# Patient Record
Sex: MALE | Race: WHITE | Employment: OTHER | URBAN - METROPOLITAN AREA
[De-identification: names, ages, dates, MRNs, and addresses within clinical notes are randomized per-mention and may not be internally consistent; named-entity substitution may affect disease eponyms.]

---

## 2017-01-23 DIAGNOSIS — E78.5 HYPERLIPIDEMIA LDL GOAL <100: Primary | ICD-10-CM

## 2017-01-23 DIAGNOSIS — E11.9 TYPE 2 DIABETES MELLITUS TREATED WITHOUT INSULIN (H): ICD-10-CM

## 2017-01-23 NOTE — TELEPHONE ENCOUNTER
Metformin         Last Written Prescription Date: 10/31/16  Last Fill Quantity: 180, # refills: 0  Last Office Visit with [facility], [facility] or [facility] prescribing provider:  08/10/16        BP Readings from Last 3 Encounters:   08/10/16 120/70   05/24/16 105/64   03/03/16 112/56     MICROL       28   8/10/2016  No results found for this basename: microalbumin  CREATININE   Date Value Ref Range Status   08/10/2016 1.40* 0.66 - 1.25 mg/dL Final   ]  GFR ESTIMATE   Date Value Ref Range Status   08/10/2016 52* >60 mL/min/1.7m2 Final     Comment:     Non  GFR Calc   03/03/2016 69 >60 mL/min/1.7m2 Final     Comment:     Non  GFR Calc   03/02/2016 61 >60 mL/min/1.7m2 Final     Comment:     Non  GFR Calc     GFR ESTIMATE IF BLACK   Date Value Ref Range Status   08/10/2016 63 >60 mL/min/1.7m2 Final     Comment:      GFR Calc   03/03/2016 84 >60 mL/min/1.7m2 Final     Comment:      GFR Calc   03/02/2016 74 >60 mL/min/1.7m2 Final     Comment:      GFR Calc     CHOL      128   8/10/2016  HDL       22   8/10/2016  LDL       66   8/10/2016  TRIG      198   8/10/2016  CHOLHDLRATIO      5.5   10/8/2015  AST       23   8/10/2016  ALT       39   8/10/2016  A1C      6.1   8/10/2016  A1C      5.9   10/8/2015  A1C      6.0   3/24/2015  A1C      5.7   9/19/2014  A1C      7.0   3/4/2014  POTASSIUM   Date Value Ref Range Status   08/10/2016 4.4 3.4 - 5.3 mmol/L Final       Labs showing if normal/abnormal  Lab Results   Component Value Date    UCRR 212 08/10/2016    MICROL 28 08/10/2016     Lab Results   Component Value Date    CHOL 128 08/10/2016    TRIG 198* 08/10/2016    HDL 22* 08/10/2016    LDL 66 08/10/2016    VLDL 30 10/08/2015    CHOLHDLRATIO 5.5* 10/08/2015     Lab Results   Component Value Date    A1C 6.1* 08/10/2016    A1C 5.9 10/08/2015    A1C 6.0 03/24/2015     Atorvastatin     Last Written Prescription Date: 10/31/16  Last Fill Quantity:  90, # refills: 0  Last Office Visit with [facility], [facility] or [facility] prescribing provider: 08/10/16       CHOL      128   8/10/2016  HDL       22   8/10/2016  LDL       66   8/10/2016  TRIG      198   8/10/2016  CHOLHDLRATIO      5.5   10/8/2015    Labs showing if normal/abnormal  Lab Results   Component Value Date    CHOL 128 08/10/2016    TRIG 198* 08/10/2016    HDL 22* 08/10/2016    LDL 66 08/10/2016    VLDL 30 10/08/2015    CHOLHDLRATIO 5.5* 10/08/2015

## 2017-01-25 DIAGNOSIS — D73.5 SPLENIC INFARCTION: Primary | ICD-10-CM

## 2017-01-25 RX ORDER — ATORVASTATIN CALCIUM 20 MG/1
20 TABLET, FILM COATED ORAL DAILY
Qty: 90 TABLET | Refills: 0 | Status: SHIPPED | OUTPATIENT
Start: 2017-01-25 | End: 2017-04-22

## 2017-01-25 NOTE — TELEPHONE ENCOUNTER
Atorvastatin prescription approved per [facility] Refill Protocol--due for OV in Feb 2017.      Routing Metformin refill request to provider for review/approval because:  Labs out of range:  Creat    Please advise, thanks.

## 2017-01-25 NOTE — TELEPHONE ENCOUNTER
rebecca         Last Written Prescription Date: 12/21/16  Last Fill Quantity: 20, # refills: 0  Last Office Visit with [facility], [facility] or [facility] prescribing provider:  8/10/16        BP Readings from Last 3 Encounters:   08/10/16 120/70   05/24/16 105/64   03/03/16 112/56     MICROL       28   8/10/2016  No results found for this basename: microalbumin  CREATININE   Date Value Ref Range Status   08/10/2016 1.40* 0.66 - 1.25 mg/dL Final   ]  GFR ESTIMATE   Date Value Ref Range Status   08/10/2016 52* >60 mL/min/1.7m2 Final     Comment:     Non  GFR Calc   03/03/2016 69 >60 mL/min/1.7m2 Final     Comment:     Non  GFR Calc   03/02/2016 61 >60 mL/min/1.7m2 Final     Comment:     Non  GFR Calc     GFR ESTIMATE IF BLACK   Date Value Ref Range Status   08/10/2016 63 >60 mL/min/1.7m2 Final     Comment:      GFR Calc   03/03/2016 84 >60 mL/min/1.7m2 Final     Comment:      GFR Calc   03/02/2016 74 >60 mL/min/1.7m2 Final     Comment:      GFR Calc     CHOL      128   8/10/2016  HDL       22   8/10/2016  LDL       66   8/10/2016  TRIG      198   8/10/2016  CHOLHDLRATIO      5.5   10/8/2015  AST       23   8/10/2016  ALT       39   8/10/2016  A1C      6.1   8/10/2016  A1C      5.9   10/8/2015  A1C      6.0   3/24/2015  A1C      5.7   9/19/2014  A1C      7.0   3/4/2014  POTASSIUM   Date Value Ref Range Status   08/10/2016 4.4 3.4 - 5.3 mmol/L Final     Karin Ness - Pharmacy [facility]  [phone]

## 2017-01-27 RX ORDER — WARFARIN SODIUM 7.5 MG/1
TABLET ORAL
Qty: 20 TABLET | Refills: 0 | Status: SHIPPED | OUTPATIENT
Start: 2017-01-27 | End: 2017-03-01

## 2017-01-27 NOTE — TELEPHONE ENCOUNTER
Medication is being filled for 1 time refill only due to:  Patient needs to be seen because INR due.

## 2017-01-31 DIAGNOSIS — E11.22 TYPE 2 DIABETES MELLITUS WITH STAGE 3 CHRONIC KIDNEY DISEASE, WITHOUT LONG-TERM CURRENT USE OF INSULIN (H): ICD-10-CM

## 2017-01-31 DIAGNOSIS — N52.8 OTHER MALE ERECTILE DYSFUNCTION: Primary | ICD-10-CM

## 2017-01-31 DIAGNOSIS — N18.30 TYPE 2 DIABETES MELLITUS WITH STAGE 3 CHRONIC KIDNEY DISEASE, WITHOUT LONG-TERM CURRENT USE OF INSULIN (H): ICD-10-CM

## 2017-01-31 NOTE — TELEPHONE ENCOUNTER
viagra      Last Written Prescription Date: 7/21/2015  Last Fill Quantity: 6,  # refills: 11   Last Office Visit with [facility], [facility] or [facility] prescribing provider: 8/10/16                                             Pen needles         Last Written Prescription Date: 7/13/16  Last Fill Quantity: 400, # refills: 0  Last Office Visit with [facility], [facility] or [facility] prescribing provider:  8/10/2016        BP Readings from Last 3 Encounters:   08/10/16 120/70   05/24/16 105/64   03/03/16 112/56     MICROL       28   8/10/2016  No results found for this basename: microalbumin  CREATININE   Date Value Ref Range Status   08/10/2016 1.40* 0.66 - 1.25 mg/dL Final   ]  GFR ESTIMATE   Date Value Ref Range Status   08/10/2016 52* >60 mL/min/1.7m2 Final     Comment:     Non  GFR Calc   03/03/2016 69 >60 mL/min/1.7m2 Final     Comment:     Non  GFR Calc   03/02/2016 61 >60 mL/min/1.7m2 Final     Comment:     Non  GFR Calc     GFR ESTIMATE IF BLACK   Date Value Ref Range Status   08/10/2016 63 >60 mL/min/1.7m2 Final     Comment:      GFR Calc   03/03/2016 84 >60 mL/min/1.7m2 Final     Comment:      GFR Calc   03/02/2016 74 >60 mL/min/1.7m2 Final     Comment:      GFR Calc     CHOL      128   8/10/2016  HDL       22   8/10/2016  LDL       66   8/10/2016  TRIG      198   8/10/2016  CHOLHDLRATIO      5.5   10/8/2015  AST       23   8/10/2016  ALT       39   8/10/2016  A1C      6.1   8/10/2016  A1C      5.9   10/8/2015  A1C      6.0   3/24/2015  A1C      5.7   9/19/2014  A1C      7.0   3/4/2014  POTASSIUM   Date Value Ref Range Status   08/10/2016 4.4 3.4 - 5.3 mmol/L Final     Karin Ness - Pharmacy [facility]  [phone]

## 2017-02-01 RX ORDER — SILDENAFIL 100 MG/1
50-100 TABLET, FILM COATED ORAL DAILY PRN
Qty: 6 TABLET | Refills: 2 | Status: SHIPPED | OUTPATIENT
Start: 2017-02-01

## 2017-02-24 ENCOUNTER — TELEPHONE (OUTPATIENT)
Dept: INTERNAL MEDICINE | Facility: CLINIC | Age: 62
End: 2017-02-24

## 2017-02-24 DIAGNOSIS — N18.30 TYPE 2 DIABETES MELLITUS WITH STAGE 3 CHRONIC KIDNEY DISEASE, WITHOUT LONG-TERM CURRENT USE OF INSULIN (H): Primary | ICD-10-CM

## 2017-02-24 DIAGNOSIS — E11.22 TYPE 2 DIABETES MELLITUS WITH STAGE 3 CHRONIC KIDNEY DISEASE, WITHOUT LONG-TERM CURRENT USE OF INSULIN (H): Primary | ICD-10-CM

## 2017-02-24 DIAGNOSIS — E78.5 HYPERLIPIDEMIA WITH TARGET LDL LESS THAN 100: ICD-10-CM

## 2017-02-24 NOTE — TELEPHONE ENCOUNTER
Left message for patient advising him to schedule a visit with the INR Clinic since he is overdue for a visit.  Kaitlin Lassiter RN

## 2017-02-25 NOTE — TELEPHONE ENCOUNTER
Patient needs to be seen for a medication recheck before the end of March. He is hoping Dr. Delgado can work him in because  Is booked until April 19th as of today.    Alka SANDOVAL  Central Scheduler

## 2017-02-27 NOTE — TELEPHONE ENCOUNTER
Last appt was 8/10/16.   Pt due for diabetes follow up, next available appt is 4/19. Pt requesting sooner appt, please advise if pt can be seen on a hold appt time.

## 2017-02-28 ENCOUNTER — TELEPHONE (OUTPATIENT)
Dept: INTERNAL MEDICINE | Facility: CLINIC | Age: 62
End: 2017-02-28

## 2017-02-28 NOTE — TELEPHONE ENCOUNTER
Panel Management Review      Patient has the following on his problem list:     Diabetes    ASA: Failed    Last A1C  Lab Results   Component Value Date    A1C 6.1 08/10/2016    A1C 5.9 10/08/2015    A1C 6.0 03/24/2015    A1C 5.7 09/19/2014    A1C 7.0 03/04/2014     A1C tested: Passed    Last LDL:    Lab Results   Component Value Date    CHOL 128 08/10/2016     Lab Results   Component Value Date    HDL 22 08/10/2016     Lab Results   Component Value Date    LDL 66 08/10/2016     Lab Results   Component Value Date    TRIG 198 08/10/2016     Lab Results   Component Value Date    CHOLHDLRATIO 5.5 10/08/2015     Lab Results   Component Value Date    NHDL 106 08/10/2016       Is the patient on a Statin? YES             Is the patient on Aspirin? NO    Medications     HMG CoA Reductase Inhibitors    atorvastatin (LIPITOR) 20 MG tablet          Last three blood pressure readings:  BP Readings from Last 3 Encounters:   08/10/16 120/70   05/24/16 105/64   03/03/16 112/56       Date of last diabetes office visit: 08/10/2016     Tobacco History:     History   Smoking Status     Current Every Day Smoker     Packs/day: 0.50   Smokeless Tobacco     Never Used           IVD   ASA: FAILED    Last LDL:    Lab Results   Component Value Date    CHOL 128 08/10/2016     Lab Results   Component Value Date    HDL 22 08/10/2016     Lab Results   Component Value Date    LDL 66 08/10/2016     Lab Results   Component Value Date    TRIG 198 08/10/2016        Lab Results   Component Value Date    CHOLHDLRATIO 5.5 10/08/2015        Is the patient on a Statin? YES   Is the patient on Aspirin? NO                  Medications     HMG CoA Reductase Inhibitors    atorvastatin (LIPITOR) 20 MG tablet          Last three blood pressure readings:  BP Readings from Last 3 Encounters:   08/10/16 120/70   05/24/16 105/64   03/03/16 112/56        Tobacco History:     History   Smoking Status     Current Every Day Smoker     Packs/day: 0.50   Smokeless  Tobacco     Never Used       Hypertension   Last three blood pressure readings:  BP Readings from Last 3 Encounters:   08/10/16 120/70   05/24/16 105/64   03/03/16 112/56     Blood pressure: Passed    HTN Guidelines:  Age 18-59 BP range:  Less than 140/90  Age 60-85 with Diabetes:  Less than 140/90  Age 60-85 without Diabetes:  less than 150/90      Composite cancer screening  Chart review shows that this patient is due/due soon for the following Colonoscopy  Summary:    Patient is due/failing the following:   COLONOSCOPY    Action needed:   Patient needs referral/order: colonoscopy    Type of outreach:    None, routed to provider for review.    Questions for provider review:    Please place order for colonoscopy. Thank you.                                                                                                                                    Cristal TEAGUE       Chart routed to Provider .

## 2017-03-01 ENCOUNTER — TELEPHONE (OUTPATIENT)
Dept: INTERNAL MEDICINE | Facility: CLINIC | Age: 62
End: 2017-03-01

## 2017-03-01 ENCOUNTER — ANTICOAGULATION THERAPY VISIT (OUTPATIENT)
Dept: ANTICOAGULATION | Facility: CLINIC | Age: 62
End: 2017-03-01
Payer: COMMERCIAL

## 2017-03-01 DIAGNOSIS — D68.51 FACTOR 5 LEIDEN MUTATION, HETEROZYGOUS (H): Primary | ICD-10-CM

## 2017-03-01 DIAGNOSIS — D73.5 SPLENIC INFARCTION: ICD-10-CM

## 2017-03-01 DIAGNOSIS — Z79.01 LONG-TERM (CURRENT) USE OF ANTICOAGULANTS: ICD-10-CM

## 2017-03-01 DIAGNOSIS — D68.51 FACTOR 5 LEIDEN MUTATION, HETEROZYGOUS (H): ICD-10-CM

## 2017-03-01 LAB — INR POINT OF CARE: 2.7 (ref 0.86–1.14)

## 2017-03-01 PROCEDURE — 36416 COLLJ CAPILLARY BLOOD SPEC: CPT

## 2017-03-01 PROCEDURE — 99207 ZZC NO CHARGE NURSE ONLY: CPT

## 2017-03-01 PROCEDURE — 85610 PROTHROMBIN TIME: CPT | Mod: QW

## 2017-03-01 RX ORDER — WARFARIN SODIUM 7.5 MG/1
TABLET ORAL
Qty: 20 TABLET | Refills: 0 | Status: SHIPPED | OUTPATIENT
Start: 2017-03-01 | End: 2017-03-27

## 2017-03-01 NOTE — MR AVS SNAPSHOT
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]  Factor 5 Leiden mutation  heterozygous (H) [D68.51]         Contact Numbers     [facility] Phone Numbers:  Anticoagulation Clinic Appointments : [phone]  Anticoagulation Nurse: [phone]         March 2017 Details    Sun Mon Tue Wed Thu Fri Sat        1      7.5 mg   See details      2      3.75 mg         3      7.5 mg         4      3.75 mg           5      3.75 mg         6      7.5 mg         7      3.75 mg         8      7.5 mg         9      3.75 mg         10      7.5 mg         11      3.75 mg           12      3.75 mg         13      7.5 mg         14      3.75 mg         15      7.5 mg         16      3.75 mg         17      7.5 mg         18      3.75 mg           19      3.75 mg         20      7.5 mg         21      3.75 mg         22      7.5 mg         23      3.75 mg         24      7.5 mg         25      3.75 mg           26      3.75 mg         27      7.5 mg         28      3.75 mg         29      7.5 mg         30      3.75 mg         31      7.5 mg           Date Details   03/01 This INR check               How to take your warfarin dose     To take:  3.75 mg Take 0.5 of a 7.5 mg tablet.    To take:  7.5 mg Take 1 of the 7.5 mg tablets.           April 2017 Details    Sun Mon Tue Wed Thu Fri Sat           1      3.75 mg           2      3.75 mg         3      7.5 mg         4      3.75 mg         5      7.5 mg         6      3.75 mg         7      7.5 mg         8       3.75 mg           9      3.75 mg         10      7.5 mg         11      3.75 mg         12            13               14               15                 16               17               18               19               20               21               22                 23               24               25               26               27               28               29                 30                      Date Details   No additional details    Date of next INR:  4/12/2017         How to take your warfarin dose     To take:  3.75 mg Take 0.5 of a 7.5 mg tablet.    To take:  7.5 mg Take 1 of the 7.5 mg tablets.

## 2017-03-01 NOTE — TELEPHONE ENCOUNTER
For insurance purposes, an INR referral is needed.  Please sign order and route message back to [facility].    Reminded pt that he is due for his annual visit today.  Kaitlin Lassiter RN

## 2017-03-01 NOTE — PROGRESS NOTES
ANTICOAGULATION FOLLOW-UP CLINIC VISIT    Patient Name:  Osvaldo Parry  Date:  3/1/2017  Contact Type:  Face to Face    SUBJECTIVE:     Patient Findings     Positives No Problem Findings           OBJECTIVE    INR Protime   Date Value Ref Range Status   03/01/2017 2.7 (A) 0.86 - 1.14 Final       ASSESSMENT / PLAN  INR assessment THER    Recheck INR In: 6 WEEKS    INR Location Clinic      Anticoagulation Summary as of 3/1/2017     INR goal 2.0-3.0   Today's INR 2.7   Maintenance plan 7.5 mg (7.5 mg x 1) on Mon, Wed, Fri; 3.75 mg (7.5 mg x 0.5) all other days   Full instructions 7.5 mg on Mon, Wed, Fri; 3.75 mg all other days   Weekly total 37.5 mg   No change documented Kaitlin Lassiter RN   Plan last modified Kaitlin Lassiter RN (8/10/2016)   Next INR check 4/12/2017   Target end date     Indications   Long-term (current) use of anticoagulants [Z79.01] [Z79.01]  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]  Factor 5 Leiden mutation  heterozygous (H) [D68.51]         Anticoagulation Episode Summary     INR check location     Preferred lab     Send INR reminders to [facility]    Comments       Anticoagulation Care Providers     Provider Role Specialty Phone number    Ugo Delgado MD [facility] Internal Medicine [phone]            See the Encounter Report to view Anticoagulation Flowsheet and Dosing Calendar (Go to Encounters tab in chart review, and find the Anticoagulation Therapy Visit)    Dosage adjustment made based on physician directed care plan.    Kaitlin Lassiter RN

## 2017-03-03 DIAGNOSIS — E11.22 TYPE 2 DIABETES MELLITUS WITH STAGE 3 CHRONIC KIDNEY DISEASE, WITHOUT LONG-TERM CURRENT USE OF INSULIN (H): ICD-10-CM

## 2017-03-03 DIAGNOSIS — N18.30 TYPE 2 DIABETES MELLITUS WITH STAGE 3 CHRONIC KIDNEY DISEASE, WITHOUT LONG-TERM CURRENT USE OF INSULIN (H): ICD-10-CM

## 2017-03-03 NOTE — TELEPHONE ENCOUNTER
Monica         Last Written Prescription Date: 12/5/16  Last Fill Quantity: 9 mL, # refills: 2  Last Office Visit with [facility], [facility] or  [facility] prescribing provider:  8/10/16   Next 5 appointments (look out 90 days)     Apr 21, 2017  4:20 PM CDT   [facility]gado [facility]    [address]  [facility] [id number] [phone]                   BP Readings from Last 3 Encounters:   08/10/16 120/70   05/24/16 105/64   03/03/16 112/56     Lab Results   Component Value Date    MICROL 28 08/10/2016     No results found for: MICROALBUMIN  Creatinine   Date Value Ref Range Status   08/10/2016 1.40 (H) 0.66 - 1.25 mg/dL Final   ]  GFR Estimate   Date Value Ref Range Status   08/10/2016 52 (L) >60 mL/min/1.7m2 Final     Comment:     Non  GFR Calc   03/03/2016 69 >60 mL/min/1.7m2 Final     Comment:     Non  GFR Calc   03/02/2016 61 >60 mL/min/1.7m2 Final     Comment:     Non  GFR Calc     GFR Estimate If Black   Date Value Ref Range Status   08/10/2016 63 >60 mL/min/1.7m2 Final     Comment:      GFR Calc   03/03/2016 84 >60 mL/min/1.7m2 Final     Comment:      GFR Calc   03/02/2016 74 >60 mL/min/1.7m2 Final     Comment:      GFR Calc     Lab Results   Component Value Date    CHOL 128 08/10/2016     Lab Results   Component Value Date    HDL 22 08/10/2016     Lab Results   Component Value Date    LDL 66 08/10/2016     Lab Results   Component Value Date    TRIG 198 08/10/2016     Lab Results   Component Value Date    CHOLHDLRATIO 5.5 10/08/2015     Lab Results   Component Value Date    AST 23 08/10/2016     Lab Results   Component Value Date    ALT 39 08/10/2016     Lab Results   Component Value Date    A1C 6.1 08/10/2016    A1C 5.9 10/08/2015    A1C 6.0 03/24/2015    A1C 5.7 09/19/2014    A1C 7.0 03/04/2014     Potassium   Date Value Ref Range Status    08/10/2016 4.4 3.4 - 5.3 mmol/L Final       Labs showing if normal/abnormal  Lab Results   Component Value Date    UCRR 212 08/10/2016    MICROL 28 08/10/2016     Lab Results   Component Value Date    CHOL 128 08/10/2016    TRIG 198 (H) 08/10/2016    HDL 22 (L) 08/10/2016    LDL 66 08/10/2016    VLDL 30 10/08/2015    CHOLHDLRATIO 5.5 (H) 10/08/2015     Lab Results   Component Value Date    A1C 6.1 (H) 08/10/2016    A1C 5.9 10/08/2015    A1C 6.0 03/24/2015

## 2017-03-06 RX ORDER — LIRAGLUTIDE 6 MG/ML
1.2 INJECTION SUBCUTANEOUS DAILY
Qty: 18 ML | Refills: 0 | Status: SHIPPED | OUTPATIENT
Start: 2017-03-06 | End: 2017-05-22

## 2017-03-06 NOTE — TELEPHONE ENCOUNTER
Medication is being filled for 1 time refill only due to:  refilled through upcoming office visit.

## 2017-03-07 NOTE — TELEPHONE ENCOUNTER
"Please call Mr Parry and work him in for an appointment sometime in the next 1-2 weeks.   Please have him schedule a future fasting \"lab only\" appointment to be done prior to the office appt.   "

## 2017-03-07 NOTE — TELEPHONE ENCOUNTER
Appt scheduled for labs on 3/14 and office visit 3/17.   Copy of appts mailed to pt per his request.

## 2017-03-27 DIAGNOSIS — D73.5 SPLENIC INFARCTION: ICD-10-CM

## 2017-03-27 RX ORDER — WARFARIN SODIUM 7.5 MG/1
TABLET ORAL
Qty: 100 TABLET | Refills: 1 | Status: SHIPPED | OUTPATIENT
Start: 2017-03-27 | End: 2018-01-15

## 2017-04-22 DIAGNOSIS — E78.5 HYPERLIPIDEMIA LDL GOAL <100: ICD-10-CM

## 2017-04-22 DIAGNOSIS — E11.9 TYPE 2 DIABETES MELLITUS TREATED WITHOUT INSULIN (H): ICD-10-CM

## 2017-04-22 DIAGNOSIS — N18.30 TYPE 2 DIABETES MELLITUS WITH STAGE 3 CHRONIC KIDNEY DISEASE (H): ICD-10-CM

## 2017-04-22 DIAGNOSIS — E11.22 TYPE 2 DIABETES MELLITUS WITH STAGE 3 CHRONIC KIDNEY DISEASE (H): ICD-10-CM

## 2017-04-24 RX ORDER — BLOOD SUGAR DIAGNOSTIC
STRIP MISCELLANEOUS
Qty: 100 EACH | Refills: 0 | Status: SHIPPED | OUTPATIENT
Start: 2017-04-24 | End: 2017-08-13

## 2017-04-24 RX ORDER — ATORVASTATIN CALCIUM 20 MG/1
20 TABLET, FILM COATED ORAL DAILY
Qty: 30 TABLET | Refills: 0 | Status: SHIPPED | OUTPATIENT
Start: 2017-04-24 | End: 2017-05-22

## 2017-05-17 ENCOUNTER — ANTICOAGULATION THERAPY VISIT (OUTPATIENT)
Dept: ANTICOAGULATION | Facility: CLINIC | Age: 62
End: 2017-05-17
Payer: COMMERCIAL

## 2017-05-17 DIAGNOSIS — D68.51 FACTOR 5 LEIDEN MUTATION, HETEROZYGOUS (H): ICD-10-CM

## 2017-05-17 DIAGNOSIS — Z79.01 LONG-TERM (CURRENT) USE OF ANTICOAGULANTS: ICD-10-CM

## 2017-05-17 LAB — INR POINT OF CARE: 2.7 (ref 0.86–1.14)

## 2017-05-17 PROCEDURE — 99207 ZZC NO CHARGE NURSE ONLY: CPT

## 2017-05-17 PROCEDURE — 36416 COLLJ CAPILLARY BLOOD SPEC: CPT

## 2017-05-17 PROCEDURE — 85610 PROTHROMBIN TIME: CPT | Mod: QW

## 2017-05-17 NOTE — PROGRESS NOTES
ANTICOAGULATION FOLLOW-UP CLINIC VISIT    Patient Name:  Osvaldo Parry  Date:  5/17/2017  Contact Type:  Face to Face    SUBJECTIVE:     Patient Findings     Positives No Problem Findings           OBJECTIVE    INR Protime   Date Value Ref Range Status   05/17/2017 2.7 (A) 0.86 - 1.14 Final       ASSESSMENT / PLAN  INR assessment THER    Recheck INR In: 6 WEEKS    INR Location Clinic      Anticoagulation Summary as of 5/17/2017     INR goal 2.0-3.0   Today's INR 2.7   Maintenance plan 7.5 mg (7.5 mg x 1) on Mon, Wed, Fri; 3.75 mg (7.5 mg x 0.5) all other days   Full instructions 7.5 mg on Mon, Wed, Fri; 3.75 mg all other days   Weekly total 37.5 mg   No change documented Kaitlin Lassiter RN   Plan last modified Kaitlin Lassiter RN (8/10/2016)   Next INR check    Target end date     Indications   Long-term (current) use of anticoagulants [Z79.01] [Z79.01]  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    Kaitlin Lassiter RN

## 2017-05-17 NOTE — MR AVS SNAPSHOT
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]  Factor 5 Leiden mutation  heterozygous (H) [D68.51]         Your next Anticoagulation Clinic appointment(s)     May 17, 2017  1:15 PM CDT   [facility] with [facility]   [facility]    [address] [facility] [address]  [facility] [id number] [phone]            Jun 28, 2017  1:15 PM CDT   [facility]ticoagulation Visit with [facility]   [facility]    [address] [facility] [address]  [facility] [address] [phone]              Contact Numbers     [facility] Phone Numbers:  Anticoagulation Clinic Appointments : [phone]  Anticoagulation Nurse: [phone]         May 2017 Details    Sun Mon Tue Wed Thu Fri Sat      1               2               3               4               5               6                 7               8               9               10               11               12               13                 14               15               16               17      7.5 mg   See details      18      3.75 mg         19      7.5 mg         20      3.75 mg           21      3.75 mg         22      7.5 mg         23      3.75 mg         24      7.5 mg         25      3.75 mg         26      7.5 mg         27      3.75 mg           28      3.75 mg         29      7.5 mg         30      3.75 mg         31      7.5 mg             Date Details   05/17  This INR check               How to take your warfarin dose     To take:  3.75 mg Take 0.5 of a 7.5 mg tablet.    To take:  7.5 mg Take 1 of the 7.5 mg tablets.           June 2017 Details    Sun Mon Tue Wed Thu Fri Sat         1      3.75 mg         2      7.5 mg         3      3.75 mg           4      3.75 mg         5      7.5 mg         6      3.75 mg         7      7.5 mg         8      3.75 mg         9      7.5 mg         10      3.75 mg           11      3.75 mg         12      7.5 mg         13      3.75 mg         14      7.5 mg         15      3.75 mg         16      7.5 mg         17      3.75 mg           18      3.75 mg         19      7.5 mg         20      3.75 mg         21      7.5 mg         22      3.75 mg         23      7.5 mg         24      3.75 mg           25      3.75 mg         26      7.5 mg         27      3.75 mg         28            29               30                 Date Details   No additional details    Date of next INR:  6/28/2017         How to take your warfarin dose     To take:  3.75 mg Take 0.5 of a 7.5 mg tablet.    To take:  7.5 mg Take 1 of the 7.5 mg tablets.

## 2017-05-22 DIAGNOSIS — E78.5 HYPERLIPIDEMIA LDL GOAL <100: ICD-10-CM

## 2017-05-22 DIAGNOSIS — E11.22 TYPE 2 DIABETES MELLITUS WITH STAGE 3 CHRONIC KIDNEY DISEASE, WITHOUT LONG-TERM CURRENT USE OF INSULIN (H): ICD-10-CM

## 2017-05-22 DIAGNOSIS — N18.30 TYPE 2 DIABETES MELLITUS WITH STAGE 3 CHRONIC KIDNEY DISEASE, WITHOUT LONG-TERM CURRENT USE OF INSULIN (H): ICD-10-CM

## 2017-05-23 RX ORDER — LIRAGLUTIDE 6 MG/ML
INJECTION SUBCUTANEOUS
Qty: 18 ML | Refills: 0 | Status: SHIPPED | OUTPATIENT
Start: 2017-05-23 | End: 2017-05-24

## 2017-05-23 RX ORDER — PEN NEEDLE, DIABETIC 31 GX5/16"
NEEDLE, DISPOSABLE MISCELLANEOUS
Qty: 100 EACH | Refills: 0 | Status: SHIPPED | OUTPATIENT
Start: 2017-05-23 | End: 2017-05-24

## 2017-05-23 RX ORDER — ATORVASTATIN CALCIUM 20 MG/1
TABLET, FILM COATED ORAL
Qty: 30 TABLET | Refills: 0 | Status: SHIPPED | OUTPATIENT
Start: 2017-05-23 | End: 2017-05-24

## 2017-05-23 NOTE — TELEPHONE ENCOUNTER
Atorvastatin     Last Written Prescription Date: 04/24/17  Last Fill Quantity: 30, # refills: 0  Last Office Visit with [facility] or [facility] provider: 08/10/16  Next 5 appointments (look out 90 days)     Mat 15, 2017 12:15 PM CDT   Return Visit with Joseph Nicolas MD   [facility] [place] ([facility])    [address]  [place] [address]   [phone]            Jul 21, 2017 10:20 AM CDT   SHORT with Ugo Delgado MD   [facility] ([facility])    [address]  [facility] [address] [phone]                   Lab Results   Component Value Date    CHOL 128 08/10/2016     Lab Results   Component Value Date    HDL 22 08/10/2016     Lab Results   Component Value Date    LDL 66 08/10/2016     Lab Results   Component Value Date    TRIG 198 08/10/2016     Lab Results   Component Value Date    CHOLHDLRATIO 5.5 10/08/2015       Labs showing if normal/abnormal  Lab Results   Component Value Date    CHOL 128 08/10/2016    TRIG 198 (H) 08/10/2016    HDL 22 (L) 08/10/2016    LDL 66 08/10/2016    VLDL 30 10/08/2015    CHOLHDLRATIO 5.5 (H) 10/08/2015     Victoza pens    AND Pen needles  31x8   Last Written Prescription Date: 03/06/17  AND 2/1/17  Last Fill Quantity: 18mL  , # refills: 0  AND 2  Last Office Visit with [facility] or [facility] provider:  08/10/16   Next 5 appointments (look out 90 days)     Mat 15, 2017 12:15 PM CDT   Return Visit with Joseph Nicolas MD   [facility] [place] ([facility])    [address]  [place] [address] [phone]            Jul 21, 2017 10:20 AM CDT   SHORT with Ugo Delgado MD   [facility] ([facility])    [address]  [facility] [address] [phone]                   BP Readings from Last 3 Encounters:   08/10/16 120/70   05/24/16  105/64   03/03/16 112/56     Lab Results   Component Value Date    MICROL 28 08/10/2016     Lab Results   Component Value Date    UMALCR 12.97 08/10/2016     Creatinine   Date Value Ref Range Status   08/10/2016 1.40 (H) 0.66 - 1.25 mg/dL Final   ]  GFR Estimate   Date Value Ref Range Status   08/10/2016 52 (L) >60 mL/min/1.7m2 Final     Comment:     Non  GFR Calc   03/03/2016 69 >60 mL/min/1.7m2 Final     Comment:     Non  GFR Calc   03/02/2016 61 >60 mL/min/1.7m2 Final     Comment:     Non  GFR Calc     GFR Estimate If Black   Date Value Ref Range Status   08/10/2016 63 >60 mL/min/1.7m2 Final     Comment:      GFR Calc   03/03/2016 84 >60 mL/min/1.7m2 Final     Comment:      GFR Calc   03/02/2016 74 >60 mL/min/1.7m2 Final     Comment:      GFR Calc     Lab Results   Component Value Date    CHOL 128 08/10/2016     Lab Results   Component Value Date    HDL 22 08/10/2016     Lab Results   Component Value Date    LDL 66 08/10/2016     Lab Results   Component Value Date    TRIG 198 08/10/2016     Lab Results   Component Value Date    CHOLHDLRATIO 5.5 10/08/2015     Lab Results   Component Value Date    AST 23 08/10/2016     Lab Results   Component Value Date    ALT 39 08/10/2016     Lab Results   Component Value Date    A1C 6.1 08/10/2016    A1C 5.9 10/08/2015    A1C 6.0 03/24/2015    A1C 5.7 09/19/2014    A1C 7.0 03/04/2014     Potassium   Date Value Ref Range Status   08/10/2016 4.4 3.4 - 5.3 mmol/L Final       Labs showing if normal/abnormal  Lab Results   Component Value Date    UCRR 212 08/10/2016    MICROL 28 08/10/2016     Lab Results   Component Value Date    CHOL 128 08/10/2016    TRIG 198 (H) 08/10/2016    HDL 22 (L) 08/10/2016    LDL 66 08/10/2016    VLDL 30 10/08/2015    CHOLHDLRATIO 5.5 (H) 10/08/2015     Lab Results   Component Value Date    A1C 6.1 (H) 08/10/2016    A1C 5.9 10/08/2015    A1C 6.0 03/24/2015

## 2017-05-24 RX ORDER — LIRAGLUTIDE 6 MG/ML
INJECTION SUBCUTANEOUS
Qty: 18 ML | Refills: 0 | Status: SHIPPED | OUTPATIENT
Start: 2017-05-24 | End: 2017-08-13

## 2017-05-24 RX ORDER — ATORVASTATIN CALCIUM 20 MG/1
20 TABLET, FILM COATED ORAL DAILY
Qty: 30 TABLET | Refills: 0 | Status: SHIPPED | OUTPATIENT
Start: 2017-05-24 | End: 2017-06-19

## 2017-06-14 ENCOUNTER — TELEPHONE (OUTPATIENT)
Dept: INTERNAL MEDICINE | Facility: CLINIC | Age: 62
End: 2017-06-14

## 2017-06-14 NOTE — TELEPHONE ENCOUNTER
"Patient stating he is \"running into some problems\", yesterday he vomited all day. Patient states today he is feeling fine and hasn't vomited all day. Patient states he thawed out some chicken starting at 1700 and left it in water in the sink and patient states he woke up at 0200 and then grilled and ate the chicken. Patient then started vomiting all day starting in the morning. Informed patient that he may have had food poisoning. Patient asking to see provider, offered appointment with some providers and patient declines at this time. Patient will continue to monitor and if he vomits again like yesterday he will call back and see another provider.  "

## 2017-06-19 DIAGNOSIS — E11.22 TYPE 2 DIABETES MELLITUS WITH STAGE 3 CHRONIC KIDNEY DISEASE, WITHOUT LONG-TERM CURRENT USE OF INSULIN (H): ICD-10-CM

## 2017-06-19 DIAGNOSIS — N18.30 TYPE 2 DIABETES MELLITUS WITH STAGE 3 CHRONIC KIDNEY DISEASE, WITHOUT LONG-TERM CURRENT USE OF INSULIN (H): ICD-10-CM

## 2017-06-19 DIAGNOSIS — E78.5 HYPERLIPIDEMIA LDL GOAL <100: ICD-10-CM

## 2017-06-20 RX ORDER — ATORVASTATIN CALCIUM 20 MG/1
TABLET, FILM COATED ORAL
Qty: 30 TABLET | Refills: 1 | Status: SHIPPED | OUTPATIENT
Start: 2017-06-20 | End: 2017-08-13

## 2017-06-20 RX ORDER — PEN NEEDLE, DIABETIC 31 GX5/16"
NEEDLE, DISPOSABLE MISCELLANEOUS
Qty: 100 EACH | Refills: 0 | Status: SHIPPED | OUTPATIENT
Start: 2017-06-20 | End: 2017-08-13

## 2017-06-20 NOTE — TELEPHONE ENCOUNTER
Atorvastatin     Last Written Prescription Date: 05/24/17  Last Fill Quantity: 30, # refills: 0  Last Office Visit with [facility], [facility] or [facility] prescribing provider: 08/10/16  Next 5 appointments (look out 90 days)     Jul 10, 2017  3:15 PM CDT   Return Visit with Joseph Nicolas MD   [facility] ([facility])    [address]  [facility] [address]   [phone]            Jul 21, 2017 10:20 AM CDT   SHORT with Ugo Delgado MD   [facility] ([facility]    [address]  [facility] [id number] [phone]                   Lab Results   Component Value Date    CHOL 128 08/10/2016     Lab Results   Component Value Date    HDL 22 08/10/2016     Lab Results   Component Value Date    LDL 66 08/10/2016     Lab Results   Component Value Date    TRIG 198 08/10/2016     Lab Results   Component Value Date    CHOLHDLRATIO 5.5 10/08/2015       Labs showing if normal/abnormal  Lab Results   Component Value Date    CHOL 128 08/10/2016    TRIG 198 (H) 08/10/2016    HDL 22 (L) 08/10/2016    LDL 66 08/10/2016    VLDL 30 10/08/2015    CHOLHDLRATIO 5.5 (H) 10/08/2015     Pen Needles 31x8      Last Written Prescription Date: 05/24/17  Last Fill Quantity: 100,  # refills: 0   Last Office Visit with [facility], [facility] or [facility] prescribing provider: 08/10/16                                         Next 5 appointments (look out 90 days)     Jul 10, 2017  3:15 PM CDT   Return Visit with Joseph Nicolas MD   [facility] [place] ([facility])    [address]  [facility] [address]   [phone]            Jul 21, 2017 10:20 AM CDT   SHORT with Ugo Delgado MD   [facility] ([facility])    [address]  [facility] [id number] [phone]

## 2017-07-07 ENCOUNTER — TELEPHONE (OUTPATIENT)
Dept: CARDIOLOGY | Facility: CLINIC | Age: 62
End: 2017-07-07

## 2017-07-07 ENCOUNTER — HOSPITAL ENCOUNTER (OUTPATIENT)
Dept: CARDIOLOGY | Facility: CLINIC | Age: 62
Discharge: HOME | End: 2017-07-07
Attending: INTERNAL MEDICINE | Admitting: INTERNAL MEDICINE
Payer: COMMERCIAL

## 2017-07-07 DIAGNOSIS — I71.20 THORACIC AORTIC ANEURYSM WITHOUT RUPTURE (H): ICD-10-CM

## 2017-07-07 PROCEDURE — 93306 TTE W/DOPPLER COMPLETE: CPT

## 2017-07-07 PROCEDURE — 93306 TTE W/DOPPLER COMPLETE: CPT | Mod: 26 | Performed by: INTERNAL MEDICINE

## 2017-07-07 NOTE — TELEPHONE ENCOUNTER
----- Message from Elsa Tay sent at 7/7/2017  2:42 PM CDT -----  Regarding: Needs Prior Auth for Niacin  Pt of Dr. Nicolas.  Insurance has changed from [employer] to [employer] and now back to [facility].  Last time on [employer] he had to try a different med than Niacin and then Dr. Nicolas wrote a prior authorization  and they then paid for his Niacin.  Same deal now. [facility] is refusing to pay for the Niacin even though he has tried alternates and they all make him itch.    So, if you would write another.  Actually the letter says to contact Customer Service with the number on the back of the card.  That number is [phone].  There is further information on the back of his insurance card just pull it up in Media.

## 2017-07-07 NOTE — TELEPHONE ENCOUNTER
Phone call from patient stating that he now has [employer] insurance ( switched from [employer]) and they are requesting a PA for Niaspan 500 MG QHS. When he was with [facility] a few years ago a PA was submitted to them and approved. The reason for him being on Niaspan is that other preparations of niacin give him pruritis. He has been on Niaspan since 2014 for low HDL and elevated triglycerides. I told him that we will have the [facility] PA team submit a PA real soon. Jani

## 2017-07-10 ENCOUNTER — OFFICE VISIT (OUTPATIENT)
Dept: CARDIOLOGY | Facility: CLINIC | Age: 62
End: 2017-07-10
Attending: INTERNAL MEDICINE
Payer: COMMERCIAL

## 2017-07-10 VITALS
HEIGHT: 74 IN | SYSTOLIC BLOOD PRESSURE: 102 MMHG | DIASTOLIC BLOOD PRESSURE: 62 MMHG | WEIGHT: 221.2 LBS | BODY MASS INDEX: 28.39 KG/M2 | HEART RATE: 68 BPM

## 2017-07-10 DIAGNOSIS — I25.10 CORONARY ARTERY DISEASE INVOLVING NATIVE CORONARY ARTERY WITHOUT ANGINA PECTORIS: Primary | ICD-10-CM

## 2017-07-10 DIAGNOSIS — N18.30 TYPE 2 DIABETES MELLITUS WITH STAGE 3 CHRONIC KIDNEY DISEASE (H): ICD-10-CM

## 2017-07-10 DIAGNOSIS — E11.22 TYPE 2 DIABETES MELLITUS WITH STAGE 3 CHRONIC KIDNEY DISEASE (H): ICD-10-CM

## 2017-07-10 DIAGNOSIS — I71.20 THORACIC AORTIC ANEURYSM WITHOUT RUPTURE (H): ICD-10-CM

## 2017-07-10 DIAGNOSIS — E78.5 HYPERLIPIDEMIA WITH TARGET LDL LESS THAN 100: ICD-10-CM

## 2017-07-10 PROCEDURE — 99214 OFFICE O/P EST MOD 30 MIN: CPT | Performed by: INTERNAL MEDICINE

## 2017-07-10 RX ORDER — NIACIN 500 MG/1
500 TABLET, EXTENDED RELEASE ORAL AT BEDTIME
Qty: 90 TABLET | Refills: 3 | Status: SHIPPED | OUTPATIENT
Start: 2017-07-10 | End: 2018-07-20

## 2017-07-10 NOTE — MR AVS SNAPSHOT
After Visit Summary   7/10/2017    Osvaldo Parry    MRN: [id number]           Patient Information     Date Of Birth          1955        Visit Information        Provider Department      7/10/2017 3:15 PM Joseph Nicolas MD [facility] [place]        Today's Diagnoses     Thoracic aortic aneurysm without rupture (H)           Follow-ups after your visit        Additional Services     Follow-Up with Cardiologist                 Your next 10 appointments already scheduled     Jul 14, 2017 10:00 AM CDT   LAB with [facility] LAB   [facility]    [address]  [facility] [address]   [phone]           Patient must bring picture ID.  Patient should be prepared to give a urine specimen  Please do not eat 10-12 hours before your appointment if you are coming in fasting for labs on lipids, cholesterol, or glucose (sugar).  Pregnant women should follow their Care Team instructions. Water with medications is okay. Do not drink coffee or other fluids.   If you have concerns about taking  your medications, please ask at office or if scheduling via Note, send a message by clicking on Secure Messaging, Message Your Care Team.            Jul 21, 2017 10:20 AM CDT   SHORT with Ugo Delgado MD   [facility]    [address]  [facility] [address] [phone]              Future tests that were ordered for you today     Open Future Orders        Priority Expected Expires Ordered    Follow-Up with Cardiologist Routine 7/10/2018 11/22/2018 7/10/2017    Echocardiogram Routine 7/10/2019 7/30/2019 7/10/2017            Who to contact     If you have questions or need follow up information about today's clinic visit or your schedule please contact [facility] [place] directly at [phone].  Normal or non-critical lab and imaging  "results will be communicated to you by MyChart, letter or phone within 4 business days after the clinic has received the results. If you do not hear from us within 7 days, please contact the clinic through Backchannelmediahart or phone. If you have a critical or abnormal lab result, we will notify you by phone as soon as possible.  Submit refill requests through MailInBlack or call your pharmacy and they will forward the refill request to us. Please allow 3 business days for your refill to be completed.          Additional Information About Your Visit        Backchannelmedia[facility]SalonBookr Information     MailInBlack lets you send messages to your doctor, view your test results, renew your prescriptions, schedule appointments and more. To sign up, go to [email or web address] . Click on \"Log in\" on the left side of the screen, which will take you to the Welcome page. Then click on \"Sign up Now\" on the right side of the page.     You will be asked to enter the access code listed below, as well as some personal information. Please follow the directions to create your username and password.     Your access code is: [email or web address]  Expires: 10/8/2017  3:37 PM     Your access code will  in 90 days. If you need help or a new code, please call your [facility] clinic or [phone].        Care EveryWhere ID     This is your Care EveryWhere ID. This could be used by other organizations to access your [facility] medical records  [id number]        Your Vitals Were     Pulse Height BMI (Body Mass Index)             68 1.88 m (6' 2\") 28.4 kg/m2          Blood Pressure from Last 3 Encounters:   07/10/17 102/62   08/10/16 120/70   16 105/64    Weight from Last 3 Encounters:   07/10/17 100.3 kg (221 lb 3.2 oz)   08/10/16 102.1 kg (225 lb)   16 103.4 kg (228 lb)              We Performed the Following     Follow-Up with Cardiologist          Where to get your medicines      These medications were sent to [facility] [place], [place] - " [address]  [address] [facility] [address]     Phone:  [phone]     niacin 500 MG CR tablet          Primary Care Provider Office Phone # Fax #    Ugo Delgado [phone]       [facility] [address]  [facility] [address]        Goals        General    I will avoid all energy drinks. (pt-stated)     Notes - Note edited  1/7/2014  3:42 PM by Cherelle Mccarthy RN    As of today's date 1/7/2014 goal is met at 76 - 100%.   Goal Status:  Ongoing          Equal Access to Services     [facility]: Hadii aad ku hadasho Soomaali, waaxda luqadaha, qaybta kaalmada adeegyada, waxay idiin hayaan adeeg kharadanna laChristaaan . So [facility] [phone].    ATENCIÓN: Si habla español, tiene a cruz disposición servicios gratuitos de asistencia lingüística. Llame al [phone].    We comply with applicable federal civil rights laws and [place] laws. We do not discriminate on the basis of race, color, national origin, age, disability sex, sexual orientation or gender identity.            Thank you!     Thank you for choosing [facility] [place]  for your care. Our goal is always to provide you with excellent care. Hearing back from our patients is one way we can continue to improve our services. Please take a few minutes to complete the written survey that you may receive in the mail after your visit with us. Thank you!             Your Updated Medication List - Protect others around you: Learn how to safely use, store and throw away your medicines at www.disposemymeds.org.          This list is accurate as of: 7/10/17  3:38 PM.  Always use your most recent med list.                   Brand Name Dispense Instructions for use Diagnosis    albuterol (2.5 MG/3ML) 0.083% neb solution     25 vial    Take 1 vial (2.5 mg) by nebulization every 6 hours as needed for shortness of breath / dyspnea or wheezing    Cough       atorvastatin 20 MG tablet     LIPITOR    30 tablet    TAKE ONE TABLET BY MOUTH ONCE DAILY    Hyperlipidemia LDL goal <100       B-D U/F 31G X 8 MM   Generic drug:  insulin pen needle     100 each    USE ONCE DAILY OR AS DIRECTED    Type 2 diabetes mellitus with stage 3 chronic kidney disease, without long-term current use of insulin (H)       blood glucose monitoring lancets     1 Box    Use to test blood sugars 2 times daily or as directed. Dispense 100 lancets.    Type 2 diabetes, HbA1C goal < 8% (H)       blood glucose monitoring meter device kit    no brand specified    1 kit    Use to test blood sugar one time daily or as directed.    Type 2 diabetes mellitus with stage 3 chronic kidney disease (H)       * blood glucose monitoring test strip    ONE TOUCH ULTRA    100 each    Use to test blood sugars 3 times daily    Type 2 diabetes mellitus with stage 3 chronic kidney disease (H)       * blood glucose monitoring test strip    no brand specified    100 each    Use to test blood sugars one time daily or as directed    Type 2 diabetes mellitus with stage 3 chronic kidney disease, without long-term current use of insulin (H)       * ACCU-CHEK ADRIANNE PLUS test strip   Generic drug:  blood glucose monitoring     100 each    USE TO TEST BLOOD SUGARS ONCE DAILY OR AS DIRECTED    Type 2 diabetes mellitus with stage 3 chronic kidney disease (H)       liraglutide 18 MG/3ML soln    VICTOZA PEN    18 mL    INJECT 1.2MG UNDER THE SKIN DAILY    Type 2 diabetes mellitus with stage 3 chronic kidney disease, without long-term current use of insulin (H)       lisinopril 2.5 MG tablet    PRINIVIL/Zestril    90 tablet    Take 1 tablet (2.5 mg) by mouth daily    Coronary artery disease involving native coronary artery without angina pectoris, CKD (chronic kidney disease) stage 3, GFR 30-59 ml/min       metFORMIN 500 MG tablet    GLUCOPHAGE    180 tablet    Take 1 tablet (500 mg) by mouth 2 times daily (with meals)    Type 2 diabetes mellitus treated without  insulin (H)       metoprolol 25 MG 24 hr tablet    TOPROL-XL    90 tablet    Take 1 tablet (25 mg) by mouth daily    Coronary artery disease involving native coronary artery without angina pectoris       niacin 500 MG CR tablet    NIASPAN    90 tablet    Take 1 tablet (500 mg) by mouth At Bedtime    Coronary artery disease involving native coronary artery without angina pectoris, Hyperlipidemia with target LDL less than 100       order for DME     1 Units    Equipment being ordered: Nebulizer machine for home use    Cough       sildenafil 100 MG cap/tab    VIAGRA    6 tablet    Take 0.5-1 tablets ( mg) by mouth daily as needed for erectile dysfunction Take 30 min to 4 hours before intercourse.    Other male erectile dysfunction       warfarin 7.5 MG tablet    COUMADIN    100 tablet    Take 1/2 tablet (3.75 mg) Tuesday, Thursday, Saturday and Sunday, and 1 tablet (7.5 mg) on Monday, Wednesday and Friday, or as instructed.    Splenic infarction       * Notice:  This list has 3 medication(s) that are the same as other medications prescribed for you. Read the directions carefully, and ask your doctor or other care provider to review them with you.

## 2017-07-10 NOTE — LETTER
7/10/2017    Ugo Delgado MD  [facility]   [address]  [facility] [address]    RE: Osvaldo MATA Clapp       Dear Colleague,    I had the pleasure of seeing Osvaldo Parry in the [facility].    HPI and Plan:   Mr. Parry is a very pleasant 61-year-old gentleman with history of splenic infarct, heterozygous factor V Leiden, on chronic Coumadin therapy, echocardiogram in past showing mildly reduced LVEF around 50%-55% with some regional wall motion abnormalities and stress test in the past (2014) showing nontransmural infarct of apical, inferoseptal inferior wall with no ischemia.  Other comorbidities are moderately positive bubble study indicating likely a PFO, dyslipidemia with history of significant hypertriglyceridemia, diabetes and tobacco abuse.  The patient underwent an exercise nuclear stress test last year where he exercised for 9 minutes without any evidence of ischemia or infarct.  He also had an echocardiogram done 2016 showed stable aortic size of aortic root 4.1 cm, ascending aorta 3.9 cm.  LVEF was 50%-55% with mild anterior and inferior wall hypokinesia.   Today's coming for routine follow-up. He had to repeat echocardiogram that showed normal LV function with no regional wall motion abnormalities and ascending aorta and aortic root around 4.1 and 4 cm.    He is on lisinopril 2.5 mg q. daily, metoprolol succinate 25 mg q. daily, Lipitor 20 mg q. daily and  Coumadin.  In the past, his triglycerides have been quite high up to 691, but latest lipid panel  last year showed triglycerides of 192, LDL 66.  Total cholesterol 128.  He is on Niaspan additionally.  Unfortunately, he continues to smoke about half pack per day.  He has never quit in the past. He is fairly active, he works as an  and is up on his feet and has not experienced any shortness of breath or chest discomfort or dizziness, presyncope syncope.    Assessment and  plan  A very delightful 61-year-old gentleman with a history of coronary artery disease on the basis of regional wall motion abnormalities and nontransmural infarct on stress test 2014 with stress test 2016 showing normal perfusion.  Other comorbidities of diabetes, dyslipidemia, history of splenic infarct, factor V Leiden, on chronic Coumadin, moderately positive bubble study indicating a PFO and mildly dilated aortic root and ascending aorta.  Cardiovascular status-wise he is doing well. He had a repeat echocardiogram that showed resolution of wall motion abnormalities, normal LV function, mildly dilated aortic root 4.1 cm, ascending aorta of 4 cm- overall stable in size compared to previous echocardiogram. Unfortunately he continues to smoke half pack per day. I had a long discussion with patient regarding tobacco cessation. Patient tells me in the next few months he's gone completely quit tobacco. I did offer him nicotine replacement therapy and patient tells me that if he decides to use nicotine gum he will give my office or Dr. Delgado's office a call.  I recommend echocardiogram in 2 years time to follow up on mildly dilated aortic root and ascending aorta. We can see him in cardiology clinic in one to 2 years time, sooner if any exertion related symptoms or change in clinical status.      Orders Placed This Encounter   Procedures     Follow-Up with Cardiologist     Echocardiogram       No orders of the defined types were placed in this encounter.      Medications Discontinued During This Encounter   Medication Reason     levofloxacin (LEVAQUIN) 500 MG tablet Therapy completed     nicotine polacrilex (NICORETTE) 2 MG gum Stopped by Patient     oxyCODONE-acetaminophen (PERCOCET) 5-325 MG per tablet Stopped by Patient     penicillin V potassium (VEETID) 500 MG tablet Therapy completed     sildenafil (VIAGRA) 100 MG cap/tab Medication Reconciliation Clean Up     sulfamethoxazole-trimethoprim (BACTRIM DS,SEPTRA  DS) 800-160 MG per tablet Therapy completed         Encounter Diagnosis   Name Primary?     Thoracic aortic aneurysm without rupture (H)        CURRENT MEDICATIONS:  Current Outpatient Prescriptions   Medication Sig Dispense Refill     niacin (NIASPAN) 500 MG CR tablet Take 1 tablet (500 mg) by mouth At Bedtime 90 tablet 3     atorvastatin (LIPITOR) 20 MG tablet TAKE ONE TABLET BY MOUTH ONCE DAILY 30 tablet 1     B-D U/F 31G X 8 MM insulin pen needle USE ONCE DAILY OR AS DIRECTED 100 each 0     liraglutide (VICTOZA PEN) 18 MG/3ML soln INJECT 1.2MG UNDER THE SKIN DAILY 18 mL 0     metFORMIN (GLUCOPHAGE) 500 MG tablet Take 1 tablet (500 mg) by mouth 2 times daily (with meals) 180 tablet 0     ACCU-CHEK ADRIANNE PLUS test strip USE TO TEST BLOOD SUGARS ONCE DAILY OR AS DIRECTED 100 each 0     warfarin (COUMADIN) 7.5 MG tablet Take 1/2 tablet (3.75 mg) Tuesday, Thursday, Saturday and Sunday, and 1 tablet (7.5 mg) on Monday, Wednesday and Friday, or as instructed. 100 tablet 1     sildenafil (VIAGRA) 100 MG cap/tab Take 0.5-1 tablets ( mg) by mouth daily as needed for erectile dysfunction Take 30 min to 4 hours before intercourse. 6 tablet 2     blood glucose monitoring (NO BRAND SPECIFIED) test strip Use to test blood sugars one time daily or as directed 100 each 3     blood glucose monitoring (ONE TOUCH ULTRA) test strip Use to test blood sugars 3 times daily 100 each 1     blood glucose monitoring (NO BRAND SPECIFIED) meter device kit Use to test blood sugar one time daily or as directed. 1 kit 0     order for DME Equipment being ordered: Nebulizer machine for home use 1 Units 0     albuterol (2.5 MG/3ML) 0.083% nebulizer solution Take 1 vial (2.5 mg) by nebulization every 6 hours as needed for shortness of breath / dyspnea or wheezing 25 vial 1     blood glucose monitoring (ONE TOUCH DELICA) lancets Use to test blood sugars 2 times daily or as directed. Dispense 100 lancets. 1 Box 0     lisinopril  (PRINIVIL,ZESTRIL) 2.5 MG tablet Take 1 tablet (2.5 mg) by mouth daily 90 tablet 3     metoprolol (TOPROL-XL) 25 MG 24 hr tablet Take 1 tablet (25 mg) by mouth daily 90 tablet 3     [DISCONTINUED] sildenafil (VIAGRA) 100 MG cap/tab Take 0.5-1 tablets ( mg) by mouth daily as needed for erectile dysfunction Take 30 min to 4 hours before intercourse. 6 tablet 11       ALLERGIES     Allergies   Allergen Reactions     Codeine Sulfate      Hydrocodone      Other reaction(s): GI Upset       PAST MEDICAL HISTORY:  Past Medical History:   Diagnosis Date     CKD (chronic kidney disease)      Congenital anomaly of aorta, unspecified      mild aortic root and mild ascending aorta dilatation of 3.9 and 4.0 cm respectively     Factor 5 Leiden mutation, heterozygous (H)      Hyperlipidemia LDL goal < 100      Lymph nodes enlarged     enlarged mediastinal and bilateral hilar lymph nodes     Patent foramen ovale with right to left shunt      RBBB with left anterior fascicular block      Smoker      Splenic infarction      Type 2 diabetes mellitus (H)        PAST SURGICAL HISTORY:  No past surgical history on file.    FAMILY HISTORY:  No family history on file.    SOCIAL HISTORY:  Social History     Social History     Marital status: Single     Spouse name: N/A     Number of children: N/A     Years of education: N/A     Social History Main Topics     Smoking status: Current Every Day Smoker     Packs/day: 0.50     Smokeless tobacco: Never Used     Alcohol use No     Drug use: No     Sexual activity: Not Currently     Other Topics Concern     Caffeine Concern No     1 -2 soda's daily     Sleep Concern Yes     starting to be     Special Diet No     Exercise No     work - does a lot of walking     Social History Narrative       Review of Systems:  Skin:  Negative       Eyes:  Positive for glasses uses readers  ENT:  Positive for sinus trouble    Respiratory:  Negative       Cardiovascular:  Negative      Gastroenterology: Negative  "     Genitourinary:  Negative      Musculoskeletal:  Negative      Neurologic:  Negative      Psychiatric:  Negative      Heme/Lymph/Imm:  Positive for allergies RX allergies  Endocrine:  Positive for diabetes      Physical Exam:  Vitals: /62 (BP Location: Right arm, Patient Position: Chair, Cuff Size: Adult Regular)  Pulse 68  Ht 1.88 m (6' 2\")  Wt 100.3 kg (221 lb 3.2 oz)  BMI 28.4 kg/m2     General- appears comfortable  Neck- normal JVP, no bruit  Cardiovascular system- s1s2 normal, no m/r/g  Respiratory system- CTA b/l  Abdomen- soft, non tender  Extremities- no pedal edema  Neurological - alert, oriented  Psych- normal affect  HEENT- no pallor    CC  Joseph Nicolas MD   [facility]  [address]  [place] [address]      Thank you for allowing me to participate in the care of your patient.    Sincerely,     Joseph Nicolas MD     [facility]    "

## 2017-07-10 NOTE — PROGRESS NOTES
HPI and Plan:   Mr. Parry is a very pleasant 61-year-old gentleman with history of splenic infarct, heterozygous factor V Leiden, on chronic Coumadin therapy, echocardiogram in past showing mildly reduced LVEF around 50%-55% with some regional wall motion abnormalities and stress test in the past (2014) showing nontransmural infarct of apical, inferoseptal inferior wall with no ischemia.  Other comorbidities are moderately positive bubble study indicating likely a PFO, dyslipidemia with history of significant hypertriglyceridemia, diabetes and tobacco abuse.  The patient underwent an exercise nuclear stress test last year where he exercised for 9 minutes without any evidence of ischemia or infarct.  He also had an echocardiogram done 2016 showed stable aortic size of aortic root 4.1 cm, ascending aorta 3.9 cm.  LVEF was 50%-55% with mild anterior and inferior wall hypokinesia.   Today's coming for routine follow-up. He had to repeat echocardiogram that showed normal LV function with no regional wall motion abnormalities and ascending aorta and aortic root around 4.1 and 4 cm.    He is on lisinopril 2.5 mg q. daily, metoprolol succinate 25 mg q. daily, Lipitor 20 mg q. daily and  Coumadin.  In the past, his triglycerides have been quite high up to 691, but latest lipid panel  last year showed triglycerides of 192, LDL 66.  Total cholesterol 128.  He is on Niaspan additionally.  Unfortunately, he continues to smoke about half pack per day.  He has never quit in the past. He is fairly active, he works as an  and is up on his feet and has not experienced any shortness of breath or chest discomfort or dizziness, presyncope syncope.    Assessment and plan  A very delightful 61-year-old gentleman with a history of coronary artery disease on the basis of regional wall motion abnormalities and nontransmural infarct on stress test 2014 with stress test 2016 showing normal perfusion.  Other comorbidities  of diabetes, dyslipidemia, history of splenic infarct, factor V Leiden, on chronic Coumadin, moderately positive bubble study indicating a PFO and mildly dilated aortic root and ascending aorta.  Cardiovascular status-wise he is doing well. He had a repeat echocardiogram that showed resolution of wall motion abnormalities, normal LV function, mildly dilated aortic root 4.1 cm, ascending aorta of 4 cm- overall stable in size compared to previous echocardiogram. Unfortunately he continues to smoke half pack per day. I had a long discussion with patient regarding tobacco cessation. Patient tells me in the next few months he's gone completely quit tobacco. I did offer him nicotine replacement therapy and patient tells me that if he decides to use nicotine gum he will give my office or Dr. Delgado's office a call.  I recommend echocardiogram in 2 years time to follow up on mildly dilated aortic root and ascending aorta. We can see him in cardiology clinic in one to 2 years time, sooner if any exertion related symptoms or change in clinical status.      Orders Placed This Encounter   Procedures     Follow-Up with Cardiologist     Echocardiogram       No orders of the defined types were placed in this encounter.      Medications Discontinued During This Encounter   Medication Reason     levofloxacin (LEVAQUIN) 500 MG tablet Therapy completed     nicotine polacrilex (NICORETTE) 2 MG gum Stopped by Patient     oxyCODONE-acetaminophen (PERCOCET) 5-325 MG per tablet Stopped by Patient     penicillin V potassium (VEETID) 500 MG tablet Therapy completed     sildenafil (VIAGRA) 100 MG cap/tab Medication Reconciliation Clean Up     sulfamethoxazole-trimethoprim (BACTRIM DS,SEPTRA DS) 800-160 MG per tablet Therapy completed         Encounter Diagnosis   Name Primary?     Thoracic aortic aneurysm without rupture (H)        CURRENT MEDICATIONS:  Current Outpatient Prescriptions   Medication Sig Dispense Refill     niacin (NIASPAN) 500  MG CR tablet Take 1 tablet (500 mg) by mouth At Bedtime 90 tablet 3     atorvastatin (LIPITOR) 20 MG tablet TAKE ONE TABLET BY MOUTH ONCE DAILY 30 tablet 1     B-D U/F 31G X 8 MM insulin pen needle USE ONCE DAILY OR AS DIRECTED 100 each 0     liraglutide (VICTOZA PEN) 18 MG/3ML soln INJECT 1.2MG UNDER THE SKIN DAILY 18 mL 0     metFORMIN (GLUCOPHAGE) 500 MG tablet Take 1 tablet (500 mg) by mouth 2 times daily (with meals) 180 tablet 0     ACCU-CHEK ADRIANNE PLUS test strip USE TO TEST BLOOD SUGARS ONCE DAILY OR AS DIRECTED 100 each 0     warfarin (COUMADIN) 7.5 MG tablet Take 1/2 tablet (3.75 mg) Tuesday, Thursday, Saturday and Sunday, and 1 tablet (7.5 mg) on Monday, Wednesday and Friday, or as instructed. 100 tablet 1     sildenafil (VIAGRA) 100 MG cap/tab Take 0.5-1 tablets ( mg) by mouth daily as needed for erectile dysfunction Take 30 min to 4 hours before intercourse. 6 tablet 2     blood glucose monitoring (NO BRAND SPECIFIED) test strip Use to test blood sugars one time daily or as directed 100 each 3     blood glucose monitoring (ONE TOUCH ULTRA) test strip Use to test blood sugars 3 times daily 100 each 1     blood glucose monitoring (NO BRAND SPECIFIED) meter device kit Use to test blood sugar one time daily or as directed. 1 kit 0     order for DME Equipment being ordered: Nebulizer machine for home use 1 Units 0     albuterol (2.5 MG/3ML) 0.083% nebulizer solution Take 1 vial (2.5 mg) by nebulization every 6 hours as needed for shortness of breath / dyspnea or wheezing 25 vial 1     blood glucose monitoring (ONE TOUCH DELICA) lancets Use to test blood sugars 2 times daily or as directed. Dispense 100 lancets. 1 Box 0     lisinopril (PRINIVIL,ZESTRIL) 2.5 MG tablet Take 1 tablet (2.5 mg) by mouth daily 90 tablet 3     metoprolol (TOPROL-XL) 25 MG 24 hr tablet Take 1 tablet (25 mg) by mouth daily 90 tablet 3     [DISCONTINUED] sildenafil (VIAGRA) 100 MG cap/tab Take 0.5-1 tablets ( mg) by mouth  daily as needed for erectile dysfunction Take 30 min to 4 hours before intercourse. 6 tablet 11       ALLERGIES     Allergies   Allergen Reactions     Codeine Sulfate      Hydrocodone      Other reaction(s): GI Upset       PAST MEDICAL HISTORY:  Past Medical History:   Diagnosis Date     CKD (chronic kidney disease)      Congenital anomaly of aorta, unspecified      mild aortic root and mild ascending aorta dilatation of 3.9 and 4.0 cm respectively     Factor 5 Leiden mutation, heterozygous (H)      Hyperlipidemia LDL goal < 100      Lymph nodes enlarged     enlarged mediastinal and bilateral hilar lymph nodes     Patent foramen ovale with right to left shunt      RBBB with left anterior fascicular block      Smoker      Splenic infarction      Type 2 diabetes mellitus (H)        PAST SURGICAL HISTORY:  No past surgical history on file.    FAMILY HISTORY:  No family history on file.    SOCIAL HISTORY:  Social History     Social History     Marital status: Single     Spouse name: N/A     Number of children: N/A     Years of education: N/A     Social History Main Topics     Smoking status: Current Every Day Smoker     Packs/day: 0.50     Smokeless tobacco: Never Used     Alcohol use No     Drug use: No     Sexual activity: Not Currently     Other Topics Concern     Caffeine Concern No     1 -2 soda's daily     Sleep Concern Yes     starting to be     Special Diet No     Exercise No     work - does a lot of walking     Social History Narrative       Review of Systems:  Skin:  Negative       Eyes:  Positive for glasses uses readers  ENT:  Positive for sinus trouble    Respiratory:  Negative       Cardiovascular:  Negative      Gastroenterology: Negative      Genitourinary:  Negative      Musculoskeletal:  Negative      Neurologic:  Negative      Psychiatric:  Negative      Heme/Lymph/Imm:  Positive for allergies RX allergies  Endocrine:  Positive for diabetes      Physical Exam:  Vitals: /62 (BP Location: Right  "arm, Patient Position: Chair, Cuff Size: Adult Regular)  Pulse 68  Ht 1.88 m (6' 2\")  Wt 100.3 kg (221 lb 3.2 oz)  BMI 28.4 kg/m2     General- appears comfortable  Neck- normal JVP, no bruit  Cardiovascular system- s1s2 normal, no m/r/g  Respiratory system- CTA b/l  Abdomen- soft, non tender  Extremities- no pedal edema  Neurological - alert, oriented  Psych- normal affect  HEENT- no pallor          CC  Joseph Nicolas MD   [facility]  [address]  [place] [address]              "

## 2017-07-10 NOTE — TELEPHONE ENCOUNTER
No Prior Authorization Approval        Medication: niacin (NIASPAN) 500 MG CR tablet- PA Not Needed  Approved Dose/Quantity:   Reference #:     Insurance Company: Other (see comments)Comment:  [facility] [phone]  Expected CoPay: $0.00     CoPay Card Available:      Foundation Assistance Needed:    Which Pharmacy is filling the prescription (Not needed for infusion/clinic administered): [facility] [place], [place] - [address] [facility] [place]  Pharmacy Notified:  yes  Patient Notified:  yes    Pharmacy states no pa is needed. Patient p/u meds on 6/19/17 and paod $0.00 copay through his [employer] [facility] [employer] insurance. Pharmacist states patient is out of refills. Will route message to clinic to send new script to pharmacy.

## 2017-07-12 ENCOUNTER — TELEPHONE (OUTPATIENT)
Dept: INTERNAL MEDICINE | Facility: CLINIC | Age: 62
End: 2017-07-12

## 2017-07-12 DIAGNOSIS — J20.9 ACUTE BRONCHITIS, UNSPECIFIED ORGANISM: Primary | ICD-10-CM

## 2017-07-12 NOTE — TELEPHONE ENCOUNTER
Reason for call:  Patient reporting a symptom    Symptom or request: Congestion, sore throat    Duration (how long have symptoms been present): 4 days    Have you been treated for this before? No    Additional comments: Pt is stating he would like Dr. Delgado to send a Rx for a high dose of antibiotics to the [facility]. He has an appt on 07/21/17, but does not want to wait until then for Rx. Pt is concerned this may turn into pneumonia.    Phone Number patient can be reached at:  Cell number on file:    Telephone Information:   Mobile [phone]       Best Time:  anytime    Can we leave a detailed message on this number:  YES    Call taken on 7/12/2017 at 10:44 AM by Ada Saldivar

## 2017-07-13 RX ORDER — AZITHROMYCIN 250 MG/1
TABLET, FILM COATED ORAL
Qty: 6 TABLET | Refills: 0 | Status: SHIPPED | OUTPATIENT
Start: 2017-07-13 | End: 2017-08-25

## 2017-07-13 NOTE — TELEPHONE ENCOUNTER
Spoke with patient.   Agreed to fax Rx for Zithromax to his pharmacy.   Call if symptoms worsen or failing to improve prior to upcoming office appt later this month.

## 2017-07-19 DIAGNOSIS — E11.9 TYPE 2 DIABETES MELLITUS TREATED WITHOUT INSULIN (H): ICD-10-CM

## 2017-07-20 DIAGNOSIS — I25.10 CORONARY ARTERY DISEASE INVOLVING NATIVE CORONARY ARTERY WITHOUT ANGINA PECTORIS: ICD-10-CM

## 2017-07-20 DIAGNOSIS — N18.30 CKD (CHRONIC KIDNEY DISEASE) STAGE 3, GFR 30-59 ML/MIN (H): ICD-10-CM

## 2017-07-20 RX ORDER — LISINOPRIL 2.5 MG/1
2.5 TABLET ORAL DAILY
Qty: 90 TABLET | Refills: 3 | Status: SHIPPED | OUTPATIENT
Start: 2017-07-20 | End: 2018-06-29

## 2017-07-20 RX ORDER — METOPROLOL SUCCINATE 25 MG/1
25 TABLET, EXTENDED RELEASE ORAL DAILY
Qty: 90 TABLET | Refills: 3 | Status: SHIPPED | OUTPATIENT
Start: 2017-07-20 | End: 2018-06-29

## 2017-07-20 NOTE — TELEPHONE ENCOUNTER
Metformin         Last Written Prescription Date: 04/24/17  Last Fill Quantity: 180, # refills: 0  Last Office Visit with [facility], [facility] or  Health prescribing provider:  08/10/16   Next 5 appointments (look out 90 days)     Jul 21, 2017 10:20 AM CDT   SHORT with Ugo Delgado MD   [facility]    [address]  [facility] [id number] [phone]                   BP Readings from Last 3 Encounters:   07/10/17 102/62   08/10/16 120/70   05/24/16 105/64     Lab Results   Component Value Date    MICROL 28 08/10/2016     Lab Results   Component Value Date    UMALCR 12.97 08/10/2016     Creatinine   Date Value Ref Range Status   08/10/2016 1.40 (H) 0.66 - 1.25 mg/dL Final   ]  GFR Estimate   Date Value Ref Range Status   08/10/2016 52 (L) >60 mL/min/1.7m2 Final     Comment:     Non  GFR Calc   03/03/2016 69 >60 mL/min/1.7m2 Final     Comment:     Non  GFR Calc   03/02/2016 61 >60 mL/min/1.7m2 Final     Comment:     Non  GFR Calc     GFR Estimate If Black   Date Value Ref Range Status   08/10/2016 63 >60 mL/min/1.7m2 Final     Comment:      GFR Calc   03/03/2016 84 >60 mL/min/1.7m2 Final     Comment:      GFR Calc   03/02/2016 74 >60 mL/min/1.7m2 Final     Comment:      GFR Calc     Lab Results   Component Value Date    CHOL 128 08/10/2016     Lab Results   Component Value Date    HDL 22 08/10/2016     Lab Results   Component Value Date    LDL 66 08/10/2016     Lab Results   Component Value Date    TRIG 198 08/10/2016     Lab Results   Component Value Date    CHOLHDLRATIO 5.5 10/08/2015     Lab Results   Component Value Date    AST 23 08/10/2016     Lab Results   Component Value Date    ALT 39 08/10/2016     Lab Results   Component Value Date    A1C 6.1 08/10/2016    A1C 5.9 10/08/2015    A1C 6.0 03/24/2015    A1C 5.7 09/19/2014    A1C 7.0 03/04/2014     Potassium   Date  Value Ref Range Status   08/10/2016 4.4 3.4 - 5.3 mmol/L Final       Labs showing if normal/abnormal  Lab Results   Component Value Date    UCRR 212 08/10/2016    MICROL 28 08/10/2016     Lab Results   Component Value Date    CHOL 128 08/10/2016    TRIG 198 (H) 08/10/2016    HDL 22 (L) 08/10/2016    LDL 66 08/10/2016    VLDL 30 10/08/2015    CHOLHDLRATIO 5.5 (H) 10/08/2015     Lab Results   Component Value Date    A1C 6.1 (H) 08/10/2016    A1C 5.9 10/08/2015    A1C 6.0 03/24/2015

## 2017-08-13 DIAGNOSIS — N18.30 TYPE 2 DIABETES MELLITUS WITH STAGE 3 CHRONIC KIDNEY DISEASE, WITHOUT LONG-TERM CURRENT USE OF INSULIN (H): ICD-10-CM

## 2017-08-13 DIAGNOSIS — E11.9 TYPE 2 DIABETES MELLITUS TREATED WITHOUT INSULIN (H): ICD-10-CM

## 2017-08-13 DIAGNOSIS — E11.22 TYPE 2 DIABETES MELLITUS WITH STAGE 3 CHRONIC KIDNEY DISEASE (H): ICD-10-CM

## 2017-08-13 DIAGNOSIS — E78.5 HYPERLIPIDEMIA LDL GOAL <100: ICD-10-CM

## 2017-08-13 DIAGNOSIS — N18.30 TYPE 2 DIABETES MELLITUS WITH STAGE 3 CHRONIC KIDNEY DISEASE (H): ICD-10-CM

## 2017-08-13 DIAGNOSIS — E11.22 TYPE 2 DIABETES MELLITUS WITH STAGE 3 CHRONIC KIDNEY DISEASE, WITHOUT LONG-TERM CURRENT USE OF INSULIN (H): ICD-10-CM

## 2017-08-14 NOTE — TELEPHONE ENCOUNTER
liraglutide (VICTOZA PEN) 18 MG/3ML soln         Last Written Prescription Date: 05/24/17  Last Fill Quantity: 18ml, # refills: 0  Last Office Visit with G, P or [facility] prescribing provider:  08/10/16        BP Readings from Last 3 Encounters:   07/10/17 102/62   08/10/16 120/70   05/24/16 105/64     Lab Results   Component Value Date    MICROL 28 08/10/2016     Lab Results   Component Value Date    UMALCR 12.97 08/10/2016     Creatinine   Date Value Ref Range Status   08/10/2016 1.40 (H) 0.66 - 1.25 mg/dL Final   ]  GFR Estimate   Date Value Ref Range Status   08/10/2016 52 (L) >60 mL/min/1.7m2 Final     Comment:     Non  GFR Calc   03/03/2016 69 >60 mL/min/1.7m2 Final     Comment:     Non  GFR Calc   03/02/2016 61 >60 mL/min/1.7m2 Final     Comment:     Non  GFR Calc     GFR Estimate If Black   Date Value Ref Range Status   08/10/2016 63 >60 mL/min/1.7m2 Final     Comment:      GFR Calc   03/03/2016 84 >60 mL/min/1.7m2 Final     Comment:      GFR Calc   03/02/2016 74 >60 mL/min/1.7m2 Final     Comment:      GFR Calc     Lab Results   Component Value Date    CHOL 128 08/10/2016     Lab Results   Component Value Date    HDL 22 08/10/2016     Lab Results   Component Value Date    LDL 66 08/10/2016     Lab Results   Component Value Date    TRIG 198 08/10/2016     Lab Results   Component Value Date    CHOLHDLRATIO 5.5 10/08/2015     Lab Results   Component Value Date    AST 23 08/10/2016     Lab Results   Component Value Date    ALT 39 08/10/2016     Lab Results   Component Value Date    A1C 6.1 08/10/2016    A1C 5.9 10/08/2015    A1C 6.0 03/24/2015    A1C 5.7 09/19/2014    A1C 7.0 03/04/2014     Potassium   Date Value Ref Range Status   08/10/2016 4.4 3.4 - 5.3 mmol/L Final       Labs showing if normal/abnormal  Lab Results   Component Value Date    UCRR 212 08/10/2016    MICROL 28 08/10/2016     Lab Results    Component Value Date    CHOL 128 08/10/2016    TRIG 198 (H) 08/10/2016    HDL 22 (L) 08/10/2016    LDL 66 08/10/2016    VLDL 30 10/08/2015    CHOLHDLRATIO 5.5 (H) 10/08/2015     Lab Results   Component Value Date    A1C 6.1 (H) 08/10/2016    A1C 5.9 10/08/2015    A1C 6.0 03/24/2015         Atorvastatin     Last Written Prescription Date: 06/20/17  Last Fill Quantity: 30, # refills: 1  [place] [facility] or [facility] prescribing provider: 08/10/16       Lab Results   Component Value Date    CHOL 128 08/10/2016     Lab Results   Component Value Date    HDL 22 08/10/2016     Lab Results   Component Value Date    LDL 66 08/10/2016     Lab Results   Component Value Date    TRIG 198 08/10/2016     Lab Results   Component Value Date    CHOLHDLRATIO 5.5 10/08/2015       Labs showing if normal/abnormal  Lab Results   Component Value Date    CHOL 128 08/10/2016    TRIG 198 (H) 08/10/2016    HDL 22 (L) 08/10/2016    LDL 66 08/10/2016    VLDL 30 10/08/2015    CHOLHDLRATIO 5.5 (H) 10/08/2015         B-D U/F 31G X 8 MM insulin pen needle      Last Written Prescription Date: 06/20/17  Last Fill Quantity: 100 each,  # refills: 0   Last [place] [facility] or [facility] prescribing provider: 08/10/16    Metformin         Last Written Prescription Date: 07/20/17  Last Fill Quantity: 60, # refills: 0  Last Office Visit [place] [facility] or [facility] prescribing provider:  08/10/16        BP Readings from Last 3 Encounters:   07/10/17 102/62   08/10/16 120/70   05/24/16 105/64     Lab Results   Component Value Date    MICROL 28 08/10/2016     Lab Results   Component Value Date    UMALCR 12.97 08/10/2016     Creatinine   Date Value Ref Range Status   08/10/2016 1.40 (H) 0.66 - 1.25 mg/dL Final   ]  GFR Estimate   Date Value Ref Range Status   08/10/2016 52 (L) >60 mL/min/1.7m2 Final     Comment:     Non  GFR Calc   03/03/2016 69 >60 mL/min/1.7m2 Final     Comment:     Non  GFR Calc    03/02/2016 61 >60 mL/min/1.7m2 Final     Comment:     Non  GFR Calc     GFR Estimate If Black   Date Value Ref Range Status   08/10/2016 63 >60 mL/min/1.7m2 Final     Comment:      GFR Calc   03/03/2016 84 >60 mL/min/1.7m2 Final     Comment:      GFR Calc   03/02/2016 74 >60 mL/min/1.7m2 Final     Comment:      GFR Calc     Lab Results   Component Value Date    CHOL 128 08/10/2016     Lab Results   Component Value Date    HDL 22 08/10/2016     Lab Results   Component Value Date    LDL 66 08/10/2016     Lab Results   Component Value Date    TRIG 198 08/10/2016     Lab Results   Component Value Date    CHOLHDLRATIO 5.5 10/08/2015     Lab Results   Component Value Date    AST 23 08/10/2016     Lab Results   Component Value Date    ALT 39 08/10/2016     Lab Results   Component Value Date    A1C 6.1 08/10/2016    A1C 5.9 10/08/2015    A1C 6.0 03/24/2015    A1C 5.7 09/19/2014    A1C 7.0 03/04/2014     Potassium   Date Value Ref Range Status   08/10/2016 4.4 3.4 - 5.3 mmol/L Final       Labs showing if normal/abnormal  Lab Results   Component Value Date    UCRR 212 08/10/2016    MICROL 28 08/10/2016     Lab Results   Component Value Date    CHOL 128 08/10/2016    TRIG 198 (H) 08/10/2016    HDL 22 (L) 08/10/2016    LDL 66 08/10/2016    VLDL 30 10/08/2015    CHOLHDLRATIO 5.5 (H) 10/08/2015     Lab Results   Component Value Date    A1C 6.1 (H) 08/10/2016    A1C 5.9 10/08/2015    A1C 6.0 03/24/2015                                                ACCU-CHEK ADRIANNE PLUS test strip      Last Written Prescription Date: 04/24/17  Last Fill Quantity: 100 each,  # refills: 0   Last Office Visit with G, P or [facility] prescribing provider: 08/10/16

## 2017-08-22 NOTE — TELEPHONE ENCOUNTER
[facility] Prior Authorization Team   Phone: [phone]  Fax: [phone]    PA Initiation    Medication: niacin (NIASPAN) 500 MG CR tablet- PA Not Needed  Insurance Company: Other (see comments)Comment:  [facility] [phone]  Pharmacy Filling the Rx: [facility] [place], [place] - [address] [address]  Filling Pharmacy Phone: [phone]  Filling Pharmacy Fax:    Start Date: 8/22/2017

## 2017-08-23 ENCOUNTER — DOCUMENTATION ONLY (OUTPATIENT)
Dept: LAB | Facility: CLINIC | Age: 62
End: 2017-08-23

## 2017-08-23 DIAGNOSIS — E11.22 TYPE 2 DIABETES MELLITUS WITH STAGE 3 CHRONIC KIDNEY DISEASE (H): ICD-10-CM

## 2017-08-23 DIAGNOSIS — E11.22 TYPE 2 DIABETES MELLITUS WITH STAGE 3 CHRONIC KIDNEY DISEASE, WITHOUT LONG-TERM CURRENT USE OF INSULIN (H): Primary | ICD-10-CM

## 2017-08-23 DIAGNOSIS — N18.30 TYPE 2 DIABETES MELLITUS WITH STAGE 3 CHRONIC KIDNEY DISEASE (H): ICD-10-CM

## 2017-08-23 DIAGNOSIS — E78.5 HYPERLIPIDEMIA WITH TARGET LDL LESS THAN 100: ICD-10-CM

## 2017-08-23 DIAGNOSIS — N18.30 TYPE 2 DIABETES MELLITUS WITH STAGE 3 CHRONIC KIDNEY DISEASE, WITHOUT LONG-TERM CURRENT USE OF INSULIN (H): Primary | ICD-10-CM

## 2017-08-23 RX ORDER — LIRAGLUTIDE 6 MG/ML
INJECTION SUBCUTANEOUS
Qty: 18 ML | Refills: 0 | Status: SHIPPED | OUTPATIENT
Start: 2017-08-23 | End: 2017-11-09

## 2017-08-23 RX ORDER — PEN NEEDLE, DIABETIC 31 GX5/16"
NEEDLE, DISPOSABLE MISCELLANEOUS
Qty: 100 EACH | Refills: 0 | Status: SHIPPED | OUTPATIENT
Start: 2017-08-23 | End: 2017-09-13

## 2017-08-23 RX ORDER — ATORVASTATIN CALCIUM 20 MG/1
TABLET, FILM COATED ORAL
Qty: 30 TABLET | Refills: 0 | Status: SHIPPED | OUTPATIENT
Start: 2017-08-23 | End: 2017-09-13

## 2017-08-23 NOTE — TELEPHONE ENCOUNTER
Aster from [facility] called with additional questions, review is still pending but they will call us back with the result.

## 2017-08-24 DIAGNOSIS — E78.5 HYPERLIPIDEMIA WITH TARGET LDL LESS THAN 100: ICD-10-CM

## 2017-08-24 DIAGNOSIS — N18.30 TYPE 2 DIABETES MELLITUS WITH STAGE 3 CHRONIC KIDNEY DISEASE, WITHOUT LONG-TERM CURRENT USE OF INSULIN (H): ICD-10-CM

## 2017-08-24 DIAGNOSIS — E11.22 TYPE 2 DIABETES MELLITUS WITH STAGE 3 CHRONIC KIDNEY DISEASE, WITHOUT LONG-TERM CURRENT USE OF INSULIN (H): ICD-10-CM

## 2017-08-24 LAB
ALBUMIN SERPL-MCNC: 4 G/DL (ref 3.4–5)
ALP SERPL-CCNC: 67 U/L (ref 40–150)
ALT SERPL W P-5'-P-CCNC: 44 U/L (ref 0–70)
ANION GAP SERPL CALCULATED.3IONS-SCNC: 9 MMOL/L (ref 3–14)
AST SERPL W P-5'-P-CCNC: 35 U/L (ref 0–45)
BILIRUB SERPL-MCNC: 0.8 MG/DL (ref 0.2–1.3)
BUN SERPL-MCNC: 19 MG/DL (ref 7–30)
CALCIUM SERPL-MCNC: 9.2 MG/DL (ref 8.5–10.1)
CHLORIDE SERPL-SCNC: 105 MMOL/L (ref 94–109)
CHOLEST SERPL-MCNC: 123 MG/DL
CO2 SERPL-SCNC: 25 MMOL/L (ref 20–32)
CREAT SERPL-MCNC: 1.26 MG/DL (ref 0.66–1.25)
GFR SERPL CREATININE-BSD FRML MDRD: 58 ML/MIN/1.7M2
GLUCOSE SERPL-MCNC: 98 MG/DL (ref 70–99)
HBA1C MFR BLD: 6 % (ref 4.3–6)
HDLC SERPL-MCNC: 23 MG/DL
LDLC SERPL CALC-MCNC: 66 MG/DL
NONHDLC SERPL-MCNC: 100 MG/DL
POTASSIUM SERPL-SCNC: 4.4 MMOL/L (ref 3.4–5.3)
PROT SERPL-MCNC: 8.1 G/DL (ref 6.8–8.8)
SODIUM SERPL-SCNC: 139 MMOL/L (ref 133–144)
T4 FREE SERPL-MCNC: 1.21 NG/DL (ref 0.76–1.46)
TRIGL SERPL-MCNC: 171 MG/DL
TSH SERPL DL<=0.005 MIU/L-ACNC: 0.34 MU/L (ref 0.4–4)

## 2017-08-24 PROCEDURE — 80061 LIPID PANEL: CPT | Performed by: INTERNAL MEDICINE

## 2017-08-24 PROCEDURE — 80053 COMPREHEN METABOLIC PANEL: CPT | Performed by: INTERNAL MEDICINE

## 2017-08-24 PROCEDURE — 84443 ASSAY THYROID STIM HORMONE: CPT | Performed by: INTERNAL MEDICINE

## 2017-08-24 PROCEDURE — 83036 HEMOGLOBIN GLYCOSYLATED A1C: CPT | Performed by: INTERNAL MEDICINE

## 2017-08-24 PROCEDURE — 84439 ASSAY OF FREE THYROXINE: CPT | Performed by: INTERNAL MEDICINE

## 2017-08-24 PROCEDURE — 36415 COLL VENOUS BLD VENIPUNCTURE: CPT | Performed by: INTERNAL MEDICINE

## 2017-08-24 RX ORDER — BLOOD SUGAR DIAGNOSTIC
STRIP MISCELLANEOUS
Qty: 100 EACH | Refills: 0 | OUTPATIENT
Start: 2017-08-24

## 2017-08-24 NOTE — TELEPHONE ENCOUNTER
blood glucose monitoring (ACCU-CHEK ADRIANNE PLUS) test strip      Last Written Prescription Date: 08/23/17  Last Fill Quantity: 100,  # refills: 0   Last Office Visit with [facility], [facility] or [facility] prescribing provider: 08/10/16                                         Next 5 appointments (look out 90 days)     Aug 25, 2017  3:00 PM CDT   SHORT with Ugo Delgado MD   [facility]    [address]  [facility] [id number] [phone]

## 2017-08-25 ENCOUNTER — OFFICE VISIT (OUTPATIENT)
Dept: INTERNAL MEDICINE | Facility: CLINIC | Age: 62
End: 2017-08-25
Payer: COMMERCIAL

## 2017-08-25 ENCOUNTER — ANTICOAGULATION THERAPY VISIT (OUTPATIENT)
Dept: ANTICOAGULATION | Facility: CLINIC | Age: 62
End: 2017-08-25
Payer: COMMERCIAL

## 2017-08-25 VITALS
OXYGEN SATURATION: 96 % | HEART RATE: 76 BPM | BODY MASS INDEX: 27.09 KG/M2 | DIASTOLIC BLOOD PRESSURE: 59 MMHG | HEIGHT: 74 IN | SYSTOLIC BLOOD PRESSURE: 92 MMHG | WEIGHT: 211.1 LBS | TEMPERATURE: 98.3 F

## 2017-08-25 DIAGNOSIS — E78.5 HYPERLIPIDEMIA WITH TARGET LDL LESS THAN 100: ICD-10-CM

## 2017-08-25 DIAGNOSIS — D68.51 FACTOR 5 LEIDEN MUTATION, HETEROZYGOUS (H): ICD-10-CM

## 2017-08-25 DIAGNOSIS — Z12.11 SPECIAL SCREENING FOR MALIGNANT NEOPLASMS, COLON: ICD-10-CM

## 2017-08-25 DIAGNOSIS — N18.30 TYPE 2 DIABETES MELLITUS WITH STAGE 3 CHRONIC KIDNEY DISEASE, WITHOUT LONG-TERM CURRENT USE OF INSULIN (H): Primary | ICD-10-CM

## 2017-08-25 DIAGNOSIS — F17.200 SMOKER: ICD-10-CM

## 2017-08-25 DIAGNOSIS — Z79.01 LONG-TERM (CURRENT) USE OF ANTICOAGULANTS: ICD-10-CM

## 2017-08-25 DIAGNOSIS — E11.22 TYPE 2 DIABETES MELLITUS WITH STAGE 3 CHRONIC KIDNEY DISEASE, WITHOUT LONG-TERM CURRENT USE OF INSULIN (H): Primary | ICD-10-CM

## 2017-08-25 DIAGNOSIS — Q21.12 PATENT FORAMEN OVALE WITH RIGHT TO LEFT SHUNT: ICD-10-CM

## 2017-08-25 LAB — INR POINT OF CARE: 2.7 (ref 0.86–1.14)

## 2017-08-25 PROCEDURE — 99214 OFFICE O/P EST MOD 30 MIN: CPT | Performed by: INTERNAL MEDICINE

## 2017-08-25 PROCEDURE — 36416 COLLJ CAPILLARY BLOOD SPEC: CPT

## 2017-08-25 PROCEDURE — 85610 PROTHROMBIN TIME: CPT | Mod: QW

## 2017-08-25 PROCEDURE — 99207 ZZC NO CHARGE NURSE ONLY: CPT

## 2017-08-25 RX ORDER — VARENICLINE TARTRATE 1 MG/1
1 TABLET, FILM COATED ORAL 2 TIMES DAILY
Qty: 60 TABLET | Refills: 4 | Status: SHIPPED | OUTPATIENT
Start: 2017-08-25 | End: 2020-11-11

## 2017-08-25 NOTE — MR AVS SNAPSHOT
After Visit Summary   8/25/2017    Osvaldo Parry    MRN: [id number]           Patient Information     Date Of Birth          1955        Visit Information        Provider Department      8/25/2017 3:00 PM Ugo Delgado MD [facility]        Today's Diagnoses     Type 2 diabetes mellitus with stage 3 chronic kidney disease, without long-term current use of insulin (H)    -  1    Hyperlipidemia with target LDL less than 100        Patent foramen ovale with right to left shunt        Factor 5 Leiden mutation, heterozygous (H)        Smoker        Special screening for malignant neoplasms, colon          Care Instructions    No medication changes recommended.   Will refill any needed medications.     LDL-Cholesterol and blood pressure look fine.     See me in about six months to recheck diabetes.               Follow-ups after your visit        Your next 10 appointments already scheduled     Aug 25, 2017  3:00 PM CDT   SHORT with Ugo Delgado MD   [facility] ([facility])    [address]  [facility] [id number] [phone]            Aug 25, 2017  4:15 PM CDT   Anticoagulation Visit with [facility]   [facility] ([facility])    [address]  [facility] [id number] [phone]              Future tests that were ordered for you today     Open Future Orders        Priority Expected Expires Ordered    Fecal colorectal cancer screen (FIT) Routine 9/15/2017 11/17/2017 8/25/2017    Albumin Random Urine Quantitative with Creat Ratio Routine  11/24/2017 8/24/2017            Who to contact     If you have questions or need follow up information about today's clinic visit or your schedule please contact [facility] directly at [phone].  Normal or non-critical lab and imaging results will be communicated to you by MyChart, letter or phone within 4  "business days after the clinic has received the results. If you do not hear from us within 7 days, please contact the clinic through [employer] or phone. If you have a critical or abnormal lab result, we will notify you by phone as soon as possible.  Submit refill requests through [employer] or call your pharmacy and they will forward the refill request to us. Please allow 3 business days for your refill to be completed.          Additional Information About Your Visit        [employer] Information     [employer] lets you send messages to your doctor, view your test results, renew your prescriptions, schedule appointments and more. To sign up, go to [email or web address] . Click on \"Log in\" on the left side of the screen, which will take you to the Welcome page. Then click on \"Sign up Now\" on the right side of the page.     You will be asked to enter the access code listed below, as well as some personal information. Please follow the directions to create your username and password.     Your access code is: [email or web address]  Expires: 10/8/2017  3:37 PM     Your access code will  in 90 days. If you need help or a new code, please call your [place] clinic or [phone].        Care EveryWhere ID     This is your Care EveryWhere ID. This could be used by other organizations to access your [place] medical records  [id number]        Your Vitals Were     Pulse Temperature Height Pulse Oximetry BMI (Body Mass Index)       76 98.3  F (36.8  C) (Oral) 6' 2\" (1.88 m) 96% 27.1 kg/m2        Blood Pressure from Last 3 Encounters:   17 92/59   07/10/17 102/62   08/10/16 120/70    Weight from Last 3 Encounters:   17 211 lb 1.6 oz (95.8 kg)   07/10/17 221 lb 3.2 oz (100.3 kg)   08/10/16 225 lb (102.1 kg)                 Today's Medication Changes          These changes are accurate as of: 17 12:14 PM.  If you have any questions, ask your nurse or doctor.               Start taking these medicines.        " Dose/Directions    nicotine polacrilex 2 MG gum   Commonly known as:  NICORETTE   Used for:  Smoker   Started by:  Ugo Delgado MD        Dose:  2 mg   Place 1 each (2 mg) inside cheek as needed for smoking cessation   Quantity:  30 tablet   Refills:  11       * varenicline 0.5 MG X 11 & 1 MG X 42 tablet   Commonly known as:  CHANTIX STARTING MONTH ANTONIO   Used for:  Smoker   Started by:  Ugo Delgado MD        Take 0.5 mg tab daily for 3 days, then 0.5 mg tab twice daily for 4 days, then 1 mg twice daily.   Quantity:  53 tablet   Refills:  0       * varenicline 1 MG tablet   Commonly known as:  CHANTIX CONTINUING MONTH ANTONOI   Used for:  Smoker   Started by:  Ugo Delgado MD        Dose:  1 mg   Take 1 tablet (1 mg) by mouth 2 times daily   Quantity:  60 tablet   Refills:  4       * Notice:  This list has 2 medication(s) that are the same as other medications prescribed for you. Read the directions carefully, and ask your doctor or other care provider to review them with you.         Where to get your medicines      These medications were sent to [facility] [place] [address]     Phone:  [phone]     nicotine polacrilex 2 MG gum    varenicline 0.5 MG X 11 & 1 MG X 42 tablet    varenicline 1 MG tablet                Primary Care Provider Office Phone # Fax #    Ugo Delgado [phone]       [address] [facility] [address]  [facility] [address]        Goals        General    I will avoid all energy drinks. (pt-stated)     Notes - Note edited  1/7/2014  3:42 PM by Cherelle Mccarthy, RN    As of today's date 1/7/2014 goal is met at 76 - 100%.   Goal Status:  Ongoing          Equal Access to Services     PERRY HARTMANN AH: Serg Kline, waallida lencho, qasherinta kaalbest kellogg, cheryl addison. [facility] [phone].    ATENCIÓN: Si habla español, tiene a cruz disposición  servicios gratuitos de asistencia lingüística. Adam barrow [phone].    We comply with applicable federal civil rights laws and [place] laws. We do not discriminate on the basis of race, color, national origin, age, disability sex, sexual orientation or gender identity.            Thank you!     Thank you for choosing [facility]  for your care. Our goal is always to provide you with excellent care. Hearing back from our patients is one way we can continue to improve our services. Please take a few minutes to complete the written survey that you may receive in the mail after your visit with us. Thank you!             Your Updated Medication List - Protect others around you: Learn how to safely use, store and throw away your medicines at www.disposemymeds.org.          This list is accurate as of: 8/25/17 12:14 PM.  Always use your most recent med list.                   Brand Name Dispense Instructions for use Diagnosis    albuterol (2.5 MG/3ML) 0.083% neb solution     25 vial    Take 1 vial (2.5 mg) by nebulization every 6 hours as needed for shortness of breath / dyspnea or wheezing    Cough       atorvastatin 20 MG tablet    LIPITOR    30 tablet    TAKE ONE TABLET BY MOUTH ONCE DAILY    Hyperlipidemia LDL goal <100       B-D U/F 31G X 8 MM   Generic drug:  insulin pen needle     100 each    USE ONCE DAILY OR AS DIRECTED    Type 2 diabetes mellitus with stage 3 chronic kidney disease, without long-term current use of insulin (H)       blood glucose monitoring lancets     1 Box    Use to test blood sugars 2 times daily or as directed. Dispense 100 lancets.    Type 2 diabetes, HbA1C goal < 8% (H)       blood glucose monitoring meter device kit    no brand specified    1 kit    Use to test blood sugar one time daily or as directed.    Type 2 diabetes mellitus with stage 3 chronic kidney disease (H)       * blood glucose monitoring test strip    ONE TOUCH ULTRA    100 each    Use to test blood  sugars 3 times daily    Type 2 diabetes mellitus with stage 3 chronic kidney disease (H)       * blood glucose monitoring test strip    no brand specified    100 each    Use to test blood sugars one time daily or as directed    Type 2 diabetes mellitus with stage 3 chronic kidney disease, without long-term current use of insulin (H)       * blood glucose monitoring test strip    ACCU-CHEK ADRIANNE PLUS    100 each    Pt checks BS 1x daily    Type 2 diabetes mellitus with stage 3 chronic kidney disease (H)       lisinopril 2.5 MG tablet    PRINIVIL/Zestril    90 tablet    Take 1 tablet (2.5 mg) by mouth daily    Coronary artery disease involving native coronary artery without angina pectoris, CKD (chronic kidney disease) stage 3, GFR 30-59 ml/min       metFORMIN 500 MG tablet    GLUCOPHAGE    60 tablet    TAKE ONE TABLET BY MOUTH TWICE A DAY WITH MEALS    Type 2 diabetes mellitus treated without insulin (H)       metoprolol 25 MG 24 hr tablet    TOPROL-XL    90 tablet    Take 1 tablet (25 mg) by mouth daily    Coronary artery disease involving native coronary artery without angina pectoris       niacin 500 MG CR tablet    NIASPAN    90 tablet    Take 1 tablet (500 mg) by mouth At Bedtime    Coronary artery disease involving native coronary artery without angina pectoris, Hyperlipidemia with target LDL less than 100       nicotine polacrilex 2 MG gum    NICORETTE    30 tablet    Place 1 each (2 mg) inside cheek as needed for smoking cessation    Smoker       order for DME     1 Units    Equipment being ordered: Nebulizer machine for home use    Cough       sildenafil 100 MG cap/tab    VIAGRA    6 tablet    Take 0.5-1 tablets ( mg) by mouth daily as needed for erectile dysfunction Take 30 min to 4 hours before intercourse.    Other male erectile dysfunction       * varenicline 0.5 MG X 11 & 1 MG X 42 tablet    CHANTIX STARTING MONTH ANTONIO    53 tablet    Take 0.5 mg tab daily for 3 days, then 0.5 mg tab twice daily  for 4 days, then 1 mg twice daily.    Smoker       * varenicline 1 MG tablet    CHANTIX CONTINUING MONTH ANTONIO    60 tablet    Take 1 tablet (1 mg) by mouth 2 times daily    Smoker       VICTOZA PEN 18 MG/3ML soln   Generic drug:  liraglutide     18 mL    INJECT 1.2MG UNDER THE SKIN DAILY    Type 2 diabetes mellitus with stage 3 chronic kidney disease, without long-term current use of insulin (H)       warfarin 7.5 MG tablet    COUMADIN    100 tablet    Take 1/2 tablet (3.75 mg) Tuesday, Thursday, Saturday and Sunday, and 1 tablet (7.5 mg) on Monday, Wednesday and Friday, or as instructed.    Splenic infarction       * Notice:  This list has 5 medication(s) that are the same as other medications prescribed for you. Read the directions carefully, and ask your doctor or other care provider to review them with you.

## 2017-08-25 NOTE — LETTER
"  [facility]  [address] [place] [address]  [phone]    8/25/2017    Osvaldo MATA Sohan  [address] [facility] [address]           Dear Ayde Sohan,    The results of your lab tests are enclosed. Everything looks fine. Unless noted otherwise below, any results that are outside the \"normal\" range are within acceptable limits and are of no concern.    Hemoglobin A1C measures control of diabetes. Your Hemoglobin A1C is 6.1. The ideal target is under 7.0, although below 8.0 may be acceptable for some patients.    LDL= Bad Cholesterol-- the target is below 100.     HDL= Good Cholesterol-- although this is determined mostly by heredity, exercise and/or medications may sometimes raise this number.    Triglycerides are another type of fat in the blood, and can sometimes be lowered by reducing intake of sweets or excess carbohydrates, alcohol, and by weight reduction if needed.  Sometimes medications are also used.    AST and ALT are liver tests, as are the bilirubin (total and direct), albumin, total protein, and alkaline phosphatase. Yours are all normal.     Urea Nitrogen and Creatinine are kidney tests--yours are normal. GFR stands for Glomerular Filtration Rate, a more complicated estimate of kidney function.    Sodium, Potassium, Chloride, Carbon Dioxide, and Calcium are all normal salts in the bloodstream. Yours all look normal. Your glucose (blood sugar) also looks fine. (You can ignore the anion gap result).     TSH measures thyroid function. Yours is fine.  Your Free T4 level is the actual level of thyroid hormone circulating in your blood. Yours is normal.               If you have any further questions or problems, please contact our office.    Sincerely,        Ugo Delgado MD  Attachment: Lab results     "

## 2017-08-25 NOTE — PROGRESS NOTES
ANTICOAGULATION FOLLOW-UP CLINIC VISIT    Patient Name:  Osvaldo Parry  Date:  8/25/2017  Contact Type:  Face to Face    SUBJECTIVE:     Patient Findings     Positives No Problem Findings           OBJECTIVE    INR Protime   Date Value Ref Range Status   08/25/2017 2.7 (A) 0.86 - 1.14 Final       ASSESSMENT / PLAN  INR assessment THER    Recheck INR In: 6 WEEKS    INR Location Clinic      Anticoagulation Summary as of 8/25/2017     INR goal 2.0-3.0   Today's INR 2.7   Maintenance plan 7.5 mg (7.5 mg x 1) on Mon, Wed, Fri; 3.75 mg (7.5 mg x 0.5) all other days   Full instructions 7.5 mg on Mon, Wed, Fri; 3.75 mg all other days   Weekly total 37.5 mg   No change documented Kaitlin Lassiter RN   Plan last modified Kaitlin Lassiter RN (8/10/2016)   Next INR check 10/6/2017   Target end date     Indications   Long-term (current) use of anticoagulants [Z79.01] [Z79.01]  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    Kaitlin Lassiter RN

## 2017-08-25 NOTE — PATIENT INSTRUCTIONS
No medication changes recommended.   Will refill any needed medications.     LDL-Cholesterol and blood pressure look fine.     See me in about six months to recheck diabetes.

## 2017-08-25 NOTE — PROGRESS NOTES
"  SUBJECTIVE:   Osvaldo Parry is a 62 year old male who presents to clinic today for the following health issues:  diabetes mellitus, hyperlipidemia, hypertension.     Diabetes Follow-up    Patient is checking blood sugars: once daily.  Results are as follows:       am - 105-140    Diabetic concerns: None and other - weight loss     Symptoms of hypoglycemia (low blood sugar): confusion     Paresthesias (numbness or burning in feet) or sores: No   Date of last diabetic eye exam: unknown    Amount of exercise or physical activity: daily activities    Problems taking medications regularly: No    Medication side effects: weight loss  Diet: low salt and low fat/cholesterol    Patient states that there are days when he will \"feel funny\". He believes that symptoms are due to his medication because he is currently taking his Victoza injections according to weighing 220 lbs, but now he is 208 lbs. He has been on a diabetic diet and exercise regime. Patient also reports nausea.     The patient expresses an interest in smoking cessation and is provided an Rx for Chantix.     Problem list and histories reviewed & adjusted, as indicated.  Additional history: as documented    Reviewed and updated as needed this visit by clinical staffTobacco  Allergies  Meds  Med Hx  Surg Hx  Fam Hx  Soc Hx      Reviewed and updated as needed this visit by Provider         ROS:  No dyspnea or cough. No chest discomfort, dizziness or palpitations. No diarrhea, abdominal pain or rectal bleeding.   No acute problems with vision or speech, lateralizing weakness or paresthesias.    ROS: as above or negative for Respiratory, CV, GI, endocrine, neuro systems.    This document serves as a record of the services and decisions personally performed and made by Ugo Delgado MD. It was created on his behalf by Brianne Brock, a trained medical scribe. The creation of this document is based on the provider's statements to the medical " "shelbi  Brianne Carrascoto August 25, 2017 12:00 PM     OBJECTIVE:     BP 92/59 (BP Location: Right arm, Patient Position: Sitting, Cuff Size: Adult Large)  Pulse 76  Temp 98.3  F (36.8  C) (Oral)  Ht 1.88 m (6' 2\")  Wt 95.8 kg (211 lb 1.6 oz)  SpO2 96%  BMI 27.1 kg/m2  Body mass index is 27.1 kg/(m^2).    GENERAL: healthy, alert and no distress  RESP: lungs clear to auscultation - no rales, rhonchi or wheezes  CV: regular rate and rhythm, normal S1 S2, no S3 or S4, no murmur, click or rub, no peripheral edema and peripheral pulses strong  MS: no gross musculoskeletal defects noted, no edema  SKIN: no suspicious lesions or rashes  NEURO: Normal strength and tone, mentation intact and speech normal  PSYCH: mentation appears normal, affect normal/bright    ASSESSMENT/PLAN:   (E11.22,  N18.3) Type 2 diabetes mellitus with stage 3 chronic kidney disease, without long-term current use of insulin (H)  (primary encounter diagnosis)  Comment: A1c at target. Continue current measures.    (E78.5) Hyperlipidemia with target LDL less than 100  Comment: LDL at target. Continue current meds.    (Q21.1) Patent foramen ovale with right to left shunt  Comment: Continue following with specialists as recommended.     (D68.51) Factor 5 Leiden mutation, heterozygous (H)  Comment: Continue following with specialists as recommended.     (F17.200) Smoker  Comment: Patient will start Chantix. Discussed possible side effects to the medication.   Plan: varenicline (CHANTIX STARTING MONTH PAK) 0.5 MG        X 11 & 1 MG X 42 tablet, varenicline (CHANTIX         CONTINUING MONTH ANTONIO) 1 MG tablet, nicotine         polacrilex (NICORETTE) 2 MG gum          (Z12.11) Special screening for malignant neoplasms, colon  Comment: Fit test ordered today  Plan: Fecal colorectal cancer screen (FIT)         FUTURE APPOINTMENTS:       - Follow-up visit in 6 months    Patient Instructions   No medication changes recommended.   Will refill any needed " medications.     LDL-Cholesterol and blood pressure look fine.     See me in about six months to recheck diabetes.         The information in this document, created by the medical scribe for me, accurately reflects the services I personally performed and the decisions made by me. I have reviewed and approved this document for accuracy prior to leaving the patient care area.  August 25, 2017 11:59 AM    Ugo Delgado MD  [facility]

## 2017-08-25 NOTE — MR AVS SNAPSHOT
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]  Factor 5 Leiden mutation  heterozygous (H) [D68.51]         Your next Anticoagulation Clinic appointment(s)     Aug 25, 2017  4:15 PM CDT   Anticoagulation Visit with [facility]   [facility]    [address] [facility] [address]  [facility] [address] [phone]              Contact Numbers     [facility] Phone Numbers:  Anticoagulation Clinic Appointments : [phone]  Anticoagulation Nurse: [phone]         August 2017 Details    Sun Mon Tue Wed Thu Fri Sat       1               2               3               4               5                 6               7               8               9               10               11               12                 13               14               15               16               17               18               19                 20               21               22               23               24               25      7.5 mg   See details      26      3.75 mg           27      3.75 mg         28      7.5 mg         29      3.75 mg         30      7.5 mg         31      3.75 mg            Date Details   08/25 This INR check               How to take your warfarin dose     To take:  3.75 mg Take 0.5 of a 7.5 mg tablet.    To take:  7.5 mg Take 1 of the 7.5 mg tablets.           September 2017 Details    Sun Mon Tue Wed Thu Fri Sat          1      7.5 mg         2      3.75 mg            3      3.75 mg         4      7.5 mg         5      3.75 mg         6      7.5 mg         7      3.75 mg         8      7.5 mg         9      3.75 mg           10      3.75 mg         11      7.5 mg         12      3.75 mg         13      7.5 mg         14      3.75 mg         15      7.5 mg         16      3.75 mg           17      3.75 mg         18      7.5 mg         19      3.75 mg         20      7.5 mg         21      3.75 mg         22      7.5 mg         23      3.75 mg           24      3.75 mg         25      7.5 mg         26      3.75 mg         27      7.5 mg         28      3.75 mg         29      7.5 mg         30      3.75 mg          Date Details   No additional details            How to take your warfarin dose     To take:  3.75 mg Take 0.5 of a 7.5 mg tablet.    To take:  7.5 mg Take 1 of the 7.5 mg tablets.           October 2017 Details    Sun Mon Tue Wed Thu Fri Sat     1      3.75 mg         2      7.5 mg         3      3.75 mg         4      7.5 mg         5      3.75 mg         6            7                 8               9               10               11               12               13               14                 15               16               17               18               19               20               21                 22               23               24               25               26               27               28                 29               30               31                    Date Details   No additional details    Date of next INR:  10/6/2017         How to take your warfarin dose     To take:  3.75 mg Take 0.5 of a 7.5 mg tablet.    To take:  7.5 mg Take 1 of the 7.5 mg tablets.

## 2017-08-25 NOTE — NURSING NOTE
"Chief Complaint   Patient presents with     Recheck Medication       Initial BP 92/59 (BP Location: Right arm, Patient Position: Sitting, Cuff Size: Adult Large)  Pulse 76  Temp 98.3  F (36.8  C) (Oral)  Ht 6' 2\" (1.88 m)  Wt 211 lb 1.6 oz (95.8 kg)  SpO2 96%  BMI 27.1 kg/m2 Estimated body mass index is 27.1 kg/(m^2) as calculated from the following:    Height as of this encounter: 6' 2\" (1.88 m).    Weight as of this encounter: 211 lb 1.6 oz (95.8 kg).  Medication Reconciliation: complete   Cristal TEAGUE      "

## 2017-08-29 NOTE — TELEPHONE ENCOUNTER
Prior Authorization Approval    Authorization Effective Date: 8/9/2017  Authorization Expiration Date: 8/23/2018  Medication: niacin (NIASPAN) 500 MG CR tablet- Approved  Approved Dose/Quantity:   Reference #: [id number]     Insurance Company: Other (see comments)Comment:  [facility] [phone]  Expected CoPay: refill too soon-copay unknown     CoPay Card Available:      Foundation Assistance Needed:    Which Pharmacy is filling the prescription (Not needed for infusion/clinic administered): [facility] [place], [place] - [address] [facility]  Pharmacy Notified: Yes  Patient Notified: Yes

## 2017-09-13 DIAGNOSIS — E11.22 TYPE 2 DIABETES MELLITUS WITH STAGE 3 CHRONIC KIDNEY DISEASE, WITHOUT LONG-TERM CURRENT USE OF INSULIN (H): ICD-10-CM

## 2017-09-13 DIAGNOSIS — E78.5 HYPERLIPIDEMIA LDL GOAL <100: ICD-10-CM

## 2017-09-13 DIAGNOSIS — N18.30 TYPE 2 DIABETES MELLITUS WITH STAGE 3 CHRONIC KIDNEY DISEASE, WITHOUT LONG-TERM CURRENT USE OF INSULIN (H): ICD-10-CM

## 2017-09-13 DIAGNOSIS — E11.9 TYPE 2 DIABETES MELLITUS TREATED WITHOUT INSULIN (H): ICD-10-CM

## 2017-09-14 NOTE — TELEPHONE ENCOUNTER
LIPITOR     Last Written Prescription Date: 08/23/17  Last Fill Quantity: 30, # refills: 0  Last Office Visit with [facility], [facility] or [facility] prescribing provider: 08/25/17       Lab Results   Component Value Date    CHOL 123 08/24/2017     Lab Results   Component Value Date    HDL 23 08/24/2017     Lab Results   Component Value Date    LDL 66 08/24/2017     Lab Results   Component Value Date    TRIG 171 08/24/2017     Lab Results   Component Value Date    CHOLHDLRATIO 5.5 10/08/2015       Labs showing if normal/abnormal  Lab Results   Component Value Date    CHOL 123 08/24/2017    TRIG 171 (H) 08/24/2017    HDL 23 (L) 08/24/2017    LDL 66 08/24/2017    VLDL 30 10/08/2015    CHOLHDLRATIO 5.5 (H) 10/08/2015     BD UF [id number]         Last Written Prescription Date: 08/23/17  Last Fill Quantity: 100, # refills: 0  Last Office Visit with [facility], [facility] or [facility] prescribing provider:  08/25/17        BP Readings from Last 3 Encounters:   08/25/17 92/59   07/10/17 102/62   08/10/16 120/70     Lab Results   Component Value Date    MICROL 28 08/10/2016     Lab Results   Component Value Date    UMALCR 12.97 08/10/2016     Creatinine   Date Value Ref Range Status   08/24/2017 1.26 (H) 0.66 - 1.25 mg/dL Final   ]  GFR Estimate   Date Value Ref Range Status   08/24/2017 58 (L) >60 mL/min/1.7m2 Final     Comment:     Non  GFR Calc   08/10/2016 52 (L) >60 mL/min/1.7m2 Final     Comment:     Non  GFR Calc   03/03/2016 69 >60 mL/min/1.7m2 Final     Comment:     Non  GFR Calc     GFR Estimate If Black   Date Value Ref Range Status   08/24/2017 71 >60 mL/min/1.7m2 Final     Comment:      GFR Calc   08/10/2016 63 >60 mL/min/1.7m2 Final     Comment:      GFR Calc   03/03/2016 84 >60 mL/min/1.7m2 Final     Comment:      GFR Calc     Lab Results   Component Value Date    CHOL 123 08/24/2017     Lab Results   Component Value Date    HDL 23 08/24/2017      Lab Results   Component Value Date    LDL 66 08/24/2017     Lab Results   Component Value Date    TRIG 171 08/24/2017     Lab Results   Component Value Date    CHOLHDLRATIO 5.5 10/08/2015     Lab Results   Component Value Date    AST 35 08/24/2017     Lab Results   Component Value Date    ALT 44 08/24/2017     Lab Results   Component Value Date    A1C 6.0 08/24/2017    A1C 6.1 08/10/2016    A1C 5.9 10/08/2015    A1C 6.0 03/24/2015    A1C 5.7 09/19/2014     Potassium   Date Value Ref Range Status   08/24/2017 4.4 3.4 - 5.3 mmol/L Final     METFORMIN         Last Written Prescription Date: 08/23/17  Last Fill Quantity: 60, # refills: 0  Last Office Visit with [facility], [facility] or [facility] prescribing provider:  08/25/17        BP Readings from Last 3 Encounters:   08/25/17 92/59   07/10/17 102/62   08/10/16 120/70     Lab Results   Component Value Date    MICROL 28 08/10/2016     Lab Results   Component Value Date    UMALCR 12.97 08/10/2016     Creatinine   Date Value Ref Range Status   08/24/2017 1.26 (H) 0.66 - 1.25 mg/dL Final   ]  GFR Estimate   Date Value Ref Range Status   08/24/2017 58 (L) >60 mL/min/1.7m2 Final     Comment:     Non  GFR Calc   08/10/2016 52 (L) >60 mL/min/1.7m2 Final     Comment:     Non  GFR Calc   03/03/2016 69 >60 mL/min/1.7m2 Final     Comment:     Non  GFR Calc     GFR Estimate If Black   Date Value Ref Range Status   08/24/2017 71 >60 mL/min/1.7m2 Final     Comment:      GFR Calc   08/10/2016 63 >60 mL/min/1.7m2 Final     Comment:      GFR Calc   03/03/2016 84 >60 mL/min/1.7m2 Final     Comment:      GFR Calc     Lab Results   Component Value Date    CHOL 123 08/24/2017     Lab Results   Component Value Date    HDL 23 08/24/2017     Lab Results   Component Value Date    LDL 66 08/24/2017     Lab Results   Component Value Date    TRIG 171 08/24/2017     Lab Results   Component Value Date     CHOLHDLRATIO 5.5 10/08/2015     Lab Results   Component Value Date    AST 35 08/24/2017     Lab Results   Component Value Date    ALT 44 08/24/2017     Lab Results   Component Value Date    A1C 6.0 08/24/2017    A1C 6.1 08/10/2016    A1C 5.9 10/08/2015    A1C 6.0 03/24/2015    A1C 5.7 09/19/2014     Potassium   Date Value Ref Range Status   08/24/2017 4.4 3.4 - 5.3 mmol/L Final       Labs showing if normal/abnormal  Lab Results   Component Value Date    UCRR 212 08/10/2016    MICROL 28 08/10/2016     Lab Results   Component Value Date    CHOL 123 08/24/2017    TRIG 171 (H) 08/24/2017    HDL 23 (L) 08/24/2017    LDL 66 08/24/2017    VLDL 30 10/08/2015    CHOLHDLRATIO 5.5 (H) 10/08/2015     Lab Results   Component Value Date    A1C 6.0 08/24/2017    A1C 6.1 (H) 08/10/2016    A1C 5.9 10/08/2015

## 2017-09-16 RX ORDER — ATORVASTATIN CALCIUM 20 MG/1
TABLET, FILM COATED ORAL
Qty: 90 TABLET | Refills: 1 | Status: SHIPPED | OUTPATIENT
Start: 2017-09-16 | End: 2018-04-11

## 2017-09-16 RX ORDER — PEN NEEDLE, DIABETIC 31 GX5/16"
NEEDLE, DISPOSABLE MISCELLANEOUS
Qty: 100 EACH | Refills: 1 | Status: SHIPPED | OUTPATIENT
Start: 2017-09-16 | End: 2018-06-17

## 2017-10-09 DIAGNOSIS — E11.22 TYPE 2 DIABETES MELLITUS WITH STAGE 3 CHRONIC KIDNEY DISEASE (H): ICD-10-CM

## 2017-10-09 DIAGNOSIS — N18.30 TYPE 2 DIABETES MELLITUS WITH STAGE 3 CHRONIC KIDNEY DISEASE (H): ICD-10-CM

## 2017-10-09 RX ORDER — BLOOD SUGAR DIAGNOSTIC
STRIP MISCELLANEOUS
Qty: 100 EACH | Refills: 0 | Status: SHIPPED | OUTPATIENT
Start: 2017-10-09 | End: 2017-12-11

## 2017-10-09 NOTE — TELEPHONE ENCOUNTER
Accu-Chek Meera test strips      Last Written Prescription Date: 08/23/17  Last Fill Quantity: 100,  # refills: 0   Last Office Visit with [facility], [facility] or [facility] prescribing provider: 08/25/17

## 2017-11-09 DIAGNOSIS — E11.22 TYPE 2 DIABETES MELLITUS WITH STAGE 3 CHRONIC KIDNEY DISEASE, WITHOUT LONG-TERM CURRENT USE OF INSULIN (H): ICD-10-CM

## 2017-11-09 DIAGNOSIS — N18.30 TYPE 2 DIABETES MELLITUS WITH STAGE 3 CHRONIC KIDNEY DISEASE, WITHOUT LONG-TERM CURRENT USE OF INSULIN (H): ICD-10-CM

## 2017-11-09 RX ORDER — LIRAGLUTIDE 6 MG/ML
INJECTION SUBCUTANEOUS
Qty: 18 ML | Refills: 1 | Status: SHIPPED | OUTPATIENT
Start: 2017-11-09 | End: 2018-03-01

## 2017-11-15 ENCOUNTER — TELEPHONE (OUTPATIENT)
Dept: INTERNAL MEDICINE | Facility: CLINIC | Age: 62
End: 2017-11-15

## 2017-11-15 NOTE — TELEPHONE ENCOUNTER
Panel Management Review      Patient has the following on his problem list:     Diabetes    ASA: monitor    Last A1C  Lab Results   Component Value Date    A1C 6.0 08/24/2017    A1C 6.1 08/10/2016    A1C 5.9 10/08/2015    A1C 6.0 03/24/2015    A1C 5.7 09/19/2014     A1C tested: Passed    Last LDL:    Lab Results   Component Value Date    CHOL 123 08/24/2017     Lab Results   Component Value Date    HDL 23 08/24/2017     Lab Results   Component Value Date    LDL 66 08/24/2017     Lab Results   Component Value Date    TRIG 171 08/24/2017     Lab Results   Component Value Date    CHOLHDLRATIO 5.5 10/08/2015     Lab Results   Component Value Date    NHDL 100 08/24/2017       Is the patient on a Statin? YES             Is the patient on Aspirin? NO    Medications     HMG CoA Reductase Inhibitors    atorvastatin (LIPITOR) 20 MG tablet          Last three blood pressure readings:  BP Readings from Last 3 Encounters:   08/25/17 92/59   07/10/17 102/62   08/10/16 120/70       Date of last diabetes office visit: 8/25/2017     Tobacco History:     History   Smoking Status     Current Every Day Smoker     Packs/day: 0.50   Smokeless Tobacco     Never Used           IVD   ASA: MONITOR    Last LDL:    Lab Results   Component Value Date    CHOL 123 08/24/2017     Lab Results   Component Value Date    HDL 23 08/24/2017     Lab Results   Component Value Date    LDL 66 08/24/2017     Lab Results   Component Value Date    TRIG 171 08/24/2017        Lab Results   Component Value Date    CHOLHDLRATIO 5.5 10/08/2015        Is the patient on a Statin? YES   Is the patient on Aspirin? NO                  Medications     HMG CoA Reductase Inhibitors    atorvastatin (LIPITOR) 20 MG tablet          Last three blood pressure readings:  BP Readings from Last 3 Encounters:   08/25/17 92/59   07/10/17 102/62   08/10/16 120/70        Tobacco History:     History   Smoking Status     Current Every Day Smoker     Packs/day: 0.50   Smokeless  Tobacco     Never Used             Composite cancer screening  Chart review shows that this patient is due/due soon for the following Colonoscopy  Summary:    Patient is due/failing the following:   A1C and COLONOSCOPY    Action needed:   Patient needs office visit for diabetic follow up. Colonoscopy needs to be ordered.    Type of outreach:    Patient has scheduled appointment 2/2/2018    Questions for provider review:    None                                                                                                                                    [facility]       Chart routed to none .

## 2017-12-11 DIAGNOSIS — E11.22 TYPE 2 DIABETES MELLITUS WITH STAGE 3 CHRONIC KIDNEY DISEASE (H): ICD-10-CM

## 2017-12-11 DIAGNOSIS — N18.30 TYPE 2 DIABETES MELLITUS WITH STAGE 3 CHRONIC KIDNEY DISEASE (H): ICD-10-CM

## 2017-12-14 ENCOUNTER — TELEPHONE (OUTPATIENT)
Dept: ANTICOAGULATION | Facility: CLINIC | Age: 62
End: 2017-12-14

## 2017-12-22 ENCOUNTER — TELEPHONE (OUTPATIENT)
Dept: ANTICOAGULATION | Facility: CLINIC | Age: 62
End: 2017-12-22

## 2017-12-22 NOTE — TELEPHONE ENCOUNTER
Pt no showed his INR appointment yesterday.  Sent letter.  Also added patient to INR schedule on 2/2/18 when he is already coming in to see MD.  Kaitlin Lassiter RN

## 2018-01-13 ENCOUNTER — TELEPHONE (OUTPATIENT)
Dept: INTERNAL MEDICINE | Facility: CLINIC | Age: 63
End: 2018-01-13

## 2018-01-13 DIAGNOSIS — N18.30 TYPE 2 DIABETES MELLITUS WITH STAGE 3 CHRONIC KIDNEY DISEASE, WITHOUT LONG-TERM CURRENT USE OF INSULIN (H): Primary | ICD-10-CM

## 2018-01-13 DIAGNOSIS — E11.22 TYPE 2 DIABETES MELLITUS WITH STAGE 3 CHRONIC KIDNEY DISEASE, WITHOUT LONG-TERM CURRENT USE OF INSULIN (H): Primary | ICD-10-CM

## 2018-01-13 NOTE — TELEPHONE ENCOUNTER
Patient's plan has changed formulary.  One touch meter and diabetic supplies are now covered.  Please send new prescriptions.  Thank you!    Karin Ness - Pharmacy Tech  [facility]  [phone]

## 2018-01-15 DIAGNOSIS — D73.5 SPLENIC INFARCTION: ICD-10-CM

## 2018-01-15 RX ORDER — WARFARIN SODIUM 7.5 MG/1
TABLET ORAL
Qty: 60 TABLET | Refills: 0 | Status: SHIPPED | OUTPATIENT
Start: 2018-01-15 | End: 2018-05-09

## 2018-01-15 RX ORDER — BLOOD-GLUCOSE METER
EACH MISCELLANEOUS
Qty: 1 KIT | Refills: 0 | Status: SHIPPED | OUTPATIENT
Start: 2018-01-15 | End: 2018-03-01

## 2018-01-15 NOTE — TELEPHONE ENCOUNTER
Last office visit: 8/25/17  Next 5 appointments (look out 90 days)     Feb 02, 2018 10:00 AM CST   [facility]    [address]  [facility] [address] [phone]                 New prescriptions sent for One Touch Ultra 2 meter and supplies.

## 2018-01-15 NOTE — TELEPHONE ENCOUNTER
Warfarin  Last o/v 8/25/17    Lab Results   Component Value Date    INR 2.7 (A) 08/25/2017    INR 2.7 (A) 05/17/2017    INR 2.7 (A) 03/01/2017    INR 2.4 (A) 11/02/2016    INR 3.2 (A) 08/10/2016     Next 5 appointments (look out 90 days)     Feb 02, 2018 10:00 AM CST   SHORT with Ugo Delgado MD   [facility]    [address]  [facility] [id number] [phone]                INR visit also scheduled on 2/2/18.  Pt is monitored by INR Clinic.  Prescription approved per [facility] Refill Protocol.  Kaitlin Lassiter RN

## 2018-02-07 ENCOUNTER — TELEPHONE (OUTPATIENT)
Dept: ANTICOAGULATION | Facility: CLINIC | Age: 63
End: 2018-02-07

## 2018-02-07 NOTE — TELEPHONE ENCOUNTER
Spoke with patient and reminded him that he is overdue for an INR Check.  Assisted to schedule visit for 2/16 since this is the soonest that would work with his schedule.  Kaitlin Lassiter RN

## 2018-03-01 DIAGNOSIS — N18.30 TYPE 2 DIABETES MELLITUS WITH STAGE 3 CHRONIC KIDNEY DISEASE, WITHOUT LONG-TERM CURRENT USE OF INSULIN (H): ICD-10-CM

## 2018-03-01 DIAGNOSIS — E11.22 TYPE 2 DIABETES MELLITUS WITH STAGE 3 CHRONIC KIDNEY DISEASE, WITHOUT LONG-TERM CURRENT USE OF INSULIN (H): ICD-10-CM

## 2018-03-02 RX ORDER — BLOOD-GLUCOSE METER
EACH MISCELLANEOUS
Qty: 1 KIT | Refills: 0 | Status: SHIPPED | OUTPATIENT
Start: 2018-03-02

## 2018-03-02 RX ORDER — LIRAGLUTIDE 6 MG/ML
INJECTION SUBCUTANEOUS
Qty: 6 ML | Refills: 0 | Status: SHIPPED | OUTPATIENT
Start: 2018-03-02 | End: 2018-04-11

## 2018-03-02 NOTE — TELEPHONE ENCOUNTER
"Pt is due for 6 month diabetes follow up. Refilled for only 2 pens with note to pharmacy to inform patient to schedule an appointment.    Kemi Sagastume, RN        Requested Prescriptions   Pending Prescriptions Disp Refills     VICTOZA PEN 18 MG/3ML soln [Pharmacy Med Name: VICTOZA 18MG/3ML SOPN] 6 mL 0     Sig: INJECT 1.2 MG UNDER THE SKIN DAILY    GLP-1 Agonists Protocol Failed    3/1/2018  4:43 PM       Failed - Blood pressure less than 140/90 in past 6 months    BP Readings from Last 3 Encounters:   08/25/17 92/59   07/10/17 102/62   08/10/16 120/70                Failed - Microalbumin on file in past 12 months    Recent Labs   Lab Test  08/10/16   1122   MICROL  28   UMALCR  12.97            Failed - HgbA1C in past 3 or 6 months    Recent Labs   Lab Test  08/24/17   0943   A1C  6.0            Failed - Normal serum creatinine on file in past 12 months    Recent Labs   Lab Test  08/24/17   0943   CR  1.26*            Failed - Recent (6 mo) or future (30 days) visit within the authorizing provider's specialty    Patient had office visit in the last 6 months or has a visit in the next 30 days with authorizing provider.  See \"Patient Info\" tab in inbasket, or \"Choose Columns\" in Meds & Orders section of the refill encounter.           Passed - LDL on file in past 12 months    Recent Labs   Lab Test  08/24/17   0943   LDL  66            Passed - Patient is age 18 or older        blood glucose monitoring (ONE TOUCH ULTRA 2) meter device kit [Pharmacy Med Name: ONETOUCH ULTRA 2 W/DEVICE KIT]  0     Sig: USE TO TEST BLOOD SUGAR ONCE DAILY    Diabetic Supplies Protocol Failed    3/1/2018  4:43 PM       Failed - Recent (6 mo) or future (30 days) visit within the authorizing provider's specialty    Patient had office visit in the last 6 months or has a visit in the next 30 days with authorizing provider.  See \"Patient Info\" tab in inbasket, or \"Choose Columns\" in Meds & Orders section of the refill encounter.           " Passed - Patient is 18 years of age or older

## 2018-04-02 ENCOUNTER — TELEPHONE (OUTPATIENT)
Dept: INTERNAL MEDICINE | Facility: CLINIC | Age: 63
End: 2018-04-02

## 2018-04-02 NOTE — LETTER
[facility]  [address], [address]  [place], [place]  [address]                                          TEL:[phone]  FAX:[phone]      Osvaldo Parry  [address] [address]      April 2, 2018    Dear Osvaldo,         At [facility] we care about your health and well-being.  A review of your chart has indicated that you are due for a 6 month follow up with Dr. Delgado.  Please contact us at [phone] to schedule an appointment.    If you have already had one or all of the above screening tests at another facility, please call us to update your chart.       Sincerely,      Ugo Delgado M.D.

## 2018-04-02 NOTE — TELEPHONE ENCOUNTER
Panel Management Review      Patient has the following on his problem list:     Diabetes    ASA: monitor    Last A1C  Lab Results   Component Value Date    A1C 6.0 08/24/2017    A1C 6.1 08/10/2016    A1C 5.9 10/08/2015    A1C 6.0 03/24/2015    A1C 5.7 09/19/2014     A1C tested: Passed    Last LDL:    Lab Results   Component Value Date    CHOL 123 08/24/2017     Lab Results   Component Value Date    HDL 23 08/24/2017     Lab Results   Component Value Date    LDL 66 08/24/2017     Lab Results   Component Value Date    TRIG 171 08/24/2017     Lab Results   Component Value Date    CHOLHDLRATIO 5.5 10/08/2015     Lab Results   Component Value Date    NHDL 100 08/24/2017       Is the patient on a Statin? YES             Is the patient on Aspirin? NO    Medications     HMG CoA Reductase Inhibitors    atorvastatin (LIPITOR) 20 MG tablet          Last three blood pressure readings:  BP Readings from Last 3 Encounters:   08/25/17 92/59   07/10/17 102/62   08/10/16 120/70       Date of last diabetes office visit: 8/25/2017     Tobacco History:     History   Smoking Status     Current Every Day Smoker     Packs/day: 0.50   Smokeless Tobacco     Never Used           IVD   ASA: MONITOR    Last LDL:    Lab Results   Component Value Date    CHOL 123 08/24/2017     Lab Results   Component Value Date    HDL 23 08/24/2017     Lab Results   Component Value Date    LDL 66 08/24/2017     Lab Results   Component Value Date    TRIG 171 08/24/2017        Lab Results   Component Value Date    CHOLHDLRATIO 5.5 10/08/2015        Is the patient on a Statin? YES   Is the patient on Aspirin? NO                  Medications     HMG CoA Reductase Inhibitors    atorvastatin (LIPITOR) 20 MG tablet          Last three blood pressure readings:  BP Readings from Last 3 Encounters:   08/25/17 92/59   07/10/17 102/62   08/10/16 120/70        Tobacco History:     History   Smoking Status     Current Every Day Smoker     Packs/day: 0.50   Smokeless  Tobacco     Never Used       Hypertension   Last three blood pressure readings:  BP Readings from Last 3 Encounters:   08/25/17 92/59   07/10/17 102/62   08/10/16 120/70     Blood pressure: Passed    HTN Guidelines:  Age 18-59 BP range:  Less than 140/90  Age 60-85 with Diabetes:  Less than 140/90  Age 60-85 without Diabetes:  less than 150/90      Composite cancer screening  Chart review shows that this patient is due/due soon for the following Fecal Colorectal (FIT)  Summary:    Patient is due/failing the following:   A1C, BP CHECK, FOLLOW UP and FIT    Action needed:   FIT test and 6 month follow up with PCP    Type of outreach:    Phone, left message for patient to call back.  and Sent letter.    Questions for provider review:    None                                                                                                                                    Cristal TEAGUE       Chart routed to none .

## 2018-04-11 DIAGNOSIS — N18.30 TYPE 2 DIABETES MELLITUS WITH STAGE 3 CHRONIC KIDNEY DISEASE, WITHOUT LONG-TERM CURRENT USE OF INSULIN (H): ICD-10-CM

## 2018-04-11 DIAGNOSIS — E11.9 TYPE 2 DIABETES MELLITUS TREATED WITHOUT INSULIN (H): ICD-10-CM

## 2018-04-11 DIAGNOSIS — E78.5 HYPERLIPIDEMIA LDL GOAL <100: ICD-10-CM

## 2018-04-11 DIAGNOSIS — E11.22 TYPE 2 DIABETES MELLITUS WITH STAGE 3 CHRONIC KIDNEY DISEASE, WITHOUT LONG-TERM CURRENT USE OF INSULIN (H): ICD-10-CM

## 2018-04-11 RX ORDER — ATORVASTATIN CALCIUM 20 MG/1
TABLET, FILM COATED ORAL
Qty: 90 TABLET | Refills: 0 | Status: SHIPPED | OUTPATIENT
Start: 2018-04-11 | End: 2018-06-29

## 2018-04-11 RX ORDER — LIRAGLUTIDE 6 MG/ML
INJECTION SUBCUTANEOUS
Qty: 6 ML | Refills: 0 | Status: SHIPPED | OUTPATIENT
Start: 2018-04-11 | End: 2018-05-08

## 2018-04-11 RX ORDER — LANCETS 33 GAUGE
EACH MISCELLANEOUS
Qty: 100 EACH | Refills: 0 | Status: SHIPPED | OUTPATIENT
Start: 2018-04-11 | End: 2018-07-18

## 2018-04-20 ENCOUNTER — TELEPHONE (OUTPATIENT)
Dept: INTERNAL MEDICINE | Facility: CLINIC | Age: 63
End: 2018-04-20

## 2018-04-20 DIAGNOSIS — K04.7 DENTAL INFECTION: Primary | ICD-10-CM

## 2018-04-20 DIAGNOSIS — K04.7 ABSCESSED TOOTH: ICD-10-CM

## 2018-04-20 RX ORDER — PENICILLIN V POTASSIUM 500 MG/1
500 TABLET, FILM COATED ORAL 4 TIMES DAILY
Qty: 40 TABLET | Refills: 0 | Status: SHIPPED | OUTPATIENT
Start: 2018-04-20 | End: 2019-09-12

## 2018-04-20 NOTE — TELEPHONE ENCOUNTER
Pt calls, states he has history of abscesses in his mouth. He developed symptoms of another abscess last night and is not able to get an appt with the dentist right away and does not want to go to the ER for this. In the past Dr. Delgado has given him antibiotic and pain medication for this. Pt asks if he can do this again. If pain medication is ordered, pt would like to use [facility].    Pt advised he would need to be seen for this, recommended office visit. Pt declines, requesting message be sent to provider to review.

## 2018-04-23 RX ORDER — OXYCODONE AND ACETAMINOPHEN 5; 325 MG/1; MG/1
1-2 TABLET ORAL DAILY PRN
Qty: 10 TABLET | Refills: 0 | Status: SHIPPED | OUTPATIENT
Start: 2018-04-23 | End: 2020-01-22

## 2018-04-23 RX ORDER — HYDROCODONE BITARTRATE AND ACETAMINOPHEN 5; 325 MG/1; MG/1
1-2 TABLET ORAL DAILY PRN
Qty: 10 TABLET | Refills: 0 | Status: SHIPPED | OUTPATIENT
Start: 2018-04-23 | End: 2019-09-12

## 2018-04-23 NOTE — TELEPHONE ENCOUNTER
Pt calls back, states he got the message relying antibx sent to [facility] today. Asking for pain medication to help with 9/10 tooth pain. Indicates he's on an emergency stand by list at his dentist's office to get in as soon as possible. Reports the pain is difficult to tolerate and is asking that PCP would help. He works as an  so would only plan on taking in evening when not at work.    Please advise, thanks.

## 2018-04-23 NOTE — TELEPHONE ENCOUNTER
Will make available an Rx for #10 Percocet tabs.   Please bring written Rx to [facility].   Please advise pt.

## 2018-04-23 NOTE — TELEPHONE ENCOUNTER
Left voice message for pt that Dr. Delgado approved prescription for 10 tabs of Percocet and took this to [facility]. Advised pt to call them if he wanted this tonight, but they may not be able to fill this until tomorrow. Phone number provided for pharmacy.

## 2018-04-27 ENCOUNTER — TELEPHONE (OUTPATIENT)
Dept: ANTICOAGULATION | Facility: CLINIC | Age: 63
End: 2018-04-27

## 2018-05-09 DIAGNOSIS — D73.5 SPLENIC INFARCTION: ICD-10-CM

## 2018-05-09 NOTE — TELEPHONE ENCOUNTER
warfarin    Lab Results   Component Value Date    INR 2.7 (A) 08/25/2017    INR 2.7 (A) 05/17/2017    INR 2.7 (A) 03/01/2017    INR 2.4 (A) 11/02/2016    INR 3.2 (A) 08/10/2016     Last office visit and INR check was 8/25/17.  Pt has been reminded on several occasions by INR clinic to schedule a visit (letters and several calls).  He will schedule appointments, but then no show them.  Warfarin was last filled on 4/11/18.  No future INR checks have been scheduled at this time.    Next 5 appointments (look out 90 days)     Mat 15, 2018  2:00 PM CDT   SHORT with Ugo Delgado MD   [facility] ([facility])    [address]  [facility] [address]   [phone]                Routing refill request to provider for review/approval because:  Labs not current:  INR  Kaitlin Lassiter RN

## 2018-05-10 RX ORDER — WARFARIN SODIUM 7.5 MG/1
TABLET ORAL
Qty: 30 TABLET | Refills: 0 | Status: SHIPPED | OUTPATIENT
Start: 2018-05-10 | End: 2018-06-18

## 2018-05-23 ENCOUNTER — HOSPITAL ENCOUNTER (EMERGENCY)
Facility: CLINIC | Age: 63
Discharge: HOME | End: 2018-05-24
Attending: EMERGENCY MEDICINE | Admitting: EMERGENCY MEDICINE
Payer: COMMERCIAL

## 2018-05-23 ENCOUNTER — APPOINTMENT (OUTPATIENT)
Dept: ULTRASOUND IMAGING | Facility: CLINIC | Age: 63
End: 2018-05-23
Attending: EMERGENCY MEDICINE
Payer: COMMERCIAL

## 2018-05-23 DIAGNOSIS — N45.1 EPIDIDYMITIS: ICD-10-CM

## 2018-05-23 LAB
ALBUMIN UR-MCNC: 30 MG/DL
ANION GAP SERPL CALCULATED.3IONS-SCNC: 10 MMOL/L (ref 3–14)
APPEARANCE UR: ABNORMAL
BACTERIA #/AREA URNS HPF: ABNORMAL /HPF
BASOPHILS # BLD AUTO: 0 10E9/L (ref 0–0.2)
BASOPHILS NFR BLD AUTO: 0.2 %
BILIRUB UR QL STRIP: NEGATIVE
BUN SERPL-MCNC: 26 MG/DL (ref 7–30)
CALCIUM SERPL-MCNC: 8.6 MG/DL (ref 8.5–10.1)
CHLORIDE SERPL-SCNC: 102 MMOL/L (ref 94–109)
CO2 SERPL-SCNC: 25 MMOL/L (ref 20–32)
COLOR UR AUTO: YELLOW
CREAT SERPL-MCNC: 1.73 MG/DL (ref 0.66–1.25)
DIFFERENTIAL METHOD BLD: ABNORMAL
EOSINOPHIL # BLD AUTO: 0 10E9/L (ref 0–0.7)
EOSINOPHIL NFR BLD AUTO: 0.2 %
ERYTHROCYTE [DISTWIDTH] IN BLOOD BY AUTOMATED COUNT: 13.2 % (ref 10–15)
GFR SERPL CREATININE-BSD FRML MDRD: 40 ML/MIN/1.7M2
GLUCOSE SERPL-MCNC: 161 MG/DL (ref 70–99)
GLUCOSE UR STRIP-MCNC: NEGATIVE MG/DL
HCT VFR BLD AUTO: 35.6 % (ref 40–53)
HGB BLD-MCNC: 12.4 G/DL (ref 13.3–17.7)
HGB UR QL STRIP: ABNORMAL
IMM GRANULOCYTES # BLD: 0 10E9/L (ref 0–0.4)
IMM GRANULOCYTES NFR BLD: 0.3 %
KETONES UR STRIP-MCNC: NEGATIVE MG/DL
LEUKOCYTE ESTERASE UR QL STRIP: ABNORMAL
LYMPHOCYTES # BLD AUTO: 1.1 10E9/L (ref 0.8–5.3)
LYMPHOCYTES NFR BLD AUTO: 7.3 %
MCH RBC QN AUTO: 31.9 PG (ref 26.5–33)
MCHC RBC AUTO-ENTMCNC: 34.8 G/DL (ref 31.5–36.5)
MCV RBC AUTO: 92 FL (ref 78–100)
MONOCYTES # BLD AUTO: 0.9 10E9/L (ref 0–1.3)
MONOCYTES NFR BLD AUTO: 6.5 %
MUCOUS THREADS #/AREA URNS LPF: PRESENT /LPF
NEUTROPHILS # BLD AUTO: 12.3 10E9/L (ref 1.6–8.3)
NEUTROPHILS NFR BLD AUTO: 85.5 %
NITRATE UR QL: POSITIVE
NRBC # BLD AUTO: 0 10*3/UL
NRBC BLD AUTO-RTO: 0 /100
PH UR STRIP: 5.5 PH (ref 5–7)
PLATELET # BLD AUTO: 234 10E9/L (ref 150–450)
POTASSIUM SERPL-SCNC: 3.6 MMOL/L (ref 3.4–5.3)
RBC # BLD AUTO: 3.89 10E12/L (ref 4.4–5.9)
RBC #/AREA URNS AUTO: 4 /HPF (ref 0–2)
SODIUM SERPL-SCNC: 137 MMOL/L (ref 133–144)
SOURCE: ABNORMAL
SP GR UR STRIP: 1.02 (ref 1–1.03)
SQUAMOUS #/AREA URNS AUTO: 2 /HPF (ref 0–1)
UROBILINOGEN UR STRIP-MCNC: NORMAL MG/DL (ref 0–2)
WBC # BLD AUTO: 14.4 10E9/L (ref 4–11)
WBC #/AREA URNS AUTO: 96 /HPF (ref 0–5)

## 2018-05-23 PROCEDURE — 96374 THER/PROPH/DIAG INJ IV PUSH: CPT

## 2018-05-23 PROCEDURE — 25000128 H RX IP 250 OP 636: Performed by: EMERGENCY MEDICINE

## 2018-05-23 PROCEDURE — 25000132 ZZH RX MED GY IP 250 OP 250 PS 637: Performed by: EMERGENCY MEDICINE

## 2018-05-23 PROCEDURE — 99285 EMERGENCY DEPT VISIT HI MDM: CPT | Mod: 25

## 2018-05-23 PROCEDURE — 76870 US EXAM SCROTUM: CPT

## 2018-05-23 PROCEDURE — 85025 COMPLETE CBC W/AUTO DIFF WBC: CPT | Performed by: EMERGENCY MEDICINE

## 2018-05-23 PROCEDURE — 85610 PROTHROMBIN TIME: CPT | Performed by: EMERGENCY MEDICINE

## 2018-05-23 PROCEDURE — 81001 URINALYSIS AUTO W/SCOPE: CPT | Performed by: EMERGENCY MEDICINE

## 2018-05-23 PROCEDURE — 80048 BASIC METABOLIC PNL TOTAL CA: CPT | Performed by: EMERGENCY MEDICINE

## 2018-05-23 RX ORDER — LEVOFLOXACIN 500 MG/1
500 TABLET, FILM COATED ORAL ONCE
Status: COMPLETED | OUTPATIENT
Start: 2018-05-23 | End: 2018-05-23

## 2018-05-23 RX ORDER — LEVOFLOXACIN 500 MG/1
500 TABLET, FILM COATED ORAL DAILY
Qty: 10 TABLET | Refills: 0 | Status: SHIPPED | OUTPATIENT
Start: 2018-05-23 | End: 2018-06-02

## 2018-05-23 RX ORDER — HYDROMORPHONE HYDROCHLORIDE 1 MG/ML
0.5 INJECTION, SOLUTION INTRAMUSCULAR; INTRAVENOUS; SUBCUTANEOUS ONCE
Status: COMPLETED | OUTPATIENT
Start: 2018-05-23 | End: 2018-05-23

## 2018-05-23 RX ORDER — OXYCODONE AND ACETAMINOPHEN 5; 325 MG/1; MG/1
1-2 TABLET ORAL EVERY 4 HOURS PRN
Qty: 12 TABLET | Refills: 0 | Status: SHIPPED | OUTPATIENT
Start: 2018-05-23 | End: 2020-01-22

## 2018-05-23 RX ADMIN — HYDROMORPHONE HYDROCHLORIDE 0.5 MG: 1 INJECTION, SOLUTION INTRAMUSCULAR; INTRAVENOUS; SUBCUTANEOUS at 20:31

## 2018-05-23 RX ADMIN — LEVOFLOXACIN 500 MG: 500 TABLET, FILM COATED ORAL at 23:37

## 2018-05-23 ASSESSMENT — ENCOUNTER SYMPTOMS
HEMATURIA: 0
ABDOMINAL PAIN: 1
HEADACHES: 0
DYSURIA: 0
FREQUENCY: 0
NAUSEA: 1

## 2018-05-23 NOTE — ED AVS SNAPSHOT
Emergency Department    [address] [place] [id number]    Phone:  [phone]    Fax:  [phone]                                       Osvaldo Parry   MRN: [id number]    Department:   Emergency Department   Date of Visit:  5/23/2018           After Visit Summary Signature Page     I have received my discharge instructions, and my questions have been answered. I have discussed any challenges I see with this plan with the nurse or doctor.    ..........................................................................................................................................  Patient/Patient Representative Signature      ..........................................................................................................................................  Patient Representative Print Name and Relationship to Patient    ..................................................               ................................................  Date                                            Time    ..........................................................................................................................................  Reviewed by Signature/Title    ...................................................              ..............................................  Date                                                            Time

## 2018-05-23 NOTE — ED AVS SNAPSHOT
Emergency Department    [address] [facility] [address]    Phone:  [phone]    Fax:  [phone]                                       Osvaldo Parry   MRN: [id number]    Department:   Emergency Department   Date of Visit:  5/23/2018           Patient Information     Date Of Birth          1955        Your diagnoses for this visit were:     Epididymitis        You were seen by Kerrie Maria MD.      Follow-up Information     Follow up with Ugo Delgado MD.    Specialty:  Internal Medicine    Why:  As needed, If symptoms worsen    Contact information:    [address]  [facility] [address] [phone]          Follow up with [facility].    Why:  As needed, If symptoms worsen    Contact information:    [address] [facility] [address]  [facility] [address] [phone]          Follow up with  Emergency Department.    Specialty:  EMERGENCY MEDICINE    Why:  As needed    Contact information:    [address] [facility] [address] [phone]        Follow up with Make sure you have your INR checked in 4-5 days as it may be abnormally elevated with the antibiotic that you are taking.        Discharge Instructions         Discharge Instructions for Epididymitis  You have been diagnosed with epididymitis. This is an inflammation of a coiled tube called the epididymis that is located behind your testicle, inside the scrotum. The epididymis collects and stores sperm made by the testicles. Epididymitis is often caused by bacteria in the urinary tract or by bacteria passed between partners during sex. It can also be a complication of certain hospital procedures, or it can be caused by use of a urinary catheter. Here s what you need to do at home to care for yourself.  Home care    Be sure to finish all the medicine your healthcare provider prescribed -- even if you feel better. Not finishing the medicine can make your condition harder to treat  or cause the condition to come back.    Rest in bed if you are uncomfortable. Discomfort should go away within 1 to 3 days of treatment. Swelling may persist for up to 2 months.    Ask your healthcare provider about pain medicine to keep you comfortable.    Use an ice pack or bag of frozen peas to help relieve the pain. Wrap the peas or ice pack in a thin cloth and apply to the area.    Don t leave the ice pack on your skin for longer than 20 minutes, and don t use it more often than once every hour.    Elevate the scrotum with a rolled-up towel when you are resting.    For the first few days, wear an athletic supporter. When your pain subsides, wear briefs instead of boxers to better support the scrotum. This can help relieve pain.    Keep your penis and scrotum clean.    Use a condom to protect against sexually transmitted diseases (STDs).    If your condition was caused by an STD, be sure to inform your sexual partner(s).  Follow-up care  Make a follow-up appointment or as directed by your healthcare provider.  When to call your healthcare provider  Call your healthcare provider right away if you have any of the following:    Increased pain or swelling in the scrotum    Frequent urge or inability to urinate    Discharge from the penis    Pain during ejaculation    Fever above 100.4 F (38 C)   Date Last Reviewed: 1/1/2017 2000-2017 [employer] [address] [place], [place] [address]. All rights reserved. This information is not intended as a substitute for professional medical care. Always follow your healthcare professional's instructions.    Opioid Medication Information    You have been given a prescription for an opioid (narcotic) pain medicine and/or have received a pain medicine while here in the Emergency Department. These medicines can make you drowsy or impaired. You must not drive, operate dangerous equipment, or engage in any other dangerous activities while taking these medications.  If you drive while taking these medications, you could be arrested for DUI, or driving under the influence. Do not drink any alcohol while you are taking these medications.   Opioid pain medications can cause addiction. If you have a history of chemical dependency of any type, you are at a higher risk of becoming addicted to pain medications.  Only take these prescribed medications to treat your pain when all other options have been tried. Take it for as short a time and as few doses as possible. Store your pain pills in a secure place, as they are frequently stolen and provide a dangerous opportunity for children or visitors in your house to start abusing these powerful medications. We will not replace any lost or stolen medicine.  As soon as your pain is better, you should flush all your remaining medication.   Many prescription pain medications contain Tylenol  (acetaminophen), including Vicodin , Tylenol #3 , Norco , Lortab , and Percocet .  You should not take any extra pills of Tylenol  if you are using these prescription medications or you can get very sick.  Do not ever take more than 4000 mg of acetaminophen in any 24 hour period.  All opioids tend to cause constipation. Drink plenty of water and eat foods that have a lot of fiber, such as fruits, vegetables, prune juice, apple juice and high fiber cereal.  Take a laxative if you don t move your bowels at least every other day. Miralax , Milk of Magnesia, Colace , or Senna  can be used to keep you regular.              Your next 10 appointments already scheduled     Jun 11, 2018  9:30 AM CDT   [facility]    [address]  [facility] [id number] [phone]           Please do not eat 10-12 hours before your appointment if you are coming in fasting for labs on lipids, cholesterol, or glucose (sugar). This does not apply to pregnant women. Water, hot tea and black coffee (with  nothing added) are okay. Do not drink other fluids, diet soda or chew gum.            Mat 15, 2018  2:00 PM CDT   SHORT with Ugo Delgado MD   [facility]    [address]  [facility] [address]   [phone]              24 Hour Appointment Hotline       To make an appointment at any [facility], call [phone] ([phone]). If you don't have a family doctor or clinic, we will help you find one. [facility] are conveniently located to serve the needs of you and your family.             Review of your medicines      START taking        Dose / Directions Last dose taken    levofloxacin 500 MG tablet   Commonly known as:  LEVAQUIN   Dose:  500 mg   Quantity:  10 tablet        Take 1 tablet (500 mg) by mouth daily for 10 days   Refills:  0          Our records show that you are taking the medicines listed below. If these are incorrect, please call your family doctor or clinic.        Dose / Directions Last dose taken    albuterol (2.5 MG/3ML) 0.083% neb solution   Dose:  1 vial   Quantity:  25 vial        Take 1 vial (2.5 mg) by nebulization every 6 hours as needed for shortness of breath / dyspnea or wheezing   Refills:  1        atorvastatin 20 MG tablet   Commonly known as:  LIPITOR   Quantity:  90 tablet        TAKE ONE TABLET BY MOUTH EVERY DAY   Refills:  0        B-D U/F 31G X 8 MM   Quantity:  100 each   Generic drug:  insulin pen needle        USE ONCE DAILY OR AS DIRECTED   Refills:  1        blood glucose monitoring meter device kit   Quantity:  1 kit        USE TO TEST BLOOD SUGAR ONCE DAILY   Refills:  0        blood glucose monitoring test strip   Commonly known as:  ONETOUCH ULTRA   Quantity:  100 strip        Use to test blood sugar 1 times daily.   Refills:  1        HYDROcodone-acetaminophen 5-325 MG per tablet   Commonly known as:  NORCO   Dose:  1-2 tablet   Quantity:  10 tablet        Take 1-2 tablets by mouth daily as  needed for severe pain   Refills:  0        lisinopril 2.5 MG tablet   Commonly known as:  PRINIVIL/Zestril   Dose:  2.5 mg   Quantity:  90 tablet        Take 1 tablet (2.5 mg) by mouth daily   Refills:  3        metFORMIN 500 MG tablet   Commonly known as:  GLUCOPHAGE   Quantity:  180 tablet        TAKE ONE TABLET BY MOUTH TWICE A DAY WITH MEALS   Refills:  0        metoprolol succinate 25 MG 24 hr tablet   Commonly known as:  TOPROL-XL   Dose:  25 mg   Quantity:  90 tablet        Take 1 tablet (25 mg) by mouth daily   Refills:  3        niacin 500 MG CR tablet   Commonly known as:  NIASPAN   Dose:  500 mg   Quantity:  90 tablet        Take 1 tablet (500 mg) by mouth At Bedtime   Refills:  3        nicotine polacrilex 2 MG gum   Commonly known as:  NICORETTE   Dose:  2 mg   Quantity:  30 tablet        Place 1 each (2 mg) inside cheek as needed for smoking cessation   Refills:  11        ONETOUCH DELICA LANCETS 33G Misc   Quantity:  100 each        USE TO TEST BLOOD SUGAR ONCE DAILY   Refills:  0        order for DME   Quantity:  1 Units        Equipment being ordered: Nebulizer machine for home use   Refills:  0        penicillin V potassium 500 MG tablet   Commonly known as:  VEETID   Dose:  500 mg   Quantity:  40 tablet        Take 1 tablet (500 mg) by mouth 4 times daily   Refills:  0        sildenafil 100 MG tablet   Commonly known as:  VIAGRA   Dose:   mg   Quantity:  6 tablet        Take 0.5-1 tablets ( mg) by mouth daily as needed for erectile dysfunction Take 30 min to 4 hours before intercourse.   Refills:  2        * varenicline 0.5 MG X 11 & 1 MG X 42 tablet   Commonly known as:  CHANTIX STARTING MONTH ANTONIO   Quantity:  53 tablet        Take 0.5 mg tab daily for 3 days, then 0.5 mg tab twice daily for 4 days, then 1 mg twice daily.   Refills:  0        * varenicline 1 MG tablet   Commonly known as:  CHANTIX CONTINUING MONTH ANTONIO   Dose:  1 mg   Quantity:  60 tablet        Take 1 tablet (1 mg)  by mouth 2 times daily   Refills:  4        VICTOZA PEN 18 MG/3ML soln   Quantity:  6 mL   Generic drug:  liraglutide        INJECT 1.2MG UNDER THE SKIN ONCE DAILY (NEED TO BE SEEN IN CLINIC FOR 6 MONTH DIABETES FOLLOW UP FOR FURTHER REFILLS)   Refills:  0        warfarin 7.5 MG tablet   Commonly known as:  COUMADIN   Quantity:  30 tablet        Take 1/2 tablet (3.75 mg) Tuesday, Thursday, Saturday and Sunday, and 1 tablet (7.5 mg) on Monday, Wednesday and Friday, or as instructed.   Refills:  0        * Notice:  This list has 2 medication(s) that are the same as other medications prescribed for you. Read the directions carefully, and ask your doctor or other care provider to review them with you.      ASK your doctor about these medications        Dose / Directions Last dose taken    * oxyCODONE-acetaminophen 5-325 MG per tablet   Commonly known as:  PERCOCET   Dose:  1-2 tablet   What changed:  Another medication with the same name was added. Make sure you understand how and when to take each.   Quantity:  10 tablet   Ask about: Which instructions should I use?        Take 1-2 tablets by mouth daily as needed for severe pain   Refills:  0        * oxyCODONE-acetaminophen 5-325 MG per tablet   Commonly known as:  PERCOCET   Dose:  1-2 tablet   What changed:  You were already taking a medication with the same name, and this prescription was added. Make sure you understand how and when to take each.   Quantity:  12 tablet   Ask about: Which instructions should I use?        Take 1-2 tablets by mouth every 4 hours as needed for pain   Refills:  0        * Notice:  This list has 2 medication(s) that are the same as other medications prescribed for you. Read the directions carefully, and ask your doctor or other care provider to review them with you.            Information about OPIOIDS     PRESCRIPTION OPIOIDS: WHAT YOU NEED TO KNOW   You have a prescription for an opioid (narcotic) pain medicine. Opioids can cause  addiction. If you have a history of chemical dependency of any type, you are at a higher risk of becoming addicted to opioids. Only take this medicine after all other options have been tried. Take it for as short a time and as few doses as possible.     Do not:    Drive. If you drive while taking these medicines, you could be arrested for driving under the influence (DUI).    Operate heavy machinery    Do any other dangerous activities while taking these medicines.     Drink any alcohol while taking these medicines.      Take with any other medicines that contain acetaminophen. Read all labels carefully. Look for the word  acetaminophen  or  Tylenol.  Ask your pharmacist if you have questions or are unsure.    Store your pills in a secure place, locked if possible. We will not replace any lost or stolen medicine. If you don t finish your medicine, please throw away (dispose) as directed by your pharmacist. The [place] [employer] has more information about safe disposal: https://www.pca.[email or web address].us/living-green/managing-unwanted-medications    All opioids tend to cause constipation. Drink plenty of water and eat foods that have a lot of fiber, such as fruits, vegetables, prune juice, apple juice and high-fiber cereal. Take a laxative (Miralax, milk of magnesia, Colace, Senna) if you don t move your bowels at least every other day.         Prescriptions were sent or printed at these locations (2 Prescriptions)                   Other Prescriptions                Printed at Department/Unit printer (2 of 2)         levofloxacin (LEVAQUIN) 500 MG tablet               oxyCODONE-acetaminophen (PERCOCET) 5-325 MG per tablet                Procedures and tests performed during your visit     Basic metabolic panel    CBC with platelets differential    INR    UA with Microscopic    US Testicular & Scrotum w Doppler Ltd      Orders Needing Specimen Collection     None      Pending Results     No orders found for  last 3 day(s).            Pending Culture Results     No orders found for last 3 day(s).            Pending Results Instructions     If you had any lab results that were not finalized at the time of your Discharge, you can call the ED Lab Result RN at [phone]. You will be contacted by this team for any positive Lab results or changes in treatment. The nurses are available 7 days a week from 10A to 6:30P.  You can leave a message 24 hours per day and they will return your call.        Test Results From Your Hospital Stay        5/23/2018 11:31 PM      Narrative     US TESTICULAR AND SCROTUM WITH DOPPLER LIMITED  5/23/2018 11:17 PM     HISTORY: Significant left testicular pain.     COMPARISON: None.    FINDINGS: The testicles are normal in size and texture. No testicular  mass. The right testicle measures 4.9 x 3.1 x 1.8 cm. The left  testicle measures 4.1 x 3.3 x 2.1 cm. Color Doppler and Doppler  waveform analysis of the testicles shows normal and symmetric blood  flow. The left epididymis is enlarged, heterogeneous, and has elevated  blood flow. The right epididymis appears normal. There is a small left  hydrocele. No varicocele on either side.        Impression     IMPRESSION:  1. Left epididymitis.  2. Normal testicles.    EARNEST VILCHIS MD         5/23/2018 11:48 PM      Component Results     Component Value Ref Range & Units Status    Color Urine Yellow  Final    Appearance Urine Slightly Cloudy  Final    Glucose Urine Negative NEG^Negative mg/dL Final    Bilirubin Urine Negative NEG^Negative Final    Ketones Urine Negative NEG^Negative mg/dL Final    Specific Gravity Urine 1.023 1.003 - 1.035 Final    Blood Urine Trace (A) NEG^Negative Final    pH Urine 5.5 5.0 - 7.0 pH Final    Protein Albumin Urine 30 (A) NEG^Negative mg/dL Final    Urobilinogen mg/dL Normal 0.0 - 2.0 mg/dL Final    Nitrite Urine Positive (A) NEG^Negative Final    Leukocyte Esterase Urine Large (A) NEG^Negative Final    Source  Midstream Urine  Final    WBC Urine 96 (H) 0 - 5 /HPF Final    RBC Urine 4 (H) 0 - 2 /HPF Final    Bacteria Urine Many (A) NEG^Negative /HPF Final    Squamous Epithelial /HPF Urine 2 (H) 0 - 1 /HPF Final    Mucous Urine Present (A) NEG^Negative /LPF Final         5/23/2018  8:48 PM      Component Results     Component Value Ref Range & Units Status    WBC 14.4 (H) 4.0 - 11.0 10e9/L Final    RBC Count 3.89 (L) 4.4 - 5.9 10e12/L Final    Hemoglobin 12.4 (L) 13.3 - 17.7 g/dL Final    Hematocrit 35.6 (L) 40.0 - 53.0 % Final    MCV 92 78 - 100 fl Final    MCH 31.9 26.5 - 33.0 pg Final    MCHC 34.8 31.5 - 36.5 g/dL Final    RDW 13.2 10.0 - 15.0 % Final    Platelet Count 234 150 - 450 10e9/L Final    Diff Method Automated Method  Final    % Neutrophils 85.5 % Final    % Lymphocytes 7.3 % Final    % Monocytes 6.5 % Final    % Eosinophils 0.2 % Final    % Basophils 0.2 % Final    % Immature Granulocytes 0.3 % Final    Nucleated RBCs 0 0 /100 Final    Absolute Neutrophil 12.3 (H) 1.6 - 8.3 10e9/L Final    Absolute Lymphocytes 1.1 0.8 - 5.3 10e9/L Final    Absolute Monocytes 0.9 0.0 - 1.3 10e9/L Final    Absolute Eosinophils 0.0 0.0 - 0.7 10e9/L Final    Absolute Basophils 0.0 0.0 - 0.2 10e9/L Final    Abs Immature Granulocytes 0.0 0 - 0.4 10e9/L Final    Absolute Nucleated RBC 0.0  Final         5/23/2018  9:02 PM      Component Results     Component Value Ref Range & Units Status    Sodium 137 133 - 144 mmol/L Final    Potassium 3.6 3.4 - 5.3 mmol/L Final    Chloride 102 94 - 109 mmol/L Final    Carbon Dioxide 25 20 - 32 mmol/L Final    Anion Gap 10 3 - 14 mmol/L Final    Glucose 161 (H) 70 - 99 mg/dL Final    Urea Nitrogen 26 7 - 30 mg/dL Final    Creatinine 1.73 (H) 0.66 - 1.25 mg/dL Final    GFR Estimate 40 (L) >60 mL/min/1.7m2 Final    Non  GFR Calc    GFR Estimate If Black 49 (L) >60 mL/min/1.7m2 Final    African American GFR Calc    Calcium 8.6 8.5 - 10.1 mg/dL Final         5/24/2018 12:18 AM       Component Results     Component Value Ref Range & Units Status    INR 2.97 (H) 0.86 - 1.14 Final                Clinical Quality Measure: Blood Pressure Screening     Your blood pressure was checked while you were in the emergency department today. The last reading we obtained was  BP: 112/63 . Please read the guidelines below about what these numbers mean and what you should do about them.  If your systolic blood pressure (the top number) is less than 120 and your diastolic blood pressure (the bottom number) is less than 80, then your blood pressure is normal. There is nothing more that you need to do about it.  If your systolic blood pressure (the top number) is 120-139 or your diastolic blood pressure (the bottom number) is 80-89, your blood pressure may be higher than it should be. You should have your blood pressure rechecked within a year by a primary care provider.  If your systolic blood pressure (the top number) is 140 or greater or your diastolic blood pressure (the bottom number) is 90 or greater, you may have high blood pressure. High blood pressure is treatable, but if left untreated over time it can put you at risk for heart attack, stroke, or kidney failure. You should have your blood pressure rechecked by a primary care provider within the next 4 weeks.  If your provider in the emergency department today gave you specific instructions to follow-up with your doctor or provider even sooner than that, you should follow that instruction and not wait for up to 4 weeks for your follow-up visit.        Thank you for choosing [facility]       Thank you for choosing [facility] for your care. Our goal is always to provide you with excellent care. Hearing back from our patients is one way we can continue to improve our services. Please take a few minutes to complete the written survey that you may receive in the mail after you visit with us. Thank you!        [employer] Information     [employer] lets you send messages  "to your doctor, view your test results, renew your prescriptions, schedule appointments and more. To sign up, go to [email or web address] . Click on \"Log in\" on the left side of the screen, which will take you to the Welcome page. Then click on \"Sign up Now\" on the right side of the page.     You will be asked to enter the access code listed below, as well as some personal information. Please follow the directions to create your username and password.     Your access code is: [email or web address]  Expires: 2018 12:26 AM     Your access code will  in 90 days. If you need help or a new code, please call your [facility] clinic or [phone].        Care EveryWhere ID     This is your Care EveryWhere ID. This could be used by other organizations to access your [facility] medical records  [id number]        Equal Access to Services     PERRY HARTMANN : Hadii parish Kline, waaxda lencho, qaybta kaalmada bess, cheryl bedolla . So [facility] [phone].    ATENCIÓN: Si habla español, tiene a cruz disposición servicios gratuitos de asistencia lingüística. Llame al [phone].    We comply with applicable federal civil rights laws and [place] laws. We do not discriminate on the basis of race, color, national origin, age, disability, sex, sexual orientation, or gender identity.            After Visit Summary       This is your record. Keep this with you and show to your community pharmacist(s) and doctor(s) at your next visit.                  "

## 2018-05-24 VITALS
BODY MASS INDEX: 26.95 KG/M2 | SYSTOLIC BLOOD PRESSURE: 112 MMHG | TEMPERATURE: 98.6 F | HEIGHT: 74 IN | HEART RATE: 88 BPM | OXYGEN SATURATION: 97 % | WEIGHT: 210 LBS | RESPIRATION RATE: 16 BRPM | DIASTOLIC BLOOD PRESSURE: 63 MMHG

## 2018-05-24 LAB — INR PPP: 2.97 (ref 0.86–1.14)

## 2018-05-24 NOTE — DISCHARGE INSTRUCTIONS
Discharge Instructions for Epididymitis  You have been diagnosed with epididymitis. This is an inflammation of a coiled tube called the epididymis that is located behind your testicle, inside the scrotum. The epididymis collects and stores sperm made by the testicles. Epididymitis is often caused by bacteria in the urinary tract or by bacteria passed between partners during sex. It can also be a complication of certain hospital procedures, or it can be caused by use of a urinary catheter. Here s what you need to do at home to care for yourself.  Home care    Be sure to finish all the medicine your healthcare provider prescribed -- even if you feel better. Not finishing the medicine can make your condition harder to treat or cause the condition to come back.    Rest in bed if you are uncomfortable. Discomfort should go away within 1 to 3 days of treatment. Swelling may persist for up to 2 months.    Ask your healthcare provider about pain medicine to keep you comfortable.    Use an ice pack or bag of frozen peas to help relieve the pain. Wrap the peas or ice pack in a thin cloth and apply to the area.    Don t leave the ice pack on your skin for longer than 20 minutes, and don t use it more often than once every hour.    Elevate the scrotum with a rolled-up towel when you are resting.    For the first few days, wear an athletic supporter. When your pain subsides, wear briefs instead of boxers to better support the scrotum. This can help relieve pain.    Keep your penis and scrotum clean.    Use a condom to protect against sexually transmitted diseases (STDs).    If your condition was caused by an STD, be sure to inform your sexual partner(s).  Follow-up care  Make a follow-up appointment or as directed by your healthcare provider.  When to call your healthcare provider  Call your healthcare provider right away if you have any of the following:    Increased pain or swelling in the scrotum    Frequent urge or  inability to urinate    Discharge from the penis    Pain during ejaculation    Fever above 100.4 F (38 C)   Date Last Reviewed: 1/1/2017 2000-2017 [employer]. [address] [place] [address], [place] [address]. All rights reserved. This information is not intended as a substitute for professional medical care. Always follow your healthcare professional's instructions.    Opioid Medication Information    You have been given a prescription for an opioid (narcotic) pain medicine and/or have received a pain medicine while here in the Emergency Department. These medicines can make you drowsy or impaired. You must not drive, operate dangerous equipment, or engage in any other dangerous activities while taking these medications. If you drive while taking these medications, you could be arrested for DUI, or driving under the influence. Do not drink any alcohol while you are taking these medications.   Opioid pain medications can cause addiction. If you have a history of chemical dependency of any type, you are at a higher risk of becoming addicted to pain medications.  Only take these prescribed medications to treat your pain when all other options have been tried. Take it for as short a time and as few doses as possible. Store your pain pills in a secure place, as they are frequently stolen and provide a dangerous opportunity for children or visitors in your house to start abusing these powerful medications. We will not replace any lost or stolen medicine.  As soon as your pain is better, you should flush all your remaining medication.   Many prescription pain medications contain Tylenol  (acetaminophen), including Vicodin , Tylenol #3 , Norco , Lortab , and Percocet .  You should not take any extra pills of Tylenol  if you are using these prescription medications or you can get very sick.  Do not ever take more than 4000 mg of acetaminophen in any 24 hour period.  All opioids tend to cause constipation. Drink  plenty of water and eat foods that have a lot of fiber, such as fruits, vegetables, prune juice, apple juice and high fiber cereal.  Take a laxative if you don t move your bowels at least every other day. Miralax , Milk of Magnesia, Colace , or Senna  can be used to keep you regular.

## 2018-05-24 NOTE — ED PROVIDER NOTES
"  History     Chief Complaint:  Groin Pain    HPI   Osvaldo Parry is a 62 year old male with a history of diabetes who presents with groin pain. The patient reports developing lower abdominal/pelvic pain yesterday but attributed this to something he ate. He woke at 0330 this morning and walked into the bathroom when he suddenly developed vertiginous symptoms with nausea, dry heaving, and difficulty ambulating. He fell to the floor but denies head trauma; however, the fall made his pain worse. He noted the pain to have moved down to his scrotal region today which causes \"severe spikes of shooting pain\" with ambulation. His symptoms are alleviated by laying flat/still. He notes having a small amount of steak for dinner tonight. He denies pain or burning with urination but does note decreased urine.     Allergies:  Codeine Sulfate  Hydrocodone      Medications:    Albuterol nebulizer  Lipitor  Lisinopril  Metformin  Metoprolol  Niacin  Viagra  Chantix  Victoza  Warfarin     Past Medical History:    CKD  Congenital anomaly of aorta  Factor 5 Leiden  Hyperlipidemia  Patent foramen ovale with right to left shunt  RBBB with left anterior fascicular block  Splenic infarction  Diabetes    Past Surgical History:    History reviewed. No pertinent surgical history.     Family History:    History review. No contributing family history.     Social History:  Smoking status: yes  Alcohol use: no   PCP: Ugo Delgado   Patient presents alone.   Marital Status:  Single      Review of Systems   Gastrointestinal: Positive for abdominal pain and nausea.   Genitourinary: Positive for decreased urine volume. Negative for dysuria, frequency and hematuria.        Positive for groin pain   Neurological: Negative for headaches.   All other systems reviewed and are negative.    Physical Exam     Patient Vitals for the past 24 hrs:   BP Temp Temp src Pulse Resp SpO2 Height Weight   05/23/18 1950 109/55 98.6  F (37  C) Oral 88 16 97 % 1.88 " "m (6' 2\") 95.3 kg (210 lb)     Physical Exam  General/Appearance: appears stated age, well-groomed, appears comfortable when lying still and supine  Eyes: EOMI, no scleral injection, no icterus  ENT: MMM  Neck: supple, nl ROM, no stiffness  Cardiovascular: RRR, nl S1S2, no m/r/g, 2+ pulses in all 4 extremities, cap refill <2sec  Respiratory: CTAB, good air movement throughout, no wheezes/rhonchi/rales, no increased WOB, no retractions  Back: no lesions  GI: abd soft, non-distended, significant L inguinal ttp,  no HSM, no rebound, no guarding, nl BS  : nl bilateral testicular lie, no testicular swelling/lesions/masses. Significant ttp to L testicle -- especially posteriorly. No penile lesions or purulent drainage.  MSK: MONTALVO, good tone, no bony abnormality  Skin: warm and well-perfused, no rash, no edema, no ecchymosis, nl turgor  Neuro: GCS 15, alert and oriented, no gross focal neuro deficits  Psych: interacts appropriately  Heme: no petechia, no purpura, no active bleeding    Emergency Department Course     Imaging:  Radiographic findings were communicated with the patient who voiced understanding of the findings.    US Testicular & Scrotum w Doppler Ltd  1. Left epididymitis.  2. Normal testicles.  As read by Radiology.     Laboratory:  CBC: WBC 14.4 (H), HGB 12.4 (L), o/w WNL ()  BMP: Glucose 161 (H), Creatinine 1.73 (H), GFR 40 (L), o/w WNL   INR: 2.97    UA: Trace blood, Protein albumin 30, Positive nitrite, Large Leukocyte Esterase, WBC 96, RBC 4, Many Bacteria, Squamous Epithelial 2, mucous present, o/w WNL    Interventions:  2031: Dilaudid 0.5 mg IV  2337: Levaquin 500 mg PO  The patient's symptoms were improved with parenteral narcotics.    Emergency Department Course:  Past medical records, nursing notes, and vitals reviewed.  2010: I performed an exam of the patient and obtained history, as documented above. GCS 15.  IV inserted and blood drawn.  The patient was taken for US, see imaging results " above.   2334: I rechecked the patient. Findings and plan explained to the Patient. Patient discharged home with instructions regarding supportive care, medications, and reasons to return. The importance of close follow-up was reviewed.      Impression & Plan      Medical Decision Making:  This patient is a 62-year-old male who presents with severe testicular pain, worse on the left during exam and on the right.  Physical exam is most consistent with epididymitis versus orchitis.  An ultrasound was obtained given the severity of his pain to look for blood flow.  This showed normal testicular flow and no torsion but was also consistent with epididymitis.  He will be started on levofloxacin.  Unfortunately he is on Coumadin so I made him aware that this drug interaction will likely increase his INR.  He is agreed to go in 4 days from now to have his INR retested so that his Coumadin can transiently be redosed as needed.  He will also be sent home on pain medication that will last him 1-2 days while this infection is improving.    Diagnosis:    ICD-10-CM    1. Epididymitis N45.1 UA with Microscopic     INR     INR     CANCELED: INR       Disposition:  discharged to home    Discharge Medications:  New Prescriptions    LEVOFLOXACIN (LEVAQUIN) 500 MG TABLET    Take 1 tablet (500 mg) by mouth daily for 10 days    OXYCODONE-ACETAMINOPHEN (PERCOCET) 5-325 MG PER TABLET    Take 1-2 tablets by mouth every 4 hours as needed for pain         Clemencia Lechuga  5/23/2018    EMERGENCY DEPARTMENT  I, Clemencia Lechuga, am serving as a scribe at 8:10 PM on 5/23/2018 to document services personally performed by Kerrie Maria based on my observations and the provider's statements to me.        Kerrie Maria MD  05/24/18 0018

## 2018-06-15 ENCOUNTER — ANTICOAGULATION THERAPY VISIT (OUTPATIENT)
Dept: ANTICOAGULATION | Facility: CLINIC | Age: 63
End: 2018-06-15

## 2018-06-15 ENCOUNTER — TELEPHONE (OUTPATIENT)
Dept: INTERNAL MEDICINE | Facility: CLINIC | Age: 63
End: 2018-06-15

## 2018-06-15 DIAGNOSIS — Z79.01 LONG-TERM (CURRENT) USE OF ANTICOAGULANTS: ICD-10-CM

## 2018-06-15 DIAGNOSIS — D68.51 FACTOR 5 LEIDEN MUTATION, HETEROZYGOUS (H): ICD-10-CM

## 2018-06-15 PROCEDURE — 99207 ZZC NO CHARGE NURSE ONLY: CPT | Performed by: INTERNAL MEDICINE

## 2018-06-15 NOTE — PROGRESS NOTES
ANTICOAGULATION FOLLOW-UP CLINIC VISIT    Patient Name:  Osvaldo Parry  Date:  6/15/2018  Contact Type:  Telephone/ pt called today to discuss INR drawn on 5/23.  He has not been compliant with INR checks.  Will do next INR check with MD visit.    SUBJECTIVE:     Patient Findings     Positives No Problem Findings           OBJECTIVE    INR   Date Value Ref Range Status   05/23/2018 2.97 (H) 0.86 - 1.14 Final       ASSESSMENT / PLAN  INR assessment THER    Recheck INR In: 2 WEEKS    INR Location Outside lab      Anticoagulation Summary as of 6/15/2018     INR goal 2.0-3.0   Today's INR 2.97 (5/23/2018)   Warfarin maintenance plan 7.5 mg (7.5 mg x 1) on Mon, Wed, Fri; 3.75 mg (7.5 mg x 0.5) all other days   Full warfarin instructions 7.5 mg on Mon, Wed, Fri; 3.75 mg all other days   Weekly warfarin total 37.5 mg   No change documented Kaitlin Lassiter RN   Plan last modified Kaitlin Lassiter RN (8/10/2016)   Next INR check 6/29/2018   Target end date     Indications   Long-term (current) use of anticoagulants [Z79.01] [Z79.01]  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]  Factor 5 Leiden mutation  heterozygous (H) [D68.51]         Anticoagulation Episode Summary     INR check location     Preferred lab     Send INR reminders to [facility] [address] [facility]    Comments       Anticoagulation Care Providers     Provider Role Specialty Phone number    Ugo Delgado MD [facility] Internal Medicine [phone]            See the Encounter Report to view Anticoagulation Flowsheet and Dosing Calendar (Go to Encounters tab in chart review, and find the Anticoagulation Therapy Visit)    Dosage adjustment made based on physician directed care plan.    Kaitlin Lassiter RN

## 2018-06-15 NOTE — MR AVS SNAPSHOT
Osvaldo Parry   6/15/2018   Anticoagulation Therapy Visit    Description:  62 year old male   Provider:  Ugo Delgado MD   Department:  [facility] Coagulation           INR as of 6/15/2018     Today's INR 2.97 (5/23/2018)      Anticoagulation Summary as of 6/15/2018     INR goal 2.0-3.0   Today's INR 2.97 (5/23/2018)   Full warfarin instructions 7.5 mg on Mon, Wed, Fri; 3.75 mg all other days   Next INR check 6/29/2018    Indications   Long-term (current) use of anticoagulants [Z79.01] [Z79.01]  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]  Factor 5 Leiden mutation  heterozygous (H) [D68.51]         Your next Anticoagulation Clinic appointment(s)     Jun 29, 2018  2:00 PM CDT   Anticoagulation Visit with [facility]   [facility] ([facility])    [address] [facility] [address]  [facility] [id number] [phone]              Contact Numbers     [facility] Phone Numbers:  Anticoagulation Clinic Appointments : [phone]  Anticoagulation Nurse: [phone]         June 2018 Details    Sun Mon Tue Wed Thu Fri Sat          1               2                 3               4               5               6               7               8               9                 10               11               12               13               14               15      7.5 mg   See details      16      3.75 mg           17      3.75 mg         18      7.5 mg         19      3.75 mg         20      7.5 mg         21      3.75 mg         22      7.5 mg         23      3.75 mg           24      3.75 mg         25      7.5 mg         26      3.75 mg         27      7.5 mg         28      3.75 mg         29            30                Date Details   06/15 This INR check       Date of next INR:  6/29/2018         How to take your warfarin dose     To take:  3.75 mg Take 0.5 of a 7.5 mg tablet.    To take:  7.5 mg Take 1 of the 7.5 mg tablets.

## 2018-06-15 NOTE — TELEPHONE ENCOUNTER
Results discussed with patient.  Assisted to schedule next INR visit.  See additional [facility] encounter for further documentation.  Kaitlin Lassiter RN

## 2018-06-15 NOTE — TELEPHONE ENCOUNTER
Patient calls. He had to reschedule appointment with Dr. Delgado today. Asks to have message sent to INR nurse that he did have INR drawn while in the ER on 5/23/18 and this was supposed to be sent to the INR nurse. Patient requesting to have INR nurse review INR drawn in the ER and let him know if this is okay or not.

## 2018-06-18 DIAGNOSIS — D73.5 SPLENIC INFARCTION: ICD-10-CM

## 2018-06-18 RX ORDER — WARFARIN SODIUM 7.5 MG/1
TABLET ORAL
Qty: 30 TABLET | Refills: 0 | Status: SHIPPED | OUTPATIENT
Start: 2018-06-18 | End: 2018-07-18

## 2018-06-18 NOTE — TELEPHONE ENCOUNTER
warfarin    Lab Results   Component Value Date    INR 2.97 (H) 05/23/2018    INR 2.7 (A) 08/25/2017    INR 2.7 (A) 05/17/2017    INR 2.7 (A) 03/01/2017    INR 2.4 (A) 11/02/2016     Last office visit 8/25/17.    Next 5 appointments (look out 90 days)     Jun 29, 2018  1:20 PM CDT   [facility]   [facility]    [facility] [address]  [facility] [id number] [phone]                INR also scheduled on 6/29/18.  Prescription approved per [facility] Refill Protocol.  Kaitlin Lassiter RN

## 2018-06-28 DIAGNOSIS — E11.22 TYPE 2 DIABETES MELLITUS WITH STAGE 3 CHRONIC KIDNEY DISEASE, WITHOUT LONG-TERM CURRENT USE OF INSULIN (H): ICD-10-CM

## 2018-06-28 DIAGNOSIS — N18.30 TYPE 2 DIABETES MELLITUS WITH STAGE 3 CHRONIC KIDNEY DISEASE, WITHOUT LONG-TERM CURRENT USE OF INSULIN (H): ICD-10-CM

## 2018-06-28 LAB — HBA1C MFR BLD: 6.1 % (ref 0–5.6)

## 2018-06-28 PROCEDURE — 83036 HEMOGLOBIN GLYCOSYLATED A1C: CPT | Performed by: INTERNAL MEDICINE

## 2018-06-28 PROCEDURE — 80061 LIPID PANEL: CPT | Performed by: INTERNAL MEDICINE

## 2018-06-28 PROCEDURE — 83721 ASSAY OF BLOOD LIPOPROTEIN: CPT | Mod: 59 | Performed by: INTERNAL MEDICINE

## 2018-06-28 PROCEDURE — 36415 COLL VENOUS BLD VENIPUNCTURE: CPT | Performed by: INTERNAL MEDICINE

## 2018-06-28 PROCEDURE — 80053 COMPREHEN METABOLIC PANEL: CPT | Performed by: INTERNAL MEDICINE

## 2018-06-29 ENCOUNTER — OFFICE VISIT (OUTPATIENT)
Dept: INTERNAL MEDICINE | Facility: CLINIC | Age: 63
End: 2018-06-29
Payer: COMMERCIAL

## 2018-06-29 ENCOUNTER — ANTICOAGULATION THERAPY VISIT (OUTPATIENT)
Dept: ANTICOAGULATION | Facility: CLINIC | Age: 63
End: 2018-06-29
Payer: COMMERCIAL

## 2018-06-29 VITALS
SYSTOLIC BLOOD PRESSURE: 108 MMHG | WEIGHT: 215 LBS | BODY MASS INDEX: 27.59 KG/M2 | HEART RATE: 96 BPM | OXYGEN SATURATION: 96 % | HEIGHT: 74 IN | RESPIRATION RATE: 16 BRPM | TEMPERATURE: 98.4 F | DIASTOLIC BLOOD PRESSURE: 60 MMHG

## 2018-06-29 DIAGNOSIS — N18.30 TYPE 2 DIABETES MELLITUS WITH STAGE 3 CHRONIC KIDNEY DISEASE, WITHOUT LONG-TERM CURRENT USE OF INSULIN (H): Primary | ICD-10-CM

## 2018-06-29 DIAGNOSIS — E11.22 TYPE 2 DIABETES MELLITUS WITH STAGE 3 CHRONIC KIDNEY DISEASE, WITHOUT LONG-TERM CURRENT USE OF INSULIN (H): Primary | ICD-10-CM

## 2018-06-29 DIAGNOSIS — D73.5 SPLENIC INFARCTION: ICD-10-CM

## 2018-06-29 DIAGNOSIS — N18.30 CKD (CHRONIC KIDNEY DISEASE) STAGE 3, GFR 30-59 ML/MIN (H): ICD-10-CM

## 2018-06-29 DIAGNOSIS — Z79.01 LONG-TERM (CURRENT) USE OF ANTICOAGULANTS: ICD-10-CM

## 2018-06-29 DIAGNOSIS — Z12.11 SPECIAL SCREENING FOR MALIGNANT NEOPLASMS, COLON: ICD-10-CM

## 2018-06-29 DIAGNOSIS — E78.5 HYPERLIPIDEMIA LDL GOAL <100: ICD-10-CM

## 2018-06-29 DIAGNOSIS — E78.5 HYPERLIPIDEMIA WITH TARGET LDL LESS THAN 100: ICD-10-CM

## 2018-06-29 DIAGNOSIS — D68.51 FACTOR 5 LEIDEN MUTATION, HETEROZYGOUS (H): ICD-10-CM

## 2018-06-29 DIAGNOSIS — I25.10 CORONARY ARTERY DISEASE INVOLVING NATIVE CORONARY ARTERY OF NATIVE HEART WITHOUT ANGINA PECTORIS: ICD-10-CM

## 2018-06-29 LAB
ALBUMIN SERPL-MCNC: 3.9 G/DL (ref 3.4–5)
ALP SERPL-CCNC: 72 U/L (ref 40–150)
ALT SERPL W P-5'-P-CCNC: 40 U/L (ref 0–70)
ANION GAP SERPL CALCULATED.3IONS-SCNC: 7 MMOL/L (ref 3–14)
AST SERPL W P-5'-P-CCNC: 30 U/L (ref 0–45)
BILIRUB SERPL-MCNC: 0.4 MG/DL (ref 0.2–1.3)
BUN SERPL-MCNC: 14 MG/DL (ref 7–30)
CALCIUM SERPL-MCNC: 8.9 MG/DL (ref 8.5–10.1)
CHLORIDE SERPL-SCNC: 105 MMOL/L (ref 94–109)
CHOLEST SERPL-MCNC: 150 MG/DL
CO2 SERPL-SCNC: 28 MMOL/L (ref 20–32)
CREAT SERPL-MCNC: 1.4 MG/DL (ref 0.66–1.25)
GFR SERPL CREATININE-BSD FRML MDRD: 52 ML/MIN/1.7M2
GLUCOSE SERPL-MCNC: 104 MG/DL (ref 70–99)
HDLC SERPL-MCNC: 21 MG/DL
INR POINT OF CARE: 1.8 (ref 0.86–1.14)
LDLC SERPL CALC-MCNC: ABNORMAL MG/DL
LDLC SERPL DIRECT ASSAY-MCNC: 87 MG/DL
NONHDLC SERPL-MCNC: 129 MG/DL
POTASSIUM SERPL-SCNC: 4.2 MMOL/L (ref 3.4–5.3)
PROT SERPL-MCNC: 8 G/DL (ref 6.8–8.8)
SODIUM SERPL-SCNC: 140 MMOL/L (ref 133–144)
TRIGL SERPL-MCNC: 416 MG/DL

## 2018-06-29 PROCEDURE — 99207 ZZC NO CHARGE NURSE ONLY: CPT

## 2018-06-29 PROCEDURE — 85610 PROTHROMBIN TIME: CPT | Mod: QW

## 2018-06-29 PROCEDURE — 99214 OFFICE O/P EST MOD 30 MIN: CPT | Performed by: INTERNAL MEDICINE

## 2018-06-29 PROCEDURE — 36416 COLLJ CAPILLARY BLOOD SPEC: CPT

## 2018-06-29 RX ORDER — METOPROLOL SUCCINATE 25 MG/1
25 TABLET, EXTENDED RELEASE ORAL DAILY
Qty: 90 TABLET | Refills: 3 | Status: SHIPPED | OUTPATIENT
Start: 2018-06-29 | End: 2019-07-19

## 2018-06-29 RX ORDER — LIRAGLUTIDE 6 MG/ML
INJECTION SUBCUTANEOUS
Qty: 18 ML | Refills: 0 | Status: SHIPPED | OUTPATIENT
Start: 2018-06-29 | End: 2018-10-08

## 2018-06-29 RX ORDER — ATORVASTATIN CALCIUM 20 MG/1
20 TABLET, FILM COATED ORAL DAILY
Qty: 90 TABLET | Refills: 3 | Status: SHIPPED | OUTPATIENT
Start: 2018-06-29 | End: 2019-07-19

## 2018-06-29 RX ORDER — LISINOPRIL 2.5 MG/1
2.5 TABLET ORAL DAILY
Qty: 90 TABLET | Refills: 3 | Status: SHIPPED | OUTPATIENT
Start: 2018-06-29 | End: 2019-07-19

## 2018-06-29 NOTE — PROGRESS NOTES
SUBJECTIVE:   Osvaldo Parry is a 62 year old male who presents to clinic today for the following health issues:    Diabetes Follow-up    Patient is checking blood sugars: twice daily.    Blood sugar testing frequency justification: n/a  Results are as follows:         am - 115-127         bedtime - 127    Diabetic concerns: None     Symptoms of hypoglycemia (low blood sugar): none     Paresthesias (numbness or burning in feet) or sores: No     Date of last diabetic eye exam: pt is due    BP Readings from Last 2 Encounters:   05/24/18 112/63   08/25/17 92/59     Hemoglobin A1C (%)   Date Value   06/28/2018 6.1 (H)   08/24/2017 6.0     LDL Cholesterol Calculated (mg/dL)   Date Value   06/28/2018     Cannot estimate LDL when triglyceride exceeds 400 mg/dL   08/24/2017 66     LDL Cholesterol Direct (mg/dL)   Date Value   06/28/2018 87     Diabetes Management Resources      Amount of exercise or physical activity: 6-7 days/week for an average of greater than 60 minutes    Problems taking medications regularly: sometimes    Medication side effects: pt feels his own fault due to not eating or taking medication    Diet: regular (no restrictions)    A1c remains within target range. He is currently on metformin 500 mg twice daily and victoza. He checks blood glucose levels twice a day, once in the mornings and once before bed. Notes that he walks a lot at his job as an .     Recent LDL at target. Noted that triglycerides increased (now 416 mg/dL). He is currently on atorvastatin 20 mg and niaspin.    Factor 5 leiden mutation   Patient is currently on warfarin. He has not been compliant with INR checks. Notes that he lives in [place] and it is cumbersome to drive all the way here to [place] for the INR checks.     Notes that he presented to the ED on 5/23/2018 for evaluation of groin pain. He was diagnosed with epididymitis and treated with levofloxacin and advised that it could increase his INR. His  "INR at the hospital was 2.97 (his range is between 2-3).     Last seen by Dr. Nicolas of cardiology 7/10/2018.     Tobacco use  Patient reports that he has been using Chantix and nicotine gum which has helped him cut down to half a pack a day.     Colon cancer screening  He has never had a colonoscopy or FIT test.     Vision  Wears cheaters. Requesting a referral to have an eye exam    Past/recent records reviewed and discussed for:  -Reviewed most labs from 6/28/2018.   -Recent kidney function test improved. Recommended avoiding use of ibuprofen.   -Patient is looking into getting various dental work up.     Problem list and histories reviewed & adjusted, as indicated.  Additional history: as documented    Reviewed and updated as needed this visit by clinical staff  Tobacco  Allergies  Med Hx  Surg Hx  Fam Hx  Soc Hx      Reviewed and updated as needed this visit by Provider         ROS:  No dyspnea or cough. No chest discomfort, dizziness or palpitations. No diarrhea, abdominal pain or rectal bleeding.   No acute problems with vision or speech, lateralizing weakness or paresthesias.    ROS: as above or negative for Respiratory, CV, GI, endocrine, neuro systems.    This document serves as a record of the services and decisions personally performed and made by Ugo Delgado MD. It was created on his behalf by Brianne Brock, a trained medical scribe. The creation of this document is based on the provider's statements to the medical scribe.  Brianne Brock June 29, 2018 1:42 PM     OBJECTIVE:     /60 (BP Location: Left arm, Patient Position: Chair, Cuff Size: Adult Large)  Pulse 96  Temp 98.4  F (36.9  C) (Oral)  Resp 16  Ht 1.88 m (6' 2\")  Wt 97.5 kg (215 lb)  SpO2 96%  BMI 27.6 kg/m2  Body mass index is 27.6 kg/(m^2).     GENERAL: healthy, alert and no distress  RESP: lungs clear to auscultation - no rales, rhonchi or wheezes  CV: regular rate and rhythm, normal S1 S2, no S3 or S4, no murmur, click " or rub, no peripheral edema and peripheral pulses strong  MS: no gross musculoskeletal defects noted, no edema  SKIN: no suspicious lesions or rashes  NEURO: Normal strength and tone, mentation intact and speech normal  PSYCH: mentation appears normal, affect normal/bright    ASSESSMENT/PLAN:   (E11.22,  N18.3) Type 2 diabetes mellitus with stage 3 chronic kidney disease, without long-term current use of insulin (H)  (primary encounter diagnosis)  Comment: A1c within target range. Refilled rx, continue current meds.   Plan: metFORMIN (GLUCOPHAGE) 500 MG tablet, insulin         pen needle (B-D U/F) 31G X 8 MM, blood glucose         monitoring (ONETOUCH ULTRA) test strip,         liraglutide (VICTOZA PEN) 18 MG/3ML soln          (E78.5) Hyperlipidemia LDL goal <100  Plan: atorvastatin (LIPITOR) 20 MG tablet  (E78.5) Hyperlipidemia with target LDL less than 100  Comment: Discussed that niaspan may increase HDL but also affects blood glucose levels. Recommend discontinuing niaspan, but suggest running this through with Dr. Nicolas.     (Z79.01) Long-term (current) use of anticoagulants [Z79.01]  Comment: Suggested asking INR nurse if there are any INR clinics closer to home in [place].     (D73.5) Splenic infarction  Comment: Stable.     (I25.10) Coronary artery disease involving native coronary artery of native heart without angina pectoris  Comment: Stable. No symptoms of angina or CHF present today. Continue current meds and follow up with cardiology as advised  Plan: lisinopril (PRINIVIL/ZESTRIL) 2.5 MG tablet,         metoprolol succinate (TOPROL-XL) 25 MG 24 hr         tablet          (N18.3) CKD (chronic kidney disease) stage 3, GFR 30-59 ml/min  Comment: Stable, recent kidney function test improved. Continue current meds.   Plan: lisinopril (PRINIVIL/ZESTRIL) 2.5 MG tablet          (Z12.11) Special screening for malignant neoplasms, colon  Comment: FIT test ordered today. Recommend colonoscopy--discussed that  due to current warfarin use, may have to bridge prior to procedure.   Plan: Fecal colorectal cancer screen (FIT)          FUTURE APPOINTMENTS:       - Follow-up visit in 4 or 5 months    Patient Instructions   Diabetes, LDL-Cholesterol and blood pressure all look fine.   Refills of needed meds sent to your pharmacy.     Due for appointment with Dr Nicolas.     Ask INR nurse about any INR clinics closer to your home.     Schedule an appointment to see me back in October or November.     The information in this document, created by the medical scribe for me, accurately reflects the services I personally performed and the decisions made by me. I have reviewed and approved this document for accuracy prior to leaving the patient care area.  June 29, 2018 1:42 PM    Ugo Delgado MD  [facility]

## 2018-06-29 NOTE — MR AVS SNAPSHOT
Osvaldo Parry   6/29/2018 2:00 PM   Anticoagulation Therapy Visit    Description:  62 year old male   Provider:  [facility]   Department:  [facility]           INR as of 6/29/2018     Today's INR 1.8!      Anticoagulation Summary as of 6/29/2018     INR goal 2.0-3.0   Today's INR 1.8!   Full warfarin instructions 6/30: 7.5 mg; Otherwise 7.5 mg on Mon, Wed, Fri; 3.75 mg all other days   Next INR check 7/20/2018    Indications   Long-term (current) use of anticoagulants [Z79.01] [Z79.01]  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]  Factor 5 Leiden mutation  heterozygous (H) [D68.51]         Contact Numbers     [facility] Phone Numbers:  Anticoagulation Clinic Appointments : [phone]  Anticoagulation Nurse: [phone]         June 2018 Details    Sun Mon Tue Wed Thu Fri Sat          1               2                 3               4               5               6               7               8               9                 10               11               12               13               14               15               16                 17               18               19               20               21               22               23                 24               25               26               27               28               29      7.5 mg   See details      30      7.5 mg          Date Details   06/29 This INR check               How to take your warfarin dose     To take:  7.5 mg Take 1 of the 7.5 mg tablets.           July 2018 Details    Sun Mon Tue Wed Thu Fri Sat     1      3.75 mg         2      7.5 mg         3      3.75 mg         4      7.5 mg         5      3.75 mg         6      7.5 mg         7      3.75 mg           8      3.75 mg         9      7.5 mg         10      3.75 mg         11      7.5 mg         12      3.75 mg         13      7.5 mg         14      3.75 mg           15      3.75 mg         16      7.5 mg         17      3.75 mg          18      7.5 mg         19      3.75 mg         20            21                 22               23               24               25               26               27               28                 29               30               31                    Date Details   No additional details    Date of next INR:  7/20/2018         How to take your warfarin dose     To take:  3.75 mg Take 0.5 of a 7.5 mg tablet.    To take:  7.5 mg Take 1 of the 7.5 mg tablets.

## 2018-06-29 NOTE — LETTER
"  [facility]  [address] [place] [address]  [phone]    6/29/2018    Osvaldo MATA Sohan  [address] [facility] [address]           Dear Ayde Sohan,    The results of your lab tests are enclosed.   Unless noted otherwise below, any results that are outside the \"normal\" range are within acceptable limits and are of no concern.    Hemoglobin A1C measures control of diabetes. Your Hemoglobin A1C is 6.1. The ideal target is under 7.0, although below 8.0 may be acceptable for some patients.    LDL= Bad Cholesterol-- the target is below 100.     HDL= Good Cholesterol-- although this is determined mostly by heredity, exercise and/or medications may sometimes raise this number.    Triglycerides are another type of fat in the blood, and can sometimes be lowered by reducing intake of sweets or excess carbohydrates, alcohol, and by weight reduction if needed.  Sometimes medications are also used.    AST and ALT are liver tests, as are the bilirubin (total and direct), albumin, total protein, and alkaline phosphatase. Yours are all normal.     Urea Nitrogen and Creatinine are kidney tests--your kidney function is just mildly reduced but stable from before. GFR stands for Glomerular Filtration Rate, a more precise estimate of kidney function.     Sodium, Potassium, Chloride, Carbon Dioxide, and Calcium are all normal salts in the bloodstream. Yours all look normal. Your glucose (blood sugar) also looks fine. (You can ignore the anion gap result).     If you have any further questions or problems, please contact our office.    Sincerely,    Ugo Delgado MD  Attachment: Lab results     "

## 2018-06-29 NOTE — PROGRESS NOTES
ANTICOAGULATION FOLLOW-UP CLINIC VISIT    Patient Name:  Osvaldo Parry  Date:  6/29/2018  Contact Type:  Face to Face    SUBJECTIVE:     Patient Findings     Positives Unexplained INR or factor level change (denies any changes or missed doses.)    Comments Patient has been very noncompliant with INR checks due to his work schedule.  Information given to patient for other [facility] closer to his home to try to help with compliance issues.           OBJECTIVE    INR Protime   Date Value Ref Range Status   06/29/2018 1.8 (A) 0.86 - 1.14 Final       ASSESSMENT / PLAN  INR assessment SUB    Recheck INR In: 4 WEEKS    INR Location Clinic      Anticoagulation Summary as of 6/29/2018     INR goal 2.0-3.0   Today's INR 1.8!   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]  Factor 5 Leiden mutation  heterozygous (H) [D68.51]         Anticoagulation Episode Summary     INR check location     Preferred lab     Send INR reminders to [facility]    Comments       Anticoagulation Care Providers     Provider Role Specialty Phone number    Ugo Delgado MD [facility] Internal Medicine [phone]            See the Encounter Report to view Anticoagulation Flowsheet and Dosing Calendar (Go to Encounters tab in chart review, and find the Anticoagulation Therapy Visit)    Dosage adjustment made based on physician directed care plan.    Kaitlin Lassiter RN

## 2018-06-29 NOTE — PATIENT INSTRUCTIONS
Diabetes, LDL-Cholesterol and blood pressure all look fine.   Refills of needed meds sent to your pharmacy.     Due for appointment with Dr Nicolas.     Ask INR nurse about any INR clinics closer to your home.     Schedule an appointment to see me back in October or November.

## 2018-06-29 NOTE — MR AVS SNAPSHOT
After Visit Summary   6/29/2018    Osvaldo Parry    MRN: [id number]           Patient Information     Date Of Birth          1955        Visit Information        Provider Department      6/29/2018 1:20 PM Ugo Delgado MD [facility]        Today's Diagnoses     Type 2 diabetes mellitus with stage 3 chronic kidney disease, without long-term current use of insulin (H)    -  1    Hyperlipidemia with target LDL less than 100        Long-term (current) use of anticoagulants [Z79.01]        Splenic infarction        Hyperlipidemia LDL goal <100        Coronary artery disease involving native coronary artery of native heart without angina pectoris        CKD (chronic kidney disease) stage 3, GFR 30-59 ml/min        Special screening for malignant neoplasms, colon          Care Instructions    Diabetes, LDL-Cholesterol and blood pressure all look fine.   Refills of needed meds sent to your pharmacy.     Due for appointment with Dr Nicolas.     Ask INR nurse about any INR clinics closer to your home.     Schedule an appointment to see me back in October or November.           Follow-ups after your visit        Future tests that were ordered for you today     Open Future Orders        Priority Expected Expires Ordered    Fecal colorectal cancer screen (FIT) Routine 7/20/2018 9/21/2018 6/29/2018    Albumin Random Urine Quantitative with Creat Ratio Routine  6/28/2019 6/28/2018            Who to contact     If you have questions or need follow up information about today's clinic visit or your schedule please contact [facility] directly at [phone].  Normal or non-critical lab and imaging results will be communicated to you by MyChart, letter or phone within 4 business days after the clinic has received the results. If you do not hear from us within 7 days, please contact the clinic through MyChart or phone. If you have a critical or abnormal lab result, we will notify  "you by phone as soon as possible.  Submit refill requests through [employer] or call your pharmacy and they will forward the refill request to us. Please allow 3 business days for your refill to be completed.          Additional Information About Your Visit        [employer] Information     [employer] lets you send messages to your doctor, view your test results, renew your prescriptions, schedule appointments and more. To sign up, go to [email or web address] . Click on \"Log in\" on the left side of the screen, which will take you to the Welcome page. Then click on \"Sign up Now\" on the right side of the page.     You will be asked to enter the access code listed below, as well as some personal information. Please follow the directions to create your username and password.     Your access code is: [email or web address]  Expires: 2018  1:21 PM     Your access code will  in 90 days. If you need help or a new code, please call your [place] clinic or [phone].        Care EveryWhere ID     This is your Care EveryWhere ID. This could be used by other organizations to access your [place] medical records  [id number]        Your Vitals Were     Pulse Temperature Respirations Height Pulse Oximetry BMI (Body Mass Index)    96 98.4  F (36.9  C) (Oral) 16 6' 2\" (1.88 m) 96% 27.6 kg/m2       Blood Pressure from Last 3 Encounters:   18 108/60   18 112/63   17 92/59    Weight from Last 3 Encounters:   18 215 lb (97.5 kg)   18 210 lb (95.3 kg)   17 211 lb 1.6 oz (95.8 kg)                 Today's Medication Changes          These changes are accurate as of 18  2:17 PM.  If you have any questions, ask your nurse or doctor.               These medicines have changed or have updated prescriptions.        Dose/Directions    atorvastatin 20 MG tablet   Commonly known as:  LIPITOR   This may have changed:  See the new instructions.   Used for:  Hyperlipidemia LDL goal <100   Changed by:  Sandy, " Ugo MOTA MD        Dose:  20 mg   Take 1 tablet (20 mg) by mouth daily   Quantity:  90 tablet   Refills:  3       blood glucose monitoring test strip   Commonly known as:  ONETOUCH ULTRA   This may have changed:  See the new instructions.   Used for:  Type 2 diabetes mellitus with stage 3 chronic kidney disease, without long-term current use of insulin (H)   Changed by:  Ugo Delgado MD        Use to test blood sugar two times daily or as directed.   Quantity:  100 each   Refills:  1       liraglutide 18 MG/3ML soln   Commonly known as:  VICTOZA PEN   This may have changed:  See the new instructions.   Used for:  Type 2 diabetes mellitus with stage 3 chronic kidney disease, without long-term current use of insulin (H)   Changed by:  Ugo Delgado MD        INJECT 1.2MG UNDER THE SKIN ONCE DAILY   Quantity:  18 mL   Refills:  0       metFORMIN 500 MG tablet   Commonly known as:  GLUCOPHAGE   This may have changed:  See the new instructions.   Used for:  Type 2 diabetes mellitus with stage 3 chronic kidney disease, without long-term current use of insulin (H)   Changed by:  Ugo Delgado MD        Dose:  500 mg   Take 1 tablet (500 mg) by mouth 2 times daily (with meals)   Quantity:  180 tablet   Refills:  1            Where to get your medicines      These medications were sent to [facility] [place] [id number]     Phone:  [phone]     atorvastatin 20 MG tablet    blood glucose monitoring test strip    insulin pen needle 31G X 8 MM    liraglutide 18 MG/3ML soln    lisinopril 2.5 MG tablet    metFORMIN 500 MG tablet    metoprolol succinate 25 MG 24 hr tablet                Primary Care Provider Office Phone # Fax #    Ugo Delgado [phone]       [address] [facility] [address]  [facility] [address]        Goals        General    I will avoid all energy drinks. (pt-stated)     Notes - Note edited  1/7/2014   3:42 PM by Cherelle Mccarthy, RN    As of today's date 1/7/2014 goal is met at 76 - 100%.   Goal Status:  Ongoing          Equal Access to Services     JACQUELINESHREE MARY KATE : Hadkiran weller valentinajr Kline, jose roberto toneykaren, lucy kamary kay kellogg, cheryl yusrain hayaan mtdimitri guzman laChristalyric azael. So [facility] [phone].    ATENCIÓN: Si habla español, tiene a cruz disposición servicios gratuitos de asistencia lingüística. Llame al [phone].    We comply with applicable federal civil rights laws and [place] laws. We do not discriminate on the basis of race, color, national origin, age, disability, sex, sexual orientation, or gender identity.            Thank you!     Thank you for choosing [facility]  for your care. Our goal is always to provide you with excellent care. Hearing back from our patients is one way we can continue to improve our services. Please take a few minutes to complete the written survey that you may receive in the mail after your visit with us. Thank you!             Your Updated Medication List - Protect others around you: Learn how to safely use, store and throw away your medicines at www.disposemymeds.org.          This list is accurate as of 6/29/18  2:17 PM.  Always use your most recent med list.                   Brand Name Dispense Instructions for use Diagnosis    albuterol (2.5 MG/3ML) 0.083% neb solution     25 vial    Take 1 vial (2.5 mg) by nebulization every 6 hours as needed for shortness of breath / dyspnea or wheezing    Cough       atorvastatin 20 MG tablet    LIPITOR    90 tablet    Take 1 tablet (20 mg) by mouth daily    Hyperlipidemia LDL goal <100       blood glucose monitoring meter device kit     1 kit    USE TO TEST BLOOD SUGAR ONCE DAILY    Type 2 diabetes mellitus with stage 3 chronic kidney disease, without long-term current use of insulin (H)       blood glucose monitoring test strip    ONETOUCH ULTRA    100 each    Use to test blood sugar two times daily or as  directed.    Type 2 diabetes mellitus with stage 3 chronic kidney disease, without long-term current use of insulin (H)       HYDROcodone-acetaminophen 5-325 MG per tablet    NORCO    10 tablet    Take 1-2 tablets by mouth daily as needed for severe pain    Dental infection       insulin pen needle 31G X 8 MM    B-D U/F    100 each    USE AS DIRECTED    Type 2 diabetes mellitus with stage 3 chronic kidney disease, without long-term current use of insulin (H)       liraglutide 18 MG/3ML soln    VICTOZA PEN    18 mL    INJECT 1.2MG UNDER THE SKIN ONCE DAILY    Type 2 diabetes mellitus with stage 3 chronic kidney disease, without long-term current use of insulin (H)       lisinopril 2.5 MG tablet    PRINIVIL/Zestril    90 tablet    Take 1 tablet (2.5 mg) by mouth daily    Coronary artery disease involving native coronary artery of native heart without angina pectoris, CKD (chronic kidney disease) stage 3, GFR 30-59 ml/min       metFORMIN 500 MG tablet    GLUCOPHAGE    180 tablet    Take 1 tablet (500 mg) by mouth 2 times daily (with meals)    Type 2 diabetes mellitus with stage 3 chronic kidney disease, without long-term current use of insulin (H)       metoprolol succinate 25 MG 24 hr tablet    TOPROL-XL    90 tablet    Take 1 tablet (25 mg) by mouth daily    Coronary artery disease involving native coronary artery of native heart without angina pectoris       niacin 500 MG CR tablet    NIASPAN    90 tablet    Take 1 tablet (500 mg) by mouth At Bedtime    Coronary artery disease involving native coronary artery without angina pectoris, Hyperlipidemia with target LDL less than 100       nicotine polacrilex 2 MG gum    NICORETTE    30 tablet    Place 1 each (2 mg) inside cheek as needed for smoking cessation    Smoker       ONETOUCH DELICA LANCETS 33G Misc     100 each    USE TO TEST BLOOD SUGAR ONCE DAILY    Type 2 diabetes mellitus with stage 3 chronic kidney disease, without long-term current use of insulin (H)        order for DME     1 Units    Equipment being ordered: Nebulizer machine for home use    Cough       * oxyCODONE-acetaminophen 5-325 MG per tablet    PERCOCET    10 tablet    Take 1-2 tablets by mouth daily as needed for severe pain    Abscessed tooth       * oxyCODONE-acetaminophen 5-325 MG per tablet    PERCOCET    12 tablet    Take 1-2 tablets by mouth every 4 hours as needed for pain        penicillin V potassium 500 MG tablet    VEETID    40 tablet    Take 1 tablet (500 mg) by mouth 4 times daily    Dental infection       sildenafil 100 MG tablet    VIAGRA    6 tablet    Take 0.5-1 tablets ( mg) by mouth daily as needed for erectile dysfunction Take 30 min to 4 hours before intercourse.    Other male erectile dysfunction       * varenicline 0.5 MG X 11 & 1 MG X 42 tablet    CHANTIX STARTING MONTH ANTONIO    53 tablet    Take 0.5 mg tab daily for 3 days, then 0.5 mg tab twice daily for 4 days, then 1 mg twice daily.    Smoker       * varenicline 1 MG tablet    CHANTIX CONTINUING MONTH ANTONIO    60 tablet    Take 1 tablet (1 mg) by mouth 2 times daily    Smoker       warfarin 7.5 MG tablet    COUMADIN    30 tablet    Take 1/2 tablet (3.75 mg) Tuesday, Thursday, Saturday and Sunday, and 1 tablet (7.5 mg) on Monday, Wednesday and Friday, or as instructed.    Splenic infarction       * Notice:  This list has 4 medication(s) that are the same as other medications prescribed for you. Read the directions carefully, and ask your doctor or other care provider to review them with you.

## 2018-07-18 DIAGNOSIS — E11.22 TYPE 2 DIABETES MELLITUS WITH STAGE 3 CHRONIC KIDNEY DISEASE, WITHOUT LONG-TERM CURRENT USE OF INSULIN (H): ICD-10-CM

## 2018-07-18 DIAGNOSIS — D73.5 SPLENIC INFARCTION: ICD-10-CM

## 2018-07-18 DIAGNOSIS — N18.30 TYPE 2 DIABETES MELLITUS WITH STAGE 3 CHRONIC KIDNEY DISEASE, WITHOUT LONG-TERM CURRENT USE OF INSULIN (H): ICD-10-CM

## 2018-07-18 RX ORDER — WARFARIN SODIUM 7.5 MG/1
TABLET ORAL
Qty: 30 TABLET | Refills: 0 | Status: SHIPPED | OUTPATIENT
Start: 2018-07-18 | End: 2018-08-14

## 2018-07-18 NOTE — TELEPHONE ENCOUNTER
"Requested Prescriptions   Pending Prescriptions Disp Refills      [Pharmacy Med Name: ONETOUCH DELICA LANCETS 33G  MISC] 100 each 0    Last Written Prescription Date:  04/11/2018  Last Fill Quantity: 100,  # refills: 0   Last office visit: 6/29/2018 with prescribing provider:     Future Office Visit:   Sig: USE TO TEST BLOOD SUGAR ONCE DAILY    Diabetic Supplies Protocol Passed    7/18/2018 10:05 AM       Passed - Patient is 18 years of age or older       Passed - Recent (6 mo) or future (30 days) visit within the authorizing provider's specialty    Patient had office visit in the last 6 months or has a visit in the next 30 days with authorizing provider.  See \"Patient Info\" tab in inbasket, or \"Choose Columns\" in Meds & Orders section of the refill encounter.            "

## 2018-07-18 NOTE — TELEPHONE ENCOUNTER
"Requested Prescriptions   Pending Prescriptions Disp Refills     warfarin (COUMADIN) 7.5 MG tablet 30 tablet 0     Sig: Take 1/2 tablet (3.75 mg) Tuesday, Thursday, Saturday and Sunday, and 1 tablet (7.5 mg) on Monday, Wednesday and Friday, or as instructed.    Vitamin K Antagonists Failed    7/18/2018  2:09 PM       Failed - INR is within goal in the past 6 weeks    Confirm INR is within goal in the past 6 weeks.     Recent Labs   Lab Test 06/29/18   INR  1.8*                      Passed - Recent (12 mo) or future (30 days) visit within the authorizing provider's specialty    Patient had office visit in the last 12 months or has a visit in the next 30 days with authorizing provider or within the authorizing provider's specialty.  See \"Patient Info\" tab in inbasket, or \"Choose Columns\" in Meds & Orders section of the refill encounter.           Passed - Patient is 18 years of age or older        Last office visit 6/29/18.  Prescription approved per [facility] Refill Protocol.  Kaitlin Lassiter RN    "

## 2018-07-20 DIAGNOSIS — E78.5 HYPERLIPIDEMIA WITH TARGET LDL LESS THAN 100: ICD-10-CM

## 2018-07-20 DIAGNOSIS — I25.10 CORONARY ARTERY DISEASE INVOLVING NATIVE CORONARY ARTERY WITHOUT ANGINA PECTORIS: ICD-10-CM

## 2018-07-20 RX ORDER — NIACIN 500 MG/1
500 TABLET, EXTENDED RELEASE ORAL AT BEDTIME
Qty: 90 TABLET | Refills: 0 | Status: SHIPPED | OUTPATIENT
Start: 2018-07-20 | End: 2018-11-05

## 2018-07-23 RX ORDER — LANCETS 33 GAUGE
EACH MISCELLANEOUS
Qty: 100 EACH | Refills: 3 | Status: SHIPPED | OUTPATIENT
Start: 2018-07-23 | End: 2018-11-04

## 2018-07-26 ENCOUNTER — TELEPHONE (OUTPATIENT)
Dept: INTERNAL MEDICINE | Facility: CLINIC | Age: 63
End: 2018-07-26

## 2018-07-26 NOTE — TELEPHONE ENCOUNTER
Panel Management Review      Patient has the following on his problem list:     Diabetes    ASA: Not Required     Last A1C  Lab Results   Component Value Date    A1C 6.1 06/28/2018    A1C 6.0 08/24/2017    A1C 6.1 08/10/2016    A1C 5.9 10/08/2015    A1C 6.0 03/24/2015     A1C tested: Passed    Last LDL:    Lab Results   Component Value Date    CHOL 150 06/28/2018     Lab Results   Component Value Date    HDL 21 06/28/2018     Lab Results   Component Value Date    LDL  06/28/2018     Cannot estimate LDL when triglyceride exceeds 400 mg/dL    LDL 87 06/28/2018     Lab Results   Component Value Date    TRIG 416 06/28/2018     Lab Results   Component Value Date    CHOLHDLRATIO 5.5 10/08/2015     Lab Results   Component Value Date    NHDL 129 06/28/2018       Is the patient on a Statin? YES             Is the patient on Aspirin? NO    Medications     HMG CoA Reductase Inhibitors    atorvastatin (LIPITOR) 20 MG tablet          Last three blood pressure readings:  BP Readings from Last 3 Encounters:   06/29/18 108/60   05/24/18 112/63   08/25/17 92/59       Date of last diabetes office visit: 6/29/2018     Tobacco History:     History   Smoking Status     Current Every Day Smoker     Packs/day: 0.50   Smokeless Tobacco     Never Used           IVD   ASA: FAILED    Last LDL:    Lab Results   Component Value Date    CHOL 150 06/28/2018     Lab Results   Component Value Date    HDL 21 06/28/2018     Lab Results   Component Value Date    LDL  06/28/2018     Cannot estimate LDL when triglyceride exceeds 400 mg/dL    LDL 87 06/28/2018     Lab Results   Component Value Date    TRIG 416 06/28/2018        Lab Results   Component Value Date    CHOLHDLRATIO 5.5 10/08/2015        Is the patient on a Statin? YES   Is the patient on Aspirin? NO                  Medications     HMG CoA Reductase Inhibitors    atorvastatin (LIPITOR) 20 MG tablet          Last three blood pressure readings:  BP Readings from Last 3 Encounters:   06/29/18  108/60   05/24/18 112/63   08/25/17 92/59        Tobacco History:     History   Smoking Status     Current Every Day Smoker     Packs/day: 0.50   Smokeless Tobacco     Never Used       Hypertension   Last three blood pressure readings:  BP Readings from Last 3 Encounters:   06/29/18 108/60   05/24/18 112/63   08/25/17 92/59     Blood pressure: Passed    HTN Guidelines:  Age 18-59 BP range:  Less than 140/90  Age 60-85 with Diabetes:  Less than 140/90  Age 60-85 without Diabetes:  less than 150/90      Composite cancer screening  Chart review shows that this patient is due/due soon for the following Colonoscopy and Fecal Colorectal (FIT)  Summary:    Patient is due/failing the following:   COLONOSCOPY and FOLLOW UP    Action needed:   Colonoscopy/FIT    Type of outreach:    none, patient has appointment 10/30/2018.    Questions for provider review:    None                                                                                                                                    Crist[facility]       Chart routed to closed .

## 2018-08-14 DIAGNOSIS — D73.5 SPLENIC INFARCTION: ICD-10-CM

## 2018-08-14 RX ORDER — WARFARIN SODIUM 7.5 MG/1
TABLET ORAL
Qty: 20 TABLET | Refills: 0 | Status: SHIPPED | OUTPATIENT
Start: 2018-08-14 | End: 2018-09-22

## 2018-08-14 NOTE — TELEPHONE ENCOUNTER
"Warfarin 7.5 mg      Last Written Prescription Date: 7/18/18  Last Fill Quantity: 30,   # refills: 0  Last Office Visit: 6/29/18  Future Office visit:    Next 5 appointments (look out 90 days)     Oct 30, 2018  9:20 AM CDT   SHORT with Ugo Delgado MD   [facility] ([facility])    [address]  [facility] [address]   [phone]            Nov 07, 2018 10:15 AM CST   Return Visit with Joseph Nicolas MD   [facility] ([facility])    [address]  [facility] [address] [phone] OPT 2               Called pt, appt for INR check scheduled on 8/20/18, pt was due for INR check 7/20/18.       Requested Prescriptions   Pending Prescriptions Disp Refills     warfarin (COUMADIN) 7.5 MG tablet 20 tablet 0     Sig: Take 1/2 tablet (3.75 mg) Tuesday, Thursday, Saturday and Sunday, and 1 tablet (7.5 mg) on Monday, Wednesday and Friday, or as instructed.    Vitamin K Antagonists Failed    8/14/2018 10:32 AM       Failed - INR is within goal in the past 6 weeks    Confirm INR is within goal in the past 6 weeks.     Recent Labs   Lab Test 06/29/18   INR  1.8*                      Passed - Recent (12 mo) or future (30 days) visit within the authorizing provider's specialty    Patient had office visit in the last 12 months or has a visit in the next 30 days with authorizing provider or within the authorizing provider's specialty.  See \"Patient Info\" tab in inbasket, or \"Choose Columns\" in Meds & Orders section of the refill encounter.           Passed - Patient is 18 years of age or older        Prescription approved per [facility] Refill Protocol.  Yumiko Storey RN    "

## 2018-09-13 DIAGNOSIS — N18.30 TYPE 2 DIABETES MELLITUS WITH STAGE 3 CHRONIC KIDNEY DISEASE, WITHOUT LONG-TERM CURRENT USE OF INSULIN (H): ICD-10-CM

## 2018-09-13 DIAGNOSIS — E11.22 TYPE 2 DIABETES MELLITUS WITH STAGE 3 CHRONIC KIDNEY DISEASE, WITHOUT LONG-TERM CURRENT USE OF INSULIN (H): ICD-10-CM

## 2018-09-13 NOTE — TELEPHONE ENCOUNTER
"Requested Prescriptions   Pending Prescriptions Disp Refills     B-D U/F 31G X 8 MM insulin pen needle [Pharmacy Med Name: BD PEN NEEDLE SHORT 31G X 8 MM MISC] 100 each 1    Last Written Prescription Date:  06/29/2018  Last Fill Quantity: 100,  # refills: 1   Last office visit: 6/29/2018 with prescribing provider:     Future Office Visit:   Next 5 appointments (look out 90 days)     Oct 30, 2018  9:20 AM CDT   SHORT with Ugo Delgado MD   [facility] ([facility])    [address]  [facility] [address]   [phone]            Nov 07, 2018 10:15 AM CST   Return Visit with Joseph Nicolas MD   [facility] ([facility])    [address] [place] [address] [phone] OPT 2                Sig: USE AS DIRECTED    Diabetic Supplies Protocol Passed    9/13/2018  9:43 AM       Passed - Patient is 18 years of age or older       Passed - Recent (6 mo) or future (30 days) visit within the authorizing provider's specialty    Patient had office visit in the last 6 months or has a visit in the next 30 days with authorizing provider.  See \"Patient Info\" tab in inbasket, or \"Choose Columns\" in Meds & Orders section of the refill encounter.            "

## 2018-09-15 RX ORDER — PEN NEEDLE, DIABETIC 31 GX5/16"
NEEDLE, DISPOSABLE MISCELLANEOUS
Qty: 100 EACH | Refills: 1 | Status: SHIPPED | OUTPATIENT
Start: 2018-09-15 | End: 2018-11-04

## 2018-09-19 ENCOUNTER — TELEPHONE (OUTPATIENT)
Dept: CARDIOLOGY | Facility: CLINIC | Age: 63
End: 2018-09-19

## 2018-09-20 NOTE — TELEPHONE ENCOUNTER
Central Prior Authorization Team   Phone: [phone]      PA Initiation- Via phone, Case# [id number]    Medication: Niacin ER 500mg -  Insurance Company: [employer] - Phone [phone] Fax [phone]  Pharmacy Filling the Rx: [facility] [address] [facility]  Filling Pharmacy Phone:    Filling Pharmacy Fax:    Start Date: 9/20/2018

## 2018-09-22 DIAGNOSIS — D73.5 SPLENIC INFARCTION: ICD-10-CM

## 2018-09-22 NOTE — TELEPHONE ENCOUNTER
Hello,     We have been waiting for a refill authorization for this medication since 9/13/18. Please address as soon as possible.    Thank you,  Ada Abad & [facility] Staff Technician   [facility]  [email or web address]   Ph#: [phone] / [phone]   Fax#: [phone] / [phone]

## 2018-09-26 NOTE — TELEPHONE ENCOUNTER
Prior Authorization Approval    Authorization Effective Date: 9/6/2018  Authorization Expiration Date: 9/20/2019  Medication: Niacin ER 500mg - Approved  Approved Dose/Quantity:   Reference #:     Insurance Company: [employer] - Phone [phone] Fax [phone]  Expected CoPay:       CoPay Card Available:      Foundation Assistance Needed:    Which Pharmacy is filling the prescription (Not needed for infusion/clinic administered): [facility] [address] [facility]

## 2018-09-26 NOTE — TELEPHONE ENCOUNTER
"Pt is noncompliant with INR checks. Pt's last INR was 6/29/18 and result was 1.8. Pt canceled f/u appt on 8/20/18 and no showed for appt on 9/17.   Left message today for pt to call clinic to schedule INR appt.       Warfarin 7.5 mg      Last Written Prescription Date:  8/14/18  Last Fill Quantity: 20,   # refills: 0  Last Office Visit: 6/29/18  Future Office visit:    Next 5 appointments (look out 90 days)     Oct 30, 2018  9:20 AM CDT   SHORT with Ugo Delgado MD   [facility]    [address]  [facility] [address] [phone]            Nov 07, 2018 10:15 AM CST   Return Visit with Joseph Nicolas MD   [facility] ([facility])    [address] [place] [address] [phone] OPT 2                   Routing refill request to provider for review/approval because:  Pt is non compliant with INR checks, 30 day Rx sent 8/14/18 after speaking with pt and scheduling appt for 8/20/18  Requested Prescriptions   Pending Prescriptions Disp Refills     warfarin (COUMADIN) 7.5 MG tablet 20 tablet 0     Sig: Take 1/2 tablet (3.75 mg) Tuesday, Thursday, Saturday and Sunday, and 1 tablet (7.5 mg) on Monday, Wednesday and Friday, or as instructed.    Vitamin K Antagonists Failed    9/25/2018  4:17 PM       Failed - INR is within goal in the past 6 weeks    Confirm INR is within goal in the past 6 weeks.     Recent Labs   Lab Test 06/29/18   INR  1.8*                      Passed - Recent (12 mo) or future (30 days) visit within the authorizing provider's specialty    Patient had office visit in the last 12 months or has a visit in the next 30 days with authorizing provider or within the authorizing provider's specialty.  See \"Patient Info\" tab in inbasket, or \"Choose Columns\" in Meds & Orders section of the refill encounter.           Passed - Patient is 18 years of age or older        Yumiko Storey" RN

## 2018-09-27 RX ORDER — WARFARIN SODIUM 7.5 MG/1
TABLET ORAL
Qty: 10 TABLET | Refills: 0 | Status: SHIPPED | OUTPATIENT
Start: 2018-09-27 | End: 2018-10-08

## 2018-10-08 ENCOUNTER — ANTICOAGULATION THERAPY VISIT (OUTPATIENT)
Dept: ANTICOAGULATION | Facility: CLINIC | Age: 63
End: 2018-10-08
Payer: COMMERCIAL

## 2018-10-08 DIAGNOSIS — E11.22 TYPE 2 DIABETES MELLITUS WITH STAGE 3 CHRONIC KIDNEY DISEASE, WITHOUT LONG-TERM CURRENT USE OF INSULIN (H): ICD-10-CM

## 2018-10-08 DIAGNOSIS — D68.51 FACTOR 5 LEIDEN MUTATION, HETEROZYGOUS (H): ICD-10-CM

## 2018-10-08 DIAGNOSIS — N18.30 TYPE 2 DIABETES MELLITUS WITH STAGE 3 CHRONIC KIDNEY DISEASE, WITHOUT LONG-TERM CURRENT USE OF INSULIN (H): ICD-10-CM

## 2018-10-08 DIAGNOSIS — D73.5 SPLENIC INFARCTION: ICD-10-CM

## 2018-10-08 LAB — INR POINT OF CARE: 1.7 (ref 0.86–1.14)

## 2018-10-08 PROCEDURE — 99207 ZZC NO CHARGE NURSE ONLY: CPT

## 2018-10-08 PROCEDURE — 36416 COLLJ CAPILLARY BLOOD SPEC: CPT

## 2018-10-08 PROCEDURE — 85610 PROTHROMBIN TIME: CPT | Mod: QW

## 2018-10-08 RX ORDER — WARFARIN SODIUM 7.5 MG/1
TABLET ORAL
Qty: 75 TABLET | Refills: 0 | Status: SHIPPED | OUTPATIENT
Start: 2018-10-08 | End: 2019-01-22

## 2018-10-08 NOTE — PROGRESS NOTES
ANTICOAGULATION FOLLOW-UP CLINIC VISIT    Patient Name:  Osvaldo Parry  Date:  10/8/2018  Contact Type:  Face to Face    SUBJECTIVE:     Patient Findings     Positives Unexplained INR or factor level change    Comments Pt denies any missed warfarin doses or changes since last INR visit 6/29/18. Pt denies any bleeding or bruising problems.           OBJECTIVE    INR Protime   Date Value Ref Range Status   10/08/2018 1.7 (A) 0.86 - 1.14 Final       ASSESSMENT / PLAN  INR assessment SUB    Recheck INR In: 2 WEEKS    INR Location Clinic      Anticoagulation Summary as of 10/8/2018     INR goal 2.0-3.0   Today's INR 1.7!   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    Yumiko Storey RN

## 2018-10-08 NOTE — MR AVS SNAPSHOT
Osvaldo H Sohan   10/8/2018 1:30 PM   Anticoagulation Therapy Visit    Description:  63 year old male   Provider:  [facility]   Department:  [facility]           INR as of 10/8/2018     Today's INR 1.7!      Anticoagulation Summary as of 10/8/2018     INR goal 2.0-3.0   Today's INR 1.7!   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]  Factor 5 Leiden mutation  heterozygous (H) [D68.51]         Your next Anticoagulation Clinic appointment(s)     Oct 22, 2018  4:30 PM CDT   [facility]ticoagulation Visit with [facility]   [facility]    [address]  [facility] [address] [phone]              Contact Numbers     [facility] Phone Numbers:  Anticoagulation Clinic Appointments : [phone]  Anticoagulation Nurse: [phone]         October 2018 Details    Sun Mon Tue Wed Thu Fri Sat      1               2               3               4               5               6                 7               8      11.25 mg   See details      9      3.75 mg         10      7.5 mg         11      3.75 mg         12      7.5 mg         13      7.5 mg           14      3.75 mg         15      7.5 mg         16      3.75 mg         17      7.5 mg         18      3.75 mg         19      7.5 mg         20      7.5 mg           21      3.75 mg         22            23               24               25               26               27                 28               29               30               31                   Date Details   10/08 This INR check       Date of next INR:  10/22/2018         How to take your warfarin dose     To take:  3.75 mg Take 0.5 of a 7.5 mg tablet.    To take:  7.5 mg  Take 1 of the 7.5 mg tablets.    To take:  11.25 mg Take 1.5 of the 7.5 mg tablets.

## 2018-10-09 NOTE — TELEPHONE ENCOUNTER
Requested Prescriptions   Pending Prescriptions Disp Refills     VICTOZA PEN 18 MG/3ML soln [Pharmacy Med Name: VICTOZA 18MG/3ML SOPN]  Last Written Prescription Date:  6/29/18  Last Fill Quantity: 18 ML,  # refills: 0   Last office visit: 6/29/2018 with prescribing provider:  CAIT   Future Office Visit:   Next 5 appointments (look out 90 days)     Oct 30, 2018  9:20 AM CDT   SHORT with Ugo Delgado MD   [facility]    [address]  [facility] [address]   [phone]            Nov 07, 2018 10:15 AM CST   Return Visit with Joseph Nicolas MD   [facility] ([facility])    [address] [place] [address] [phone] OPT 2                  18 mL 0     Sig: INJECT 1.2MG UNDER THE SKIN ONCE DAILY    GLP-1 Agonists Protocol Failed    10/8/2018  2:39 PM       Failed - Microalbumin on file in past 12 months    Recent Labs   Lab Test  08/10/16   1122   MICROL  28   UMALCR  12.97            Failed - Normal serum creatinine on file in past 12 months    Recent Labs   Lab Test  06/28/18   0941   CR  1.40*            Passed - Blood pressure less than 140/90 in past 6 months    BP Readings from Last 3 Encounters:   06/29/18 108/60   05/24/18 112/63   08/25/17 92/59                Passed - LDL on file in past 12 months    Recent Labs   Lab Test  06/28/18   0941   LDL  Cannot estimate LDL when triglyceride exceeds 400 mg/dL  87            Passed - HgbA1C in past 3 or 6 months    If HgbA1C is 8 or greater, it needs to be on file within the past 3 months.  If less than 8, must be on file within the past 6 months.     Recent Labs   Lab Test  06/28/18   0941   A1C  6.1*            Passed - Patient is age 18 or older       Passed - Recent (6 mo) or future (30 days) visit within the authorizing provider's specialty    Patient had office visit in the last 6 months or has a visit in the next 30 days with  "authorizing provider.  See \"Patient Info\" tab in inbasket, or \"Choose Columns\" in Meds & Orders section of the refill encounter.              "

## 2018-10-11 RX ORDER — LIRAGLUTIDE 6 MG/ML
INJECTION SUBCUTANEOUS
Qty: 18 ML | Refills: 0 | Status: SHIPPED | OUTPATIENT
Start: 2018-10-11 | End: 2019-01-12

## 2018-10-13 ENCOUNTER — TELEPHONE (OUTPATIENT)
Dept: ANTICOAGULATION | Facility: CLINIC | Age: 63
End: 2018-10-13

## 2018-10-13 DIAGNOSIS — D68.51 FACTOR 5 LEIDEN MUTATION, HETEROZYGOUS (H): Primary | ICD-10-CM

## 2018-10-13 DIAGNOSIS — D73.5 SPLENIC INFARCTION: ICD-10-CM

## 2018-10-13 NOTE — TELEPHONE ENCOUNTER
For insurance purposes, an annual INR referral is required. Please complete and sign the order then route back to [facility]. Yumiko Storey RN

## 2018-11-04 DIAGNOSIS — N18.30 TYPE 2 DIABETES MELLITUS WITH STAGE 3 CHRONIC KIDNEY DISEASE, WITHOUT LONG-TERM CURRENT USE OF INSULIN (H): ICD-10-CM

## 2018-11-04 DIAGNOSIS — E11.22 TYPE 2 DIABETES MELLITUS WITH STAGE 3 CHRONIC KIDNEY DISEASE, WITHOUT LONG-TERM CURRENT USE OF INSULIN (H): ICD-10-CM

## 2018-11-05 DIAGNOSIS — E78.5 HYPERLIPIDEMIA WITH TARGET LDL LESS THAN 100: ICD-10-CM

## 2018-11-05 DIAGNOSIS — I25.10 CORONARY ARTERY DISEASE INVOLVING NATIVE CORONARY ARTERY WITHOUT ANGINA PECTORIS: ICD-10-CM

## 2018-11-05 RX ORDER — NIACIN 500 MG/1
500 TABLET, EXTENDED RELEASE ORAL AT BEDTIME
Qty: 90 TABLET | Refills: 2 | Status: SHIPPED | OUTPATIENT
Start: 2018-11-05 | End: 2019-08-19

## 2018-11-05 NOTE — TELEPHONE ENCOUNTER
"Requested Prescriptions   Pending Prescriptions Disp Refills     B-D U/F 31G X 8 MM insulin pen needle [Pharmacy Med Name: B-D PEN NDL SHRT [id number](5/16) KADE]  Last Written Prescription Date:  9/15/2018  Last Fill Quantity: 100,  # refills: 1   Last office visit: 6/29/2018 with prescribing provider:     Future Office Visit:   Next 5 appointments (look out 90 days)     Nov 07, 2018 10:15 AM CST   Return Visit with Joseph Nicolas MD   [facility] ([facility])    [address] [place] [address] [phone] OPT 2            Nov 28, 2018  1:20 PM CST   SHORT with Ugo Delgado MD   [facility]    [address]  [facility] [address] [phone]                100 each 0     Sig: USE AS DIRECTED    Diabetic Supplies Protocol Passed    11/4/2018  3:50 PM       Passed - Patient is 18 years of age or older       Passed - Recent (6 mo) or future (30 days) visit within the authorizing provider's specialty    Patient had office visit in the last 6 months or has a visit in the next 30 days with authorizing provider.  See \"Patient Info\" tab in inbasket, or \"Choose Columns\" in Meds & Orders section of the refill encounter.            ONETOUCH ULTRA test strip [Pharmacy Med Name: ONE TOUCH ULTRA BLUE TESTST(NEW)100]  Last Written Prescription Date:  6/29/2018  Last Fill Quantity: 100,  # refills: 1   Last office visit: 6/29/2018 with prescribing provider:     Future Office Visit:   Next 5 appointments (look out 90 days)     Nov 07, 2018 10:15 AM CST   Return Visit with Joseph Nicolas MD   [facility] ([facility])    [address] [place] [id number] [phone] OPT 2            Nov 28, 2018  1:20 PM CST   SHORT with Ugo Delgado MD   [facility] ([facility])    [address]  [place] [address]   [phone]                100 strip 0     Sig: USE TO TEST ONCE DAILY    Diabetic Supplies Protocol Passed    11/4/2018  3:50 PM       Passed - Patient is 18 years of age or older       Passed - Recent (6 mo) or future (30 days) visit within the authorizing provider's specialty    Patient had office visit in the last 6 months or has a visit in the next 30 days with authorizing provider.  See \"Patient Info\" tab in inbasket, or \"Choose Columns\" in Meds & Orders section of the refill encounter.            ONETOUCH DELICA LANCETS 33G MISC [Pharmacy Med Name: ONE TOUCH DELICA LANCETS 33G 100S]  Last Written Prescription Date:  7/23/2018  Last Fill Quantity: 100,  # refills: 3   Last office visit: 6/29/2018 with prescribing provider:     Future Office Visit:   Next 5 appointments (look out 90 days)     Nov 07, 2018 10:15 AM CST   Return Visit with Joseph Nicolas MD   [facility] ([address] [address]   [phone] OPT 2            Nov 28, 2018  1:20 PM CST   SHORT with Ugo Delgado MD   [facility]    [facility] [facility] [address]   [phone]                100 each 0     Sig: USE TO TEST ONCE DAILY    Diabetic Supplies Protocol Passed    11/4/2018  3:50 PM       Passed - Patient is 18 years of age or older       Passed - Recent (6 mo) or future (30 days) visit within the authorizing provider's specialty    Patient had office visit in the last 6 months or has a visit in the next 30 days with authorizing provider.  See \"Patient Info\" tab in inbasket, or \"Choose Columns\" in Meds & Orders section of the refill encounter.            "

## 2018-11-07 RX ORDER — LANCETS 33 GAUGE
EACH MISCELLANEOUS
Qty: 100 EACH | Refills: 0 | Status: SHIPPED | OUTPATIENT
Start: 2018-11-07 | End: 2018-12-12

## 2018-11-07 RX ORDER — PEN NEEDLE, DIABETIC 31 GX5/16"
NEEDLE, DISPOSABLE MISCELLANEOUS
Qty: 100 EACH | Refills: 0 | Status: SHIPPED | OUTPATIENT
Start: 2018-11-07 | End: 2018-12-12

## 2018-12-12 DIAGNOSIS — E11.22 TYPE 2 DIABETES MELLITUS WITH STAGE 3 CHRONIC KIDNEY DISEASE, WITHOUT LONG-TERM CURRENT USE OF INSULIN (H): ICD-10-CM

## 2018-12-12 DIAGNOSIS — N18.30 TYPE 2 DIABETES MELLITUS WITH STAGE 3 CHRONIC KIDNEY DISEASE, WITHOUT LONG-TERM CURRENT USE OF INSULIN (H): ICD-10-CM

## 2018-12-12 NOTE — TELEPHONE ENCOUNTER
"Requested Prescriptions   Pending Prescriptions Disp Refills     ONETOUCH DELICA LANCETS 33G MISC [Pharmacy Med Name: ONE TOUCH DELICA LANCETS 33G 100S] 100 each 0    Last Written Prescription Date:  11/07/2018  Last Fill Quantity: 100,  # refills: 0   Last office visit: 6/29/2018 with prescribing provider:     Future Office Visit:   Next 5 appointments (look out 90 days)    Jan 02, 2019  9:45 AM CST  Return Visit with Joseph Nicolas MD  [facility] ([facility]) [address] [place] [address]  [phone] OPT 2   Jan 03, 2019  3:00 PM CST  SHORT with Ugo Delgado MD  [facility] [address]  [facility] [id number] [phone]        Sig: USE TO TEST ONCE DAILY    Diabetic Supplies Protocol Passed - 12/12/2018  3:36 AM       Passed - Patient is 18 years of age or older       Passed - Recent (6 mo) or future (30 days) visit within the authorizing provider's specialty    Patient had office visit in the last 6 months or has a visit in the next 30 days with authorizing provider.  See \"Patient Info\" tab in inbasket, or \"Choose Columns\" in Meds & Orders section of the refill encounter.            B-D U/F 31G X 8 MM insulin pen needle [Pharmacy Med Name: B-D PEN NDL SHRT [id number](5/16) KADE] 100 each 0    Last Written Prescription Date:  11/07/2018  Last Fill Quantity: 100,  # refills: 0   Last office visit: 6/29/2018 with prescribing provider:     Future Office Visit:   Next 5 appointments (look out 90 days)    Jan 02, 2019  9:45 AM CST  Return Visit with Joseph Nicolas MD  [facility] ([facility]) [address] [place] [address] [phone] OPT 2   Jan 03, 2019  3:00 PM CST  SHORT with Ugo Delgado MD  [facility] ([facility]) [address]  [facility] [id number] [phone] " "       Sig: USE AS DIRECTED    Diabetic Supplies Protocol Passed - 12/12/2018  3:36 AM       Passed - Patient is 18 years of age or older       Passed - Recent (6 mo) or future (30 days) visit within the authorizing provider's specialty    Patient had office visit in the last 6 months or has a visit in the next 30 days with authorizing provider.  See \"Patient Info\" tab in inbasket, or \"Choose Columns\" in Meds & Orders section of the refill encounter.            "

## 2018-12-13 RX ORDER — LANCETS 33 GAUGE
EACH MISCELLANEOUS
Qty: 100 EACH | Refills: 0 | Status: SHIPPED | OUTPATIENT
Start: 2018-12-13 | End: 2019-01-12

## 2018-12-13 RX ORDER — PEN NEEDLE, DIABETIC 31 GX5/16"
NEEDLE, DISPOSABLE MISCELLANEOUS
Qty: 100 EACH | Refills: 0 | Status: SHIPPED | OUTPATIENT
Start: 2018-12-13 | End: 2019-01-12

## 2018-12-13 NOTE — TELEPHONE ENCOUNTER
Prescription approved per [facility] Refill Protocol.  Kaitlin Lassiter RN

## 2019-01-12 DIAGNOSIS — E11.22 TYPE 2 DIABETES MELLITUS WITH STAGE 3 CHRONIC KIDNEY DISEASE, WITHOUT LONG-TERM CURRENT USE OF INSULIN (H): ICD-10-CM

## 2019-01-12 DIAGNOSIS — N18.30 TYPE 2 DIABETES MELLITUS WITH STAGE 3 CHRONIC KIDNEY DISEASE, WITHOUT LONG-TERM CURRENT USE OF INSULIN (H): ICD-10-CM

## 2019-01-14 RX ORDER — LIRAGLUTIDE 6 MG/ML
INJECTION SUBCUTANEOUS
Qty: 12 ML | Refills: 0 | Status: SHIPPED | OUTPATIENT
Start: 2019-01-14 | End: 2019-03-11

## 2019-01-14 RX ORDER — LANCETS 33 GAUGE
EACH MISCELLANEOUS
Qty: 100 EACH | Refills: 0 | Status: SHIPPED | OUTPATIENT
Start: 2019-01-14 | End: 2020-02-26

## 2019-01-14 RX ORDER — PEN NEEDLE, DIABETIC 31 GX5/16"
NEEDLE, DISPOSABLE MISCELLANEOUS
Qty: 100 EACH | Refills: 0 | Status: SHIPPED | OUTPATIENT
Start: 2019-01-14

## 2019-01-14 NOTE — TELEPHONE ENCOUNTER
"Requested Prescriptions   Pending Prescriptions Disp Refills     VICTOZA PEN 18 MG/3ML soln [Pharmacy Med Name: VICTOZA 18MG/3ML INJ PEN 3ML] 12 mL 0    Last Written Prescription Date:  10/11/2018  Last Fill Quantity: 18 mL,  # refills: 0   Last office visit: 6/29/2018 with prescribing provider:     Future Office Visit:   Next 5 appointments (look out 90 days)    Feb 21, 2019  4:00 PM CST  SHORT with Ugo Delgado MD  [facility] [address]  [facility] [address]  [phone]   Feb 28, 2019  3:45 PM CST  Return Visit with Joseph Nicolas MD  [facility] ([facility]) [address] [place] [address] [phone] OPT 2        Sig: INJECT 1.2 MG UNDER THE SKIN ONCE DAILY    GLP-1 Agonists Protocol Failed - 1/12/2019  3:36 AM       Failed - Blood pressure less than 140/90 in past 6 months    BP Readings from Last 3 Encounters:   06/29/18 108/60   05/24/18 112/63   08/25/17 92/59                Failed - Microalbumin on file in past 12 months    Recent Labs   Lab Test 08/10/16  1122   MICROL 28   UMALCR 12.97            Failed - HgbA1C in past 3 or 6 months    If HgbA1C is 8 or greater, it needs to be on file within the past 3 months.  If less than 8, must be on file within the past 6 months.     Recent Labs   Lab Test 06/28/18  0941   A1C 6.1*            Failed - Normal serum creatinine on file in past 12 months    Recent Labs   Lab Test 06/28/18  0941   CR 1.40*            Failed - Recent (6 mo) or future (30 days) visit within the authorizing provider's specialty    Patient had office visit in the last 6 months or has a visit in the next 30 days with authorizing provider.  See \"Patient Info\" tab in inbasket, or \"Choose Columns\" in Meds & Orders section of the refill encounter.           Passed - LDL on file in past 12 months    Recent Labs   Lab Test 06/28/18  0941   LDL Cannot estimate LDL " "when triglyceride exceeds 400 mg/dL  87            Passed - Medication is active on med list       Passed - Patient is age 18 or older        B-D U/F 31G X 8 MM insulin pen needle [Pharmacy Med Name: B-D PEN NDL SHRT 83MG4GO(5/16) KADE] 100 each 0    Last Written Prescription Date:  12/13/2018  Last Fill Quantity: 100,  # refills: 0   Last office visit: 6/29/2018 with prescribing provider:     Future Office Visit:   Next 5 appointments (look out 90 days)    Feb 21, 2019  4:00 PM CST  SHORT with Ugo Delgado MD  [facility] [address]  [facility] [address]  [phone]   Feb 28, 2019  3:45 PM CST  Return Visit with Joseph Nicolas MD  [facility] ([address] [phone] [phone] OPT 2        Sig: USE AS DIRECTED    Diabetic Supplies Protocol Failed - 1/12/2019  3:36 AM       Failed - Recent (6 mo) or future (30 days) visit within the authorizing provider's specialty    Patient had office visit in the last 6 months or has a visit in the next 30 days with authorizing provider.  See \"Patient Info\" tab in inbasket, or \"Choose Columns\" in Meds & Orders section of the refill encounter.           Passed - Medication is active on med list       Passed - Patient is 18 years of age or older        ONETOUCH DELICA LANCETS 33G MISC [Pharmacy Med Name: ONE TOUCH DELICA LANCETS 33G 100S] 100 each 0    Last Written Prescription Date:  12/13/2018  Last Fill Quantity: 100,  # refills: 0   Last office visit: 6/29/2018 with prescribing provider:     Future Office Visit:   Next 5 appointments (look out 90 days)    Feb 21, 2019  4:00 PM CST  SHORT with Ugo Delgado MD  [facility] ([facility]) [address]  [facility] [address]  [phone]   Feb 28, 2019  3:45 PM CST  Return Visit with Joseph Nicolas MD  [facility] " "[facility] ([facility]) [address] [place] [id number] [phone] OPT 2        Sig: USE TO TEST ONCE DAILY    Diabetic Supplies Protocol Failed - 1/12/2019  3:36 AM       Failed - Recent (6 mo) or future (30 days) visit within the authorizing provider's specialty    Patient had office visit in the last 6 months or has a visit in the next 30 days with authorizing provider.  See \"Patient Info\" tab in inbasket, or \"Choose Columns\" in Meds & Orders section of the refill encounter.           Passed - Medication is active on med list       Passed - Patient is 18 years of age or older        metFORMIN (GLUCOPHAGE) 500 MG tablet [Pharmacy Med Name: METFORMIN 500MG TABLETS] 60 tablet 0    Last Written Prescription Date:  06/29/2018  Last Fill Quantity: 180,  # refills: 1   Last office visit: 6/29/2018 with prescribing provider:     Future Office Visit:   Next 5 appointments (look out 90 days)    Feb 21, 2019  4:00 PM CST  SHORT with gUo Delgado MD  [facility] ([facility]) [address]  [facility] [address]  [phone]   Feb 28, 2019  3:45 PM CST  Return Visit with Joseph Nicolas MD  [facility] ([facility]) [address] [place] [address] [phone] OPT 2        Sig: TAKE 1 TABLET BY MOUTH TWICE DAILY WITH MEALS    Biguanide Agents Failed - 1/12/2019  3:36 AM       Failed - Blood pressure less than 140/90 in past 6 months    BP Readings from Last 3 Encounters:   06/29/18 108/60   05/24/18 112/63   08/25/17 92/59                Failed - Patient has had a Microalbumin in the past 15 mos.    Recent Labs   Lab Test 08/10/16  1122   MICROL 28   UMALCR 12.97            Failed - Patient has documented A1c within the specified period of time.    If HgbA1C is 8 or greater, it needs to be on file within the past 3 months.  If less than 8, must be on file within " "the past 6 months.     Recent Labs   Lab Test 06/28/18  0941   A1C 6.1*            Failed - Recent (6 mo) or future (30 days) visit within the authorizing provider's specialty    Patient had office visit in the last 6 months or has a visit in the next 30 days with authorizing provider or within the authorizing provider's specialty.  See \"Patient Info\" tab in inbasket, or \"Choose Columns\" in Meds & Orders section of the refill encounter.           Passed - Patient has documented LDL within the past 12 mos.    Recent Labs   Lab Test 06/28/18  0941   LDL Cannot estimate LDL when triglyceride exceeds 400 mg/dL  87            Passed - Patient is age 10 or older       Passed - Patient's CR is NOT>1.4 OR Patient's EGFR is NOT<45 within past 12 mos.    Recent Labs   Lab Test 06/28/18  0941   GFRESTIMATED 52*   GFRESTBLACK 62       Recent Labs   Lab Test 06/28/18 0941   CR 1.40*            Passed - Patient does NOT have a diagnosis of CHF.       Passed - Medication is active on med list        "

## 2019-01-18 ENCOUNTER — TELEPHONE (OUTPATIENT)
Dept: INTERNAL MEDICINE | Facility: CLINIC | Age: 64
End: 2019-01-18

## 2019-01-18 NOTE — TELEPHONE ENCOUNTER
Panel Management Review      Patient has the following on his problem list:     Diabetes    ASA: Failed    Last A1C  Lab Results   Component Value Date    A1C 6.1 06/28/2018    A1C 6.0 08/24/2017    A1C 6.1 08/10/2016    A1C 5.9 10/08/2015    A1C 6.0 03/24/2015     A1C tested: Passed    Last LDL:    Lab Results   Component Value Date    CHOL 150 06/28/2018     Lab Results   Component Value Date    HDL 21 06/28/2018     Lab Results   Component Value Date    LDL  06/28/2018     Cannot estimate LDL when triglyceride exceeds 400 mg/dL    LDL 87 06/28/2018     Lab Results   Component Value Date    TRIG 416 06/28/2018     Lab Results   Component Value Date    CHOLHDLRATIO 5.5 10/08/2015     Lab Results   Component Value Date    NHDL 129 06/28/2018       Is the patient on a Statin? YES             Is the patient on Aspirin? NO    Medications     HMG CoA Reductase Inhibitors    atorvastatin (LIPITOR) 20 MG tablet          Last three blood pressure readings:  BP Readings from Last 3 Encounters:   06/29/18 108/60   05/24/18 112/63   08/25/17 92/59       Date of last diabetes office visit: 6/28/2018     Tobacco History:     History   Smoking Status     Current Every Day Smoker     Packs/day: 0.50   Smokeless Tobacco     Never Used           IVD   ASA: FAILED    Last LDL:    Lab Results   Component Value Date    CHOL 150 06/28/2018     Lab Results   Component Value Date    HDL 21 06/28/2018     Lab Results   Component Value Date    LDL  06/28/2018     Cannot estimate LDL when triglyceride exceeds 400 mg/dL    LDL 87 06/28/2018     Lab Results   Component Value Date    TRIG 416 06/28/2018        Lab Results   Component Value Date    CHOLHDLRATIO 5.5 10/08/2015        Is the patient on a Statin? YES   Is the patient on Aspirin? NO                  Medications     HMG CoA Reductase Inhibitors    atorvastatin (LIPITOR) 20 MG tablet          Last three blood pressure readings:  BP Readings from Last 3 Encounters:   06/29/18 108/60    05/24/18 112/63   08/25/17 92/59        Tobacco History:     History   Smoking Status     Current Every Day Smoker     Packs/day: 0.50   Smokeless Tobacco     Never Used       Hypertension   Last three blood pressure readings:  BP Readings from Last 3 Encounters:   06/29/18 108/60   05/24/18 112/63   08/25/17 92/59     Blood pressure: Passed    HTN Guidelines:  Age 18-59 BP range:  Less than 140/90  Age 60-85 with Diabetes:  Less than 140/90  Age 60-85 without Diabetes:  less than 150/90      Composite cancer screening  Chart review shows that this patient is due/due soon for the following Colonoscopy and Fecal Colorectal (FIT)  Summary:    Patient is due/failing the following:   COLONOSCOPY and FIT    Action needed:   Colonoscopy/FIT    Type of outreach:    Sent letter.    Questions for provider review:    None                                                                                                                                    Cristal TEAGUE       Chart routed to Cristal TEAGUE

## 2019-01-18 NOTE — LETTER
[facility]  [address], [address]  [place], [place]  [address]                                            TEL:[phone]  FAX:[phone]      Osvaldo Parry  [address] [place] [address]      February 1, 2019    Dear Osvaldo,         At [facility] we care about your health and well-being.  A review of your chart has indicated that you are overdue for a colon cancer screening. If you have already had a colonoscopy within the past 10 years, please request a copy be sent from the performing physician.  If screening has not been done, a colonoscopy is the best test and is done every 10 years.  The alternate option is stool testing for blood, FIT test, done yearly.  Please contact our office at [phone] to indicate which testing you prefer.           Sincerely,      Ugo Delgado M.D.

## 2019-01-18 NOTE — LETTER
[facility]  [address], [address]  [place], [place]  [address]                                            TEL:[phone]  FAX:[phone]      Osvaldo Parry  [address] [facility] [address]      January 18, 2019    Dear Osvaldo,       At [facility] we care about your health and well-being.  A review of your chart has indicated that you are overdue for a colon cancer screening. If you have already had a colonoscopy within the past 10 years, please request a copy be sent from the performing physician.  If screening has not been done, a colonoscopy is the best test and is done every 10 years.  The alternate option is stool testing for blood, FIT test, done yearly.  Please contact our office at [phone] to indicate which testing you prefer.           Sincerely,      Ugo Delgado M.D.

## 2019-01-22 DIAGNOSIS — D73.5 SPLENIC INFARCTION: ICD-10-CM

## 2019-01-22 RX ORDER — WARFARIN SODIUM 7.5 MG/1
TABLET ORAL
Qty: 24 TABLET | Refills: 0 | Status: SHIPPED | OUTPATIENT
Start: 2019-01-22 | End: 2019-05-17

## 2019-01-22 NOTE — TELEPHONE ENCOUNTER
"Requested Prescriptions   Pending Prescriptions Disp Refills     warfarin (COUMADIN) 7.5 MG tablet [Pharmacy Med Name: WARFARIN SOD 7.5MG TABLETS (YELLOW)]  Last Written Prescription Date:  10/8/2018  Last Fill Quantity: 75,  # refills: 0   Last office visit: 6/29/2018 with prescribing provider:     Future Office Visit:   Next 5 appointments (look out 90 days)    Feb 21, 2019  4:00 PM CST  SHORT with Ugo Delgado MD  [facility] ([facility]) [address]  [facility] [id number] [phone]   Feb 28, 2019  3:45 PM CST  Return Visit with Joseph Nicolas MD  [facility] ([facility]) [address] [place] [phone] [phone] OPT 2         Requested Prescriptions   Pending Prescriptions Disp Refills     53 tablet 0     Sig: TAKE ONE-HALF TABLET BY MOUTH ON TUESDAY, THURSDAY, AND SUNDAY. TAKE 1 TABLET ALL OTHER DAYS OR AS DIRECTED    Vitamin K Antagonists Failed - 1/22/2019  3:35 AM       Failed - INR is within goal in the past 6 weeks    Confirm INR is within goal in the past 6 weeks.     Recent Labs   Lab Test 10/08/18   INR 1.7*                      Passed - Recent (12 mo) or future (30 days) visit within the authorizing provider's specialty    Patient had office visit in the last 12 months or has a visit in the next 30 days with authorizing provider or within the authorizing provider's specialty.  See \"Patient Info\" tab in inbasket, or \"Choose Columns\" in Meds & Orders section of the refill encounter.             Passed - Medication is active on med list       Passed - Patient is 18 years of age or older       53 tablet 0     Sig: TAKE ONE-HALF TABLET BY MOUTH ON TUESDAY, THURSDAY, AND SUNDAY. TAKE 1 TABLET ALL OTHER DAYS OR AS DIRECTED    Vitamin K Antagonists Failed - 1/22/2019  3:35 AM       Failed - INR is within goal in the past 6 weeks    Confirm INR is within goal in the past 6 weeks. " "    Recent Labs   Lab Test 10/08/18   INR 1.7*                      Passed - Recent (12 mo) or future (30 days) visit within the authorizing provider's specialty    Patient had office visit in the last 12 months or has a visit in the next 30 days with authorizing provider or within the authorizing provider's specialty.  See \"Patient Info\" tab in inbasket, or \"Choose Columns\" in Meds & Orders section of the refill encounter.             Passed - Medication is active on med list       Passed - Patient is 18 years of age or older        "

## 2019-01-22 NOTE — TELEPHONE ENCOUNTER
Pt last had INR checked 10/8/18. Called pt, advised it is not safe to refill the warfarin with the current dosing instruction since his last INR was 1.7. INR appt scheduled for 1/27/19. Pt advised a 30 day supply will be sent to pharmacy.    Medication is being filled for 1 time refill only due to:  Pt has not had INR checked since 10/8/18 and it was low at that time.

## 2019-02-10 DIAGNOSIS — E11.22 TYPE 2 DIABETES MELLITUS WITH STAGE 3 CHRONIC KIDNEY DISEASE, WITHOUT LONG-TERM CURRENT USE OF INSULIN (H): ICD-10-CM

## 2019-02-10 DIAGNOSIS — N18.30 TYPE 2 DIABETES MELLITUS WITH STAGE 3 CHRONIC KIDNEY DISEASE, WITHOUT LONG-TERM CURRENT USE OF INSULIN (H): ICD-10-CM

## 2019-02-11 NOTE — TELEPHONE ENCOUNTER
Requested Prescriptions   Pending Prescriptions Disp Refills     metFORMIN (GLUCOPHAGE) 500 MG tablet [Pharmacy Med Name: METFORMIN 500MG TABLETS] 60 tablet 0    Last Written Prescription Date:  01/14/2019  Last Fill Quantity: 60,  # refills: 0   Last office visit: 6/29/2018 with prescribing provider:     Future Office Visit:   Next 5 appointments (look out 90 days)    Feb 21, 2019  4:00 PM CST  SHORT with Ugo Delgado MD  [facility] ([facility]) [address]  [facility] [address]  [phone]   Feb 28, 2019  3:45 PM CST  Return Visit with Joseph Nicolas MD  [facility] [address] [place] [address] [address]  [facility] [address] [phone] OPT 2        Sig: TAKE 1 TABLET BY MOUTH TWICE DAILY WITH MEALS    Biguanide Agents Failed - 2/10/2019  3:35 AM       Failed - Blood pressure less than 140/90 in past 6 months    BP Readings from Last 3 Encounters:   06/29/18 108/60   05/24/18 112/63   08/25/17 92/59                Failed - Patient has had a Microalbumin in the past 15 mos.    Recent Labs   Lab Test 08/10/16  1122   MICROL 28   UMALCR 12.97            Failed - Patient has documented A1c within the specified period of time.    If HgbA1C is 8 or greater, it needs to be on file within the past 3 months.  If less than 8, must be on file within the past 6 months.     Recent Labs   Lab Test 06/28/18 0941   A1C 6.1*            Passed - Patient has documented LDL within the past 12 mos.    Recent Labs   Lab Test 06/28/18 0941   LDL Cannot estimate LDL when triglyceride exceeds 400 mg/dL  87            Passed - Patient is age 10 or older       Passed - Patient's CR is NOT>1.4 OR Patient's EGFR is NOT<45 within past 12 mos.    Recent Labs   Lab Test 06/28/18 0941   GFRESTIMATED 52*   GFRESTBLACK 62       Recent Labs   Lab Test 06/28/18 0941   CR 1.40*            Passed - Patient does NOT have a diagnosis of CHF.  "      Passed - Medication is active on med list       Passed - Recent (6 mo) or future (30 days) visit within the authorizing provider's specialty    Patient had office visit in the last 6 months or has a visit in the next 30 days with authorizing provider or within the authorizing provider's specialty.  See \"Patient Info\" tab in inbasket, or \"Choose Columns\" in Meds & Orders section of the refill encounter.            "

## 2019-02-11 NOTE — TELEPHONE ENCOUNTER
"Requested Prescriptions   Pending Prescriptions Disp Refills     B-D U/F 31G X 8 MM insulin pen needle [Pharmacy Med Name: B-D PEN NDL SHRT 86GS4SL(5/16) KADE] 100 each 0    Last Written Prescription Date:  01/14/2019  Last Fill Quantity: 100,  # refills: 0   Last office visit: 6/29/2018 with prescribing provider:     Future Office Visit:   Next 5 appointments (look out 90 days)    Feb 21, 2019  4:00 PM CST  SHORT with Ugo Delgado MD  [facility] ([facility]) [address]  [facility] [address]  [phone]   Feb 28, 2019  3:45 PM CST  Return Visit with Joseph Nicolas MD  [facility] ([facility]) [address] [place] [address] [phone] OPT 2        Sig: USE AS DIRECTED    Diabetic Supplies Protocol Passed - 2/10/2019  3:36 AM       Passed - Medication is active on med list       Passed - Patient is 18 years of age or older       Passed - Recent (6 mo) or future (30 days) visit within the authorizing provider's specialty    Patient had office visit in the last 6 months or has a visit in the next 30 days with authorizing provider.  See \"Patient Info\" tab in inbasket, or \"Choose Columns\" in Meds & Orders section of the refill encounter.            ONETOUCH DELICA LANCETS 33G MISC [Pharmacy Med Name: ONE TOUCH DELICA LANCETS 33G 100S] 100 each 0    Last Written Prescription Date:  01/14/2019  Last Fill Quantity: 100,  # refills: 0   Last office visit: 6/29/2018 with prescribing provider:     Future Office Visit:   Next 5 appointments (look out 90 days)    Feb 21, 2019  4:00 PM CST  SHORT with Ugo Delgado MD  [facility] ([facility]) [address]  [facility] [address]  [phone]   Feb 28, 2019  3:45 PM CST  Return Visit with Joseph Nicolas MD  [facility] ([facility]) [address] [place] [address]" "[address]  [place] [address]  [phone] OPT 2        Sig: USE TO TEST ONCE DAILY    Diabetic Supplies Protocol Passed - 2/10/2019  3:36 AM       Passed - Medication is active on med list       Passed - Patient is 18 years of age or older       Passed - Recent (6 mo) or future (30 days) visit within the authorizing provider's specialty    Patient had office visit in the last 6 months or has a visit in the next 30 days with authorizing provider.  See \"Patient Info\" tab in inbasket, or \"Choose Columns\" in Meds & Orders section of the refill encounter.            "

## 2019-02-12 ENCOUNTER — TELEPHONE (OUTPATIENT)
Dept: INTERNAL MEDICINE | Facility: CLINIC | Age: 64
End: 2019-02-12

## 2019-02-12 RX ORDER — LANCETS 33 GAUGE
EACH MISCELLANEOUS
Qty: 100 EACH | Refills: 0 | OUTPATIENT
Start: 2019-02-12

## 2019-02-12 RX ORDER — PEN NEEDLE, DIABETIC 31 GX5/16"
NEEDLE, DISPOSABLE MISCELLANEOUS
Qty: 100 EACH | Refills: 0 | OUTPATIENT
Start: 2019-02-12

## 2019-02-12 NOTE — TELEPHONE ENCOUNTER
Medication is being filled for 1 time refill only due to:  Patient needs to be seen because due for diabetic f/u - scheduled.

## 2019-02-12 NOTE — TELEPHONE ENCOUNTER
Panel Management Review      Patient has the following on his problem list:     Diabetes    ASA: Failed    Last A1C  Lab Results   Component Value Date    A1C 6.1 06/28/2018    A1C 6.0 08/24/2017    A1C 6.1 08/10/2016    A1C 5.9 10/08/2015    A1C 6.0 03/24/2015     A1C tested: Passed    Last LDL:    Lab Results   Component Value Date    CHOL 150 06/28/2018     Lab Results   Component Value Date    HDL 21 06/28/2018     Lab Results   Component Value Date    LDL  06/28/2018     Cannot estimate LDL when triglyceride exceeds 400 mg/dL    LDL 87 06/28/2018     Lab Results   Component Value Date    TRIG 416 06/28/2018     Lab Results   Component Value Date    CHOLHDLRATIO 5.5 10/08/2015     Lab Results   Component Value Date    NHDL 129 06/28/2018       Is the patient on a Statin? YES             Is the patient on Aspirin? NO    Medications     HMG CoA Reductase Inhibitors    atorvastatin (LIPITOR) 20 MG tablet          Last three blood pressure readings:  BP Readings from Last 3 Encounters:   06/29/18 108/60   05/24/18 112/63   08/25/17 92/59       Date of last diabetes office visit: 6/29/2018     Tobacco History:     History   Smoking Status     Current Every Day Smoker     Packs/day: 0.50   Smokeless Tobacco     Never Used         Hypertension   Last three blood pressure readings:  BP Readings from Last 3 Encounters:   06/29/18 108/60   05/24/18 112/63   08/25/17 92/59     Blood pressure: Passed    HTN Guidelines:  Age 18-59 BP range:  Less than 140/90  Age 60-85 with Diabetes:  Less than 140/90  Age 60-85 without Diabetes:  less than 150/90      Composite cancer screening  Chart review shows that this patient is due/due soon for the following Colonoscopy and Fecal Colorectal (FIT)  Summary:    Patient is due/failing the following:   FOLLOW UP    Action needed:   Patient has appointment 2/21/2019 with PCP, will address FIT/Colonoscopy    Type of outreach:    None, patient has appt 2/21/2019    Questions for provider  review:    None                                                                                                                                    [facility]       Chart routed to closed .

## 2019-02-28 DIAGNOSIS — I71.20 ANEURYSM, AORTA, THORACIC (H): Primary | ICD-10-CM

## 2019-03-06 DIAGNOSIS — E11.22 TYPE 2 DIABETES MELLITUS WITH STAGE 3 CHRONIC KIDNEY DISEASE, WITHOUT LONG-TERM CURRENT USE OF INSULIN (H): ICD-10-CM

## 2019-03-06 DIAGNOSIS — N18.30 TYPE 2 DIABETES MELLITUS WITH STAGE 3 CHRONIC KIDNEY DISEASE, WITHOUT LONG-TERM CURRENT USE OF INSULIN (H): ICD-10-CM

## 2019-03-06 NOTE — TELEPHONE ENCOUNTER
"Requested Prescriptions   Pending Prescriptions Disp Refills     ONETOUCH ULTRA test strip [Pharmacy Med Name: ONE TOUCH ULTRA BLUE TEST STR 25'S] 100 strip 0    Last Written Prescription Date:  11/07/2018  Last Fill Quantity: 100 strip,  # refills: 0   Last office visit: No previous visit found with prescribing provider:     Future Office Visit:   Next 5 appointments (look out 90 days)    Apr 26, 2019 10:20 AM CDT  Office Visit with Ugo Delgado MD  [facility] ([facility]) [address]  [facility] [id number] [phone]        Sig: USE TO TEST ONCE DAILY    Diabetic Supplies Protocol Failed - 3/6/2019  3:35 AM       Failed - Recent (6 mo) or future (30 days) visit within the authorizing provider's specialty    Patient had office visit in the last 6 months or has a visit in the next 30 days with authorizing provider.  See \"Patient Info\" tab in inbasket, or \"Choose Columns\" in Meds & Orders section of the refill encounter.           Passed - Medication is active on med list       Passed - Patient is 18 years of age or older        "

## 2019-03-11 NOTE — TELEPHONE ENCOUNTER
Patient has upcoming appt 4/26/19 with primary care provider. Prescription approved per [facility] Refill Protocol. EMILY Eaton R.N.

## 2019-03-14 ENCOUNTER — HOSPITAL ENCOUNTER (EMERGENCY)
Facility: CLINIC | Age: 64
Discharge: HOME | End: 2019-03-14
Attending: EMERGENCY MEDICINE | Admitting: EMERGENCY MEDICINE
Payer: COMMERCIAL

## 2019-03-14 VITALS
BODY MASS INDEX: 25.67 KG/M2 | HEIGHT: 74 IN | SYSTOLIC BLOOD PRESSURE: 114 MMHG | DIASTOLIC BLOOD PRESSURE: 62 MMHG | RESPIRATION RATE: 16 BRPM | OXYGEN SATURATION: 100 % | TEMPERATURE: 97.8 F | WEIGHT: 200 LBS

## 2019-03-14 DIAGNOSIS — R79.1 SUBTHERAPEUTIC INTERNATIONAL NORMALIZED RATIO (INR): ICD-10-CM

## 2019-03-14 DIAGNOSIS — H57.9 EYE SYMPTOMS: ICD-10-CM

## 2019-03-14 DIAGNOSIS — L08.9 PUSTULE: ICD-10-CM

## 2019-03-14 LAB
ANION GAP SERPL CALCULATED.3IONS-SCNC: 8 MMOL/L (ref 3–14)
BUN SERPL-MCNC: 15 MG/DL (ref 7–30)
CALCIUM SERPL-MCNC: 8.5 MG/DL (ref 8.5–10.1)
CHLORIDE SERPL-SCNC: 108 MMOL/L (ref 94–109)
CO2 SERPL-SCNC: 23 MMOL/L (ref 20–32)
CREAT SERPL-MCNC: 1.26 MG/DL (ref 0.66–1.25)
GFR SERPL CREATININE-BSD FRML MDRD: 61 ML/MIN/{1.73_M2}
GLUCOSE SERPL-MCNC: 118 MG/DL (ref 70–99)
INR PPP: 1.77 (ref 0.86–1.14)
POTASSIUM SERPL-SCNC: 4.2 MMOL/L (ref 3.4–5.3)
SODIUM SERPL-SCNC: 139 MMOL/L (ref 133–144)

## 2019-03-14 PROCEDURE — 99283 EMERGENCY DEPT VISIT LOW MDM: CPT | Mod: 25

## 2019-03-14 PROCEDURE — 85610 PROTHROMBIN TIME: CPT | Performed by: EMERGENCY MEDICINE

## 2019-03-14 PROCEDURE — 80048 BASIC METABOLIC PNL TOTAL CA: CPT | Performed by: EMERGENCY MEDICINE

## 2019-03-14 PROCEDURE — 10060 I&D ABSCESS SIMPLE/SINGLE: CPT

## 2019-03-14 ASSESSMENT — MIFFLIN-ST. JEOR: SCORE: 1781.94

## 2019-03-14 NOTE — ED AVS SNAPSHOT
Emergency Department  [address] [place] [address]  Phone:  [phone]  Fax:  [phone]                                    Osvaldo Parry   MRN: [id number]    Department:   Emergency Department   Date of Visit:  3/14/2019           After Visit Summary Signature Page    I have received my discharge instructions, and my questions have been answered. I have discussed any challenges I see with this plan with the nurse or doctor.    ..........................................................................................................................................  Patient/Patient Representative Signature      ..........................................................................................................................................  Patient Representative Print Name and Relationship to Patient    ..................................................               ................................................  Date                                   Time    ..........................................................................................................................................  Reviewed by Signature/Title    ...................................................              ..............................................  Date                                               Time          22EPIC Rev 08/18

## 2019-03-14 NOTE — ED PROVIDER NOTES
"  History     Chief Complaint:  Nasal Swelling and pain    HPI   Osvaldo Parry is a 63 year old male on warfarin with a history of CKD, CAD, HLD, DM2, Factor V Leiden mutation who presents to the emergency department for evaluation of nasal swelling.  Just today he noticed some tender hardness to an area of his right nose.  He presents out of concern for sinus or facial infection due to this bump on his nose. The patient has not taken any medications for symptom management. He denies any recent fever. The patient last had his INR checked several years ago.  No prior similar symptoms in his nose.  He is on Coumadin but has not had his INR checked in several months.  He additionally reports intermittent right eye itching, redness, pain, and swelling for around 1 month, and wonders if he has an eye infection.  He denies any surgeries to his eye.  He has not yet seen an eye specialist for the symptoms.    Allergies:  Codeine Sulfate  Hydrocodone     Medications:    Albuterol  Lipitor  Lisinopril  Metformin  Metoprolol  Niacin  Nicorette  Viagra  Chantix  Victoza  Warfarin     Past Medical History:    Aortic root dilatation  CKD  CAD  Factor V Leiden mutation  HLD  Lymph nodes  RBBB  Splenic infarction  DM2    Past Surgical History:    The patient does not have any pertinent past surgical history.    Family History:    No past pertinent family history.    Social History:  Presents alone.  Current every day smoker, 0.5 ppd.  Negative for alcohol use.  Marital Status:  Single [1]     Review of Systems   All other systems reviewed and are negative.      Physical Exam     Patient Vitals for the past 24 hrs:   BP Temp Temp src Heart Rate Resp SpO2 Height Weight   03/14/19 1332 114/62 97.8  F (36.6  C) Oral 78 16 100 % 1.88 m (6' 2\") 90.7 kg (200 lb)     Physical Exam  General: nontoxic appearing male sitting upright in room 30  HENT: mucous membranes moist, L TM wnl, R TM wnl, OP clear, no tenderness to palpation over " maxillary or frontal sinuses  Subcentimeter pustule readily visible on the inner surface of the right nasal ala, which is tender and fluctuant  Nasal septum is normal.  Left nose is normal.  No palpable fluctuance or tenderness over the lacrimal ducts or other facial swelling.  Eye: PERRL, EOMI, no nystagmus, no scleral injection, eyelids wnl  CV: regular rate, regular rhythm  Resp: normal effort, clear throughout, no crackles or wheezing  GI: abdomen soft and nontender, no guarding  MSK: no bony tenderness to face  Skin: appropriately warm and dry, no neck crepitus  Neuro: alert, clear speech, oriented  Psych: cooperative      Emergency Department Course     Laboratory:  BMP: Glucose 118 (H), Creatinine 1.26 (H), o/w WNL     INR: 1.77 (H)    Procedures:  Drainage of nasal pustule - Procedure performed by John Paul Khan MD:  After verbal consent was obtained from the patient at bedside, I used an 18-gauge needle to penetrate the anteromedial aspect of the fluctuant nasal pustule, resulting in some purulent drainage.  This was sent for culture.  This caused scant bleeding that was easily controlled thereafter.  He tolerated this moderately well and there were no apparent complications.    Emergency Department Course:  Nursing notes and vitals reviewed. 1515 I performed an exam of the patient as documented above.     Blood drawn. This was sent to the lab for further testing, results above.    I performed electronic chart review in EPIC.    1650 I performed a drainage of the swelling on the patient's nose, details above. I discussed the results of his workup thus far.     Findings and plan explained to the Patient. Patient discharged home with instructions regarding supportive care, medications, and reasons to return. The importance of close follow-up was reviewed. The patient was prescribed Bactroban.    I personally reviewed the laboratory results with the Patient and answered all related questions prior to  discharge.    Impression & Plan      Medical Decision Making:  His primary concern is new tender fullness to his right nose, where he has an easily visible pustule that I felt was appropriate to drain at the bedside after discussion of these findings and acknowledging increased bleeding risk due to his Coumadin use, though he does happen to be subtherapeutic.  I am hopeful that this will be definitive treatment for this, while acknowledging to the patient some risk of recurrence.  This is not appear to be any necrotizing infection or anything involving deeper structures based on currently available information.  I think his nose issue is completely unrelated to his subacute eye symptoms, for which no etiology was readily apparent and I think he is safe for more definitive evaluation by an ophthalmologist.  Regarding his nose, referral information provided for an otolaryngologist for close outpatient follow-up.  He was discharged home in improved condition after discussion of the above.  Return here for sudden worsening at any hour..    Diagnosis:    ICD-10-CM    1. Pustule L08.9     inside R nare   2. Eye symptoms H57.9    3. Subtherapeutic international normalized ratio (INR) R79.1        Disposition:  discharged to home    Discharge Medications:     Medication List      Started    mupirocin 2 % nasal ointment  Commonly known as:  BACTROBAN  Apply a small amount inside your right nose twice daily for the next ten days.          I, Demian Huizar, am serving as a scribe on 3/14/2019 at 3:09 PM to personally document services performed by John Paul Khan, * based on my observations and the provider's statements to me.     This record was created at least in part using electronic voice recognition software, so please excuse any typographical errors.    Demian Huizar  3/14/2019    EMERGENCY DEPARTMENT       John Paul Khan MD  03/14/19 0818

## 2019-03-14 NOTE — DISCHARGE INSTRUCTIONS
Use the antibiotic ointment inside your nose as prescribed.  Be sure to follow up with an ENT specialist, and an Ophthalmologist regarding your eye symptoms.  You also need to have your INR followed closely by your primary care clinic.

## 2019-03-19 ENCOUNTER — TELEPHONE (OUTPATIENT)
Dept: INTERNAL MEDICINE | Facility: CLINIC | Age: 64
End: 2019-03-19

## 2019-03-19 NOTE — TELEPHONE ENCOUNTER
Panel Management Review      Patient has the following on his problem list:     Diabetes    ASA: Failed    Last A1C  Lab Results   Component Value Date    A1C 6.1 06/28/2018    A1C 6.0 08/24/2017    A1C 6.1 08/10/2016    A1C 5.9 10/08/2015    A1C 6.0 03/24/2015     A1C tested: Passed    Last LDL:    Lab Results   Component Value Date    CHOL 150 06/28/2018     Lab Results   Component Value Date    HDL 21 06/28/2018     Lab Results   Component Value Date    LDL  06/28/2018     Cannot estimate LDL when triglyceride exceeds 400 mg/dL    LDL 87 06/28/2018     Lab Results   Component Value Date    TRIG 416 06/28/2018     Lab Results   Component Value Date    CHOLHDLRATIO 5.5 10/08/2015     Lab Results   Component Value Date    NHDL 129 06/28/2018       Is the patient on a Statin? NO             Is the patient on Aspirin? NO    Medications     HMG CoA Reductase Inhibitors     atorvastatin (LIPITOR) 20 MG tablet             Last three blood pressure readings:  BP Readings from Last 3 Encounters:   03/14/19 114/62   06/29/18 108/60   05/24/18 112/63       Date of last diabetes office visit: 6/29/2018     Tobacco History:     History   Smoking Status     Current Every Day Smoker     Packs/day: 0.50   Smokeless Tobacco     Never Used           IVD   ASA: FAILED    Last LDL:    Lab Results   Component Value Date    CHOL 150 06/28/2018     Lab Results   Component Value Date    HDL 21 06/28/2018     Lab Results   Component Value Date    LDL  06/28/2018     Cannot estimate LDL when triglyceride exceeds 400 mg/dL    LDL 87 06/28/2018     Lab Results   Component Value Date    TRIG 416 06/28/2018        Lab Results   Component Value Date    CHOLHDLRATIO 5.5 10/08/2015        Is the patient on a Statin? NO   Is the patient on Aspirin? NO                  Medications     HMG CoA Reductase Inhibitors     atorvastatin (LIPITOR) 20 MG tablet             Last three blood pressure readings:  BP Readings from Last 3 Encounters:   03/14/19  114/62   06/29/18 108/60   05/24/18 112/63        Tobacco History:     History   Smoking Status     Current Every Day Smoker     Packs/day: 0.50   Smokeless Tobacco     Never Used         Composite cancer screening  Chart review shows that this patient is due/due soon for the following None  Summary:    Patient is due/failing the following:   FOLLOW UP    Action needed:   Patient needs office visit for follow up.    Type of outreach:    none, appointment scheduled 4/26/2019 with PCP    Questions for provider review:    None                                                                                                                                    Cristal TEAGUE       Chart routed to closed .

## 2019-07-03 ENCOUNTER — TELEPHONE (OUTPATIENT)
Dept: ANTICOAGULATION | Facility: CLINIC | Age: 64
End: 2019-07-03

## 2019-07-03 NOTE — LETTER
July 3, 2019      Osvaldo Parry  [address] [facility] [address]      Dear Osvaldo,    We are concerned about your Warfarin Therapy.  You have failed to have the necessary laboratory work required for monitoring your warfarin therapy and you have not responded to the Missed Appointment Letter we recently sent you.    We value you as a patient and seek to provide you with excellent quality of care.  We need to be notified if you have changed physicians or if you are being monitored elsewhere so we can update our files.    If we do not hear from you within 14 days of the date of this letter, we will assume that you no longer are requesting our services and will inactivate you from our warfarin monitoring service.  For any further laboratory work and warfarin dose adjustments you will need to contact your care provider for anticoagulation management.    We want to remind you that there are potential serious risks involved in stopping your warfarin or taking warfarin without careful monitoring.    It has also been more than a year since your last office visit with Dr. Delgado, so please schedule an appointment for your INR and your physical.    Please contact our office at [phone] with any information or questions you may have.    Sincerely,        Ugo Delgado MD, MD  INR Clinic

## 2019-07-08 NOTE — TELEPHONE ENCOUNTER
Pt calling wanting to inform Dr. Delgado that he has scheduled an INR and also a future lab and Dr's appointment with Dr. Delgado.

## 2019-07-14 DIAGNOSIS — E11.22 TYPE 2 DIABETES MELLITUS WITH STAGE 3 CHRONIC KIDNEY DISEASE, WITHOUT LONG-TERM CURRENT USE OF INSULIN (H): ICD-10-CM

## 2019-07-14 DIAGNOSIS — N18.30 TYPE 2 DIABETES MELLITUS WITH STAGE 3 CHRONIC KIDNEY DISEASE, WITHOUT LONG-TERM CURRENT USE OF INSULIN (H): ICD-10-CM

## 2019-07-15 NOTE — TELEPHONE ENCOUNTER
"Requested Prescriptions   Pending Prescriptions Disp Refills     ONETOUCH ULTRA test strip [Pharmacy Med Name: ONE TOUCH ULTRA BLUE TEST STR 25'S] 100 strip 0     Sig: USE TO TEST ONCE DAILY   Last Written Prescription Date:  03/11/2019  Last Fill Quantity: 100,  # refills: 0   Last office visit: No previous visit found with prescribing provider:     Future Office Visit:   Next 5 appointments (look out 90 days)    Sep 12, 2019  5:20 PM CDT  SHORT with Ugo Delgado MD  [facility] ([facility]) [address]  [facility] [address]  [phone]           Diabetic Supplies Protocol Passed - 7/14/2019  3:35 AM        Passed - Medication is active on med list        Passed - Patient is 18 years of age or older        Passed - Recent (6 mo) or future (30 days) visit within the authorizing provider's specialty     Patient had office visit in the last 6 months or has a visit in the next 30 days with authorizing provider.  See \"Patient Info\" tab in inbasket, or \"Choose Columns\" in Meds & Orders section of the refill encounter.            "

## 2019-07-16 NOTE — TELEPHONE ENCOUNTER
Patient No Showed for his INR appointment on 7/16/19    Patient has not been to the INR Clinic since 10/8/18, did have INR checked when he went to the ED 3/14/19 and it was subtherapeutic.    Yumiko Storey RN

## 2019-07-24 ENCOUNTER — ANTICOAGULATION THERAPY VISIT (OUTPATIENT)
Dept: ANTICOAGULATION | Facility: CLINIC | Age: 64
End: 2019-07-24

## 2019-07-24 DIAGNOSIS — Z79.01 LONG TERM CURRENT USE OF ANTICOAGULANT THERAPY: ICD-10-CM

## 2019-07-24 DIAGNOSIS — D68.51 FACTOR 5 LEIDEN MUTATION, HETEROZYGOUS (H): ICD-10-CM

## 2019-07-24 NOTE — PROGRESS NOTES
Discharging patient from INR clinic monitoring due to non compliance.  He will need to see MD prior to any additional INR visits.  See phone encounter dated 7/3/19 for additional documentation.  Kaitlin Lassiter RN

## 2019-07-26 DIAGNOSIS — D73.5 SPLENIC INFARCTION: ICD-10-CM

## 2019-07-26 DIAGNOSIS — D68.51 FACTOR 5 LEIDEN MUTATION, HETEROZYGOUS (H): ICD-10-CM

## 2019-07-26 DIAGNOSIS — Q21.12 PATENT FORAMEN OVALE WITH RIGHT TO LEFT SHUNT: Primary | ICD-10-CM

## 2019-07-26 RX ORDER — WARFARIN SODIUM 7.5 MG/1
TABLET ORAL
Qty: 24 TABLET | Refills: 0 | Status: SHIPPED | OUTPATIENT
Start: 2019-07-26 | End: 2019-08-24

## 2019-07-26 NOTE — TELEPHONE ENCOUNTER
Patient called back and would like a call to let him know this has been done so he has not problems at this appt on Monday  Ok to call and Eugene [phone]

## 2019-07-26 NOTE — TELEPHONE ENCOUNTER
Patient calling to request a refill of his warfarin.  He has been discharged from [facility] INR clinic due to non compliance.  He states that he has been under a lot of stress recently and has not been able to make it to the clinic for his visitis.  He plans to continue having Dr. Delgado for his PCP, but plans to go to the [facility] INR clinic for warfarin management since that is closer to his home.  He has an INR appointment scheduled on 7/29.    Patient will need a new referral for INR clinic since he has been non compliant.   Kaitlin Lassiter RN

## 2019-07-29 ENCOUNTER — TELEPHONE (OUTPATIENT)
Dept: NURSING | Facility: CLINIC | Age: 64
End: 2019-07-29

## 2019-07-29 ENCOUNTER — ANTICOAGULATION THERAPY VISIT (OUTPATIENT)
Dept: NURSING | Facility: CLINIC | Age: 64
End: 2019-07-29
Payer: COMMERCIAL

## 2019-07-29 DIAGNOSIS — Z79.01 LONG TERM CURRENT USE OF ANTICOAGULANT THERAPY: ICD-10-CM

## 2019-07-29 DIAGNOSIS — D68.51 FACTOR 5 LEIDEN MUTATION, HETEROZYGOUS (H): ICD-10-CM

## 2019-07-29 LAB — INR POINT OF CARE: 1.9 (ref 0.86–1.14)

## 2019-07-29 PROCEDURE — 99207 ZZC NO CHARGE NURSE ONLY: CPT

## 2019-07-29 PROCEDURE — 36416 COLLJ CAPILLARY BLOOD SPEC: CPT

## 2019-07-29 PROCEDURE — 85610 PROTHROMBIN TIME: CPT | Mod: QW

## 2019-07-29 NOTE — PROGRESS NOTES
ANTICOAGULATION FOLLOW-UP CLINIC VISIT    Patient Name:  Osvaldo Parry  Date:  2019  Contact Type:  Face to Face    SUBJECTIVE:  Patient Findings         Clinical Outcomes     Negatives:   Major bleeding event, Thromboembolic event, Anticoagulation-related hospital admission, Anticoagulation-related ED visit, Anticoagulation-related fatality           OBJECTIVE    INR Protime   Date Value Ref Range Status   2019 1.9 (A) 0.86 - 1.14 Final       ASSESSMENT / PLAN  INR assessment SUB    Recheck INR In: 2 WEEKS    INR Location Clinic      Anticoagulation Summary  As of 2019    INR goal:   2.0-3.0   TTR:   69.8 % (2.6 y)   INR used for dosin.9! 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]             Anticoagulation Episode Summary     INR check location:   Anticoagulation Clinic    Preferred lab:   [facility]    Send INR reminders to:   [facility]    Comments:         Anticoagulation Care Providers     Provider Role Specialty Phone number    Ugo Delgado MD [facility] Internal Medicine [phone]            See the Encounter Report to view Anticoagulation Flowsheet and Dosing Calendar (Go to Encounters tab in chart review, and find the Anticoagulation Therapy Visit)    Dosage adjustment made based on physician directed care plan. Increased tomorrow by 10% then return to maintenance and recheck two weeks.    Patient was not open to the education on diet and the role it  plays in his reading. Patient wanted to discuss home monitoring. Informed that is not an option for him due to his compliance issue. Patient wanted to negotiate when he is seen again. Informed that he will have to make the times that available.    Patient states negative for signs and symptoms of bleeding or blood clots, changes in medication, changes in diet, any signs of infection or antibiotic use, anything new OTC or herbal medications, any missed or extra doses of the warfarin.    Patient informed of the symptoms to be seen for either by INR nurse or ER.    Ada Mccurdy RN

## 2019-07-29 NOTE — TELEPHONE ENCOUNTER
Called and informed the patient at 2:00 pm that there is an order.     Ada Mccurdy RN, [facility]

## 2019-07-29 NOTE — TELEPHONE ENCOUNTER
Reason for Call:  Other INR     Detailed comments: Patient has an INR appointment today at 4:40 pm. Patient would like a call confirming that there is everything that is needed for him to be seen in the INR clinic. Patient does not want to come in if he is not able to be helped today.    Phone Number Patient can be reached at: Cell number on file:    Telephone Information:   Mobile [phone]       Best Time: Anytime    Can we leave a detailed message on this number? YES    Call taken on 7/29/2019 at 1:51 PM by An Kapadia

## 2019-08-16 ENCOUNTER — ANTICOAGULATION THERAPY VISIT (OUTPATIENT)
Dept: NURSING | Facility: CLINIC | Age: 64
End: 2019-08-16
Payer: COMMERCIAL

## 2019-08-16 DIAGNOSIS — D68.51 FACTOR 5 LEIDEN MUTATION, HETEROZYGOUS (H): ICD-10-CM

## 2019-08-16 DIAGNOSIS — Z79.01 LONG TERM CURRENT USE OF ANTICOAGULANT THERAPY: ICD-10-CM

## 2019-08-16 LAB — INR POINT OF CARE: 2.3 (ref 0.86–1.14)

## 2019-08-16 PROCEDURE — 99207 ZZC NO CHARGE NURSE ONLY: CPT

## 2019-08-16 PROCEDURE — 85610 PROTHROMBIN TIME: CPT | Mod: QW

## 2019-08-16 PROCEDURE — 36416 COLLJ CAPILLARY BLOOD SPEC: CPT

## 2019-08-16 NOTE — PROGRESS NOTES
ANTICOAGULATION FOLLOW-UP CLINIC VISIT    Patient Name:  Osvaldo Parry  Date:  2019  Contact Type:  Face to Face    SUBJECTIVE:  Patient Findings         Clinical Outcomes     Negatives:   Major bleeding event, Thromboembolic event, Anticoagulation-related hospital admission, Anticoagulation-related ED visit, Anticoagulation-related fatality           OBJECTIVE    INR Protime   Date Value Ref Range Status   2019 2.3 (A) 0.86 - 1.14 Final       ASSESSMENT / PLAN  INR assessment THER    Recheck INR In: 4 WEEKS    INR Location Clinic      Anticoagulation Summary  As of 2019    INR goal:   2.0-3.0   TTR:   100.0 % (1.1 wk)   INR used for dosin.3 (2019)   Warfarin maintenance plan:   7.5 mg (7.5 mg x 1) every Mon, Wed, Fri; 3.75 mg (7.5 mg x 0.5) all other days   Full warfarin instructions:   7.5 mg every Mon, Wed, Fri; 3.75 mg all other days   Weekly warfarin total:   37.5 mg   No change documented:   Padma Peters RN   Plan last modified:   Ada Mccurdy RN (2019)   Next INR check:   2019   Target end date:       Indications    Factor 5 Leiden mutation  heterozygous (H) [D68.51]  Long-term (current) use of anticoagulants [Z79.01] [Z79.01]  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 Denies any changes to medications or other diet issues. Denies any missed doses or extra doses. Denies bleeding, bruising, or blood clots.  Verbalizes understanding of dosing and recheck date. Patient is aware if signs of clotting (pain, tenderness, swelling, color change in leg or arm, SOB) and bleeding occur (blood in stool, urine, large bruising, bleeding gums, nosebleeds) to have INR check sooner. If sx severe report to ER or concerned for stroke call 911. If general questions or concerns arise, call clinic.       REZA JungN, RN, PHN

## 2019-08-17 DIAGNOSIS — N18.30 CKD (CHRONIC KIDNEY DISEASE) STAGE 3, GFR 30-59 ML/MIN (H): ICD-10-CM

## 2019-08-17 DIAGNOSIS — I25.10 CORONARY ARTERY DISEASE INVOLVING NATIVE CORONARY ARTERY OF NATIVE HEART WITHOUT ANGINA PECTORIS: ICD-10-CM

## 2019-08-19 DIAGNOSIS — I25.10 CORONARY ARTERY DISEASE INVOLVING NATIVE CORONARY ARTERY WITHOUT ANGINA PECTORIS: ICD-10-CM

## 2019-08-19 DIAGNOSIS — E78.5 HYPERLIPIDEMIA WITH TARGET LDL LESS THAN 100: ICD-10-CM

## 2019-08-19 RX ORDER — LISINOPRIL 2.5 MG/1
TABLET ORAL
Qty: 30 TABLET | Refills: 0 | Status: SHIPPED | OUTPATIENT
Start: 2019-08-19 | End: 2019-09-18

## 2019-08-19 RX ORDER — NIACIN 500 MG/1
500 TABLET, EXTENDED RELEASE ORAL AT BEDTIME
Qty: 90 TABLET | Refills: 0 | Status: SHIPPED | OUTPATIENT
Start: 2019-08-19 | End: 2019-11-20

## 2019-08-19 RX ORDER — METOPROLOL SUCCINATE 25 MG/1
TABLET, EXTENDED RELEASE ORAL
Qty: 30 TABLET | Refills: 0 | Status: SHIPPED | OUTPATIENT
Start: 2019-08-19 | End: 2019-09-18

## 2019-08-19 NOTE — TELEPHONE ENCOUNTER
Last office visit 6-29-18 diabetes    Next 5 appointments (look out 90 days)    Sep 12, 2019  5:20 PM CDT  SHORT with Ugo Delgado MD  [facility] [address]  [facility] [address] [phone]          Medications are being filled for 1 time refill only due to:  pt has a future appt scheduled.

## 2019-08-19 NOTE — TELEPHONE ENCOUNTER
"Requested Prescriptions   Pending Prescriptions Disp Refills     lisinopril (PRINIVIL/ZESTRIL) 2.5 MG tablet [Pharmacy Med Name: LISINOPRIL 2.5MG TABLETS]  Last Written Prescription Date:  7/21/2019  Last Fill Quantity: 30,  # refills: 0   Last office visit: No previous visit found with prescribing provider:     Future Office Visit:   Next 5 appointments (look out 90 days)    Sep 12, 2019  5:20 PM CDT  SHORT with Ugo Delgado MD  [facility] ([facility]) [address]  [facility] [address] [phone]        30 tablet 0     Sig: TAKE 1 TABLET BY MOUTH EVERY DAY       ACE Inhibitors (Including Combos) Protocol Failed - 8/17/2019  3:35 AM        Failed - Normal serum creatinine on file in past 12 months     Recent Labs   Lab Test 03/14/19  1532   CR 1.26*             Passed - Blood pressure under 140/90 in past 12 months     BP Readings from Last 3 Encounters:   03/14/19 114/62   06/29/18 108/60   05/24/18 112/63                 Passed - Recent (12 mo) or future (30 days) visit within the authorizing provider's specialty     Patient had office visit in the last 12 months or has a visit in the next 30 days with authorizing provider or within the authorizing provider's specialty.  See \"Patient Info\" tab in inbasket, or \"Choose Columns\" in Meds & Orders section of the refill encounter.              Passed - Medication is active on med list        Passed - Patient is age 18 or older        Passed - Normal serum potassium on file in past 12 months     Recent Labs   Lab Test 03/14/19  1532   POTASSIUM 4.2             metoprolol succinate ER (TOPROL-XL) 25 MG 24 hr tablet [Pharmacy Med Name:  Last Written Prescription Date:  7/21/2019  Last Fill Quantity: 30,  # refills: 0   Last office visit: No previous visit found with prescribing provider:     Future Office Visit:   Next 5 appointments (look out 90 days)    Sep 12, 2019  5:20 PM CDT  SHORT with Ugo Delgado, " "MD  [facility] [address]  [facility] [id number] [phone]        METOPROLOL ER SUCCINATE 25MG TABS] 30 tablet 0     Sig: TAKE 1 TABLET BY MOUTH EVERY DAY       Beta-Blockers Protocol Passed - 8/17/2019  3:35 AM        Passed - Blood pressure under 140/90 in past 12 months     BP Readings from Last 3 Encounters:   03/14/19 114/62   06/29/18 108/60   05/24/18 112/63                 Passed - Patient is age 6 or older        Passed - Recent (12 mo) or future (30 days) visit within the authorizing provider's specialty     Patient had office visit in the last 12 months or has a visit in the next 30 days with authorizing provider or within the authorizing provider's specialty.  See \"Patient Info\" tab in inbasket, or \"Choose Columns\" in Meds & Orders section of the refill encounter.              Passed - Medication is active on med list        "

## 2019-08-24 DIAGNOSIS — E11.22 TYPE 2 DIABETES MELLITUS WITH STAGE 3 CHRONIC KIDNEY DISEASE, WITHOUT LONG-TERM CURRENT USE OF INSULIN (H): ICD-10-CM

## 2019-08-24 DIAGNOSIS — D73.5 SPLENIC INFARCTION: ICD-10-CM

## 2019-08-24 DIAGNOSIS — N18.30 TYPE 2 DIABETES MELLITUS WITH STAGE 3 CHRONIC KIDNEY DISEASE, WITHOUT LONG-TERM CURRENT USE OF INSULIN (H): ICD-10-CM

## 2019-08-26 RX ORDER — WARFARIN SODIUM 7.5 MG/1
TABLET ORAL
Qty: 24 TABLET | Refills: 0 | Status: SHIPPED | OUTPATIENT
Start: 2019-08-26 | End: 2019-09-06

## 2019-08-26 NOTE — TELEPHONE ENCOUNTER
Requested Prescriptions   Pending Prescriptions Disp Refills     metFORMIN (GLUCOPHAGE) 500 MG tablet [Pharmacy Med Name: METFORMIN 500MG TABLETS] 120 tablet 0     Sig: TAKE 1 TABLET BY MOUTH TWICE DAILY WITH MEALS   Last Written Prescription Date:  06/23/2019  Last Fill Quantity: 120,  # refills: 0   Last office visit: No previous visit found with prescribing provider:     Future Office Visit:   Next 5 appointments (look out 90 days)    Sep 12, 2019  5:20 PM CDT  SHORT with Ugo Delgado MD  [facility] ([facility]) [address]  [facility] [address]  [phone]           Biguanide Agents Failed - 8/24/2019  9:25 AM        Failed - Patient has documented LDL within the past 12 mos.     Recent Labs   Lab Test 06/28/18  0941   LDL Cannot estimate LDL when triglyceride exceeds 400 mg/dL  87             Failed - Patient has had a Microalbumin in the past 15 mos.     Recent Labs   Lab Test 08/10/16  1122   MICROL 28   UMALCR 12.97             Failed - Patient has documented A1c within the specified period of time.     If HgbA1C is 8 or greater, it needs to be on file within the past 3 months.  If less than 8, must be on file within the past 6 months.     Recent Labs   Lab Test 06/28/18  0941   A1C 6.1*             Passed - Blood pressure less than 140/90 in past 6 months     BP Readings from Last 3 Encounters:   03/14/19 114/62   06/29/18 108/60   05/24/18 112/63                 Passed - Patient is age 10 or older        Passed - Patient's CR is NOT>1.4 OR Patient's EGFR is NOT<45 within past 12 mos.     Recent Labs   Lab Test 03/14/19  1532   GFRESTIMATED 61   GFRESTBLACK 71       Recent Labs   Lab Test 03/14/19  1532   CR 1.26*             Passed - Patient does NOT have a diagnosis of CHF.        Passed - Medication is active on med list        Passed - Recent (6 mo) or future (30 days) visit within the authorizing provider's specialty     Patient had office visit in  "the last 6 months or has a visit in the next 30 days with authorizing provider or within the authorizing provider's specialty.  See \"Patient Info\" tab in inbasket, or \"Choose Columns\" in Meds & Orders section of the refill encounter.            warfarin (COUMADIN) 7.5 MG tablet [Pharmacy Med Name: WARFARIN SOD 7.5MG TABLETS (YELLOW)] 24 tablet 0     Sig: SEE NOTES   Last Written Prescription Date:  07/26/2019  Last Fill Quantity: 24,  # refills: 0   Last office visit: No previous visit found with prescribing provider:     Future Office Visit:   Next 5 appointments (look out 90 days)    Sep 12, 2019  5:20 PM CDT  SHORT with Ugo Delgado MD  [facility] ([facility]) [address]  [facility] [address]  [phone]           Vitamin K Antagonists Failed - 8/24/2019  9:25 AM        Failed - INR is within goal in the past 6 weeks     Confirm INR is within goal in the past 6 weeks.     Recent Labs   Lab Test 08/16/19   INR 2.3*                       Passed - Recent (12 mo) or future (30 days) visit within the authorizing provider's specialty     Patient had office visit in the last 12 months or has a visit in the next 30 days with authorizing provider or within the authorizing provider's specialty.  See \"Patient Info\" tab in inbasket, or \"Choose Columns\" in Meds & Orders section of the refill encounter.              Passed - Medication is active on med list        Passed - Patient is 18 years of age or older        "

## 2019-08-26 NOTE — TELEPHONE ENCOUNTER
Metformin  Routing refill request to provider for review/approval because:  Labs not current  Next 5 appointments (look out 90 days)    Sep 12, 2019  5:20 PM CDT  SHORT with Ugo Delgado MD  [facility] ([facility]) [address]  [facility] [id number] [phone]        Warfarin  Will route to INR

## 2019-09-06 DIAGNOSIS — D73.5 SPLENIC INFARCTION: ICD-10-CM

## 2019-09-06 NOTE — TELEPHONE ENCOUNTER
"Requested Prescriptions   Pending Prescriptions Disp Refills     warfarin (COUMADIN) 7.5 MG tablet [Pharmacy Med Name: WARFARIN SOD 7.5MG TABLETS (YELLOW)] 24 tablet 0     Sig: TAKE 1/2 TABLET BY MOUTH TUES, THURS, SATURDAY, AND SUN AND 1 TABLET ALL OTHER DAYS OR AS DIRECTED BY INR CLINIC   Last Written Prescription Date:  08/26/2019  Last Fill Quantity: 24,  # refills: 0   Last office visit: No previous visit found with prescribing provider:     Future Office Visit:   Next 5 appointments (look out 90 days)    Sep 12, 2019  5:20 PM CDT  [facility] [address]  [facility] [id number] [phone]           Vitamin K Antagonists Failed - 9/6/2019  3:53 AM        Failed - INR is within goal in the past 6 weeks     Confirm INR is within goal in the past 6 weeks.     Recent Labs   Lab Test 08/16/19   INR 2.3*                       Passed - Recent (12 mo) or future (30 days) visit within the authorizing provider's specialty     Patient had office visit in the last 12 months or has a visit in the next 30 days with authorizing provider or within the authorizing provider's specialty.  See \"Patient Info\" tab in inbasket, or \"Choose Columns\" in Meds & Orders section of the refill encounter.              Passed - Medication is active on med list        Passed - Patient is 18 years of age or older        "

## 2019-09-09 DIAGNOSIS — D73.5 SPLENIC INFARCTION: ICD-10-CM

## 2019-09-09 RX ORDER — WARFARIN SODIUM 7.5 MG/1
TABLET ORAL
Qty: 60 TABLET | Refills: 0 | Status: SHIPPED | OUTPATIENT
Start: 2019-09-09 | End: 2019-12-17

## 2019-09-09 NOTE — TELEPHONE ENCOUNTER
Prescription approved per [facility] to the [facility] Refill Protocol.  Ada Mccurdy RN, [facility]

## 2019-09-09 NOTE — TELEPHONE ENCOUNTER
"Requested Prescriptions   Pending Prescriptions Disp Refills     warfarin ANTICOAGULANT (COUMADIN) 7.5 MG tablet [Pharmacy Med Name:  Last Written Prescription Date:  9/9/2019  Last Fill Quantity: 60,  # refills: 0   Last office visit: No previous visit found with prescribing provider:     Future Office Visit:   Next 5 appointments (look out 90 days)    Sep 12, 2019  5:20 PM CDT  SHORT with Ugo Delgado MD  [facility] [address]  [facility] [id number] [phone]        WARFARIN SOD 7.5MG TABLETS (YELLOW)] 24 tablet 0     Sig: TAKE 1/2 TABLET BY MOUTH TUES, THURS, SATURDAY, AND SUN AND 1 TABLET ALL OTHER DAYS OR AS DIRECTED BY INR CLINIC       Vitamin K Antagonists Failed - 9/9/2019  3:53 AM        Failed - INR is within goal in the past 6 weeks     Confirm INR is within goal in the past 6 weeks.     Recent Labs   Lab Test 08/16/19   INR 2.3*                       Passed - Recent (12 mo) or future (30 days) visit within the authorizing provider's specialty     Patient had office visit in the last 12 months or has a visit in the next 30 days with authorizing provider or within the authorizing provider's specialty.  See \"Patient Info\" tab in inbasket, or \"Choose Columns\" in Meds & Orders section of the refill encounter.              Passed - Medication is active on med list        Passed - Patient is 18 years of age or older        "

## 2019-09-10 RX ORDER — WARFARIN SODIUM 7.5 MG/1
TABLET ORAL
Qty: 24 TABLET | Refills: 0 | OUTPATIENT
Start: 2019-09-10

## 2019-09-11 ENCOUNTER — NURSE TRIAGE (OUTPATIENT)
Dept: NURSING | Facility: CLINIC | Age: 64
End: 2019-09-11

## 2019-09-11 ENCOUNTER — TELEPHONE (OUTPATIENT)
Dept: INTERNAL MEDICINE | Facility: CLINIC | Age: 64
End: 2019-09-11

## 2019-09-11 NOTE — TELEPHONE ENCOUNTER
Caller requesting message to PCP   See telelphone encounter   Rica Santiago RN  [facility]      Reason for Disposition    General information question, no triage required and triager able to answer question    Protocols used: INFORMATION ONLY CALL-A-AH

## 2019-09-12 ENCOUNTER — OFFICE VISIT (OUTPATIENT)
Dept: INTERNAL MEDICINE | Facility: CLINIC | Age: 64
End: 2019-09-12
Payer: COMMERCIAL

## 2019-09-12 VITALS
SYSTOLIC BLOOD PRESSURE: 110 MMHG | OXYGEN SATURATION: 98 % | DIASTOLIC BLOOD PRESSURE: 72 MMHG | RESPIRATION RATE: 12 BRPM | BODY MASS INDEX: 26.58 KG/M2 | WEIGHT: 207.1 LBS | TEMPERATURE: 97.9 F | HEART RATE: 88 BPM | HEIGHT: 74 IN

## 2019-09-12 DIAGNOSIS — D73.5 SPLENIC INFARCTION: ICD-10-CM

## 2019-09-12 DIAGNOSIS — D68.51 FACTOR 5 LEIDEN MUTATION, HETEROZYGOUS (H): ICD-10-CM

## 2019-09-12 DIAGNOSIS — E11.22 TYPE 2 DIABETES MELLITUS WITH STAGE 3 CHRONIC KIDNEY DISEASE, WITHOUT LONG-TERM CURRENT USE OF INSULIN (H): Primary | ICD-10-CM

## 2019-09-12 DIAGNOSIS — K04.7 DENTAL INFECTION: ICD-10-CM

## 2019-09-12 DIAGNOSIS — H00.012 HORDEOLUM EXTERNUM OF RIGHT LOWER EYELID: ICD-10-CM

## 2019-09-12 DIAGNOSIS — E78.5 HYPERLIPIDEMIA LDL GOAL <100: ICD-10-CM

## 2019-09-12 DIAGNOSIS — K04.7 ABSCESSED TOOTH: ICD-10-CM

## 2019-09-12 DIAGNOSIS — I25.10 CORONARY ARTERY DISEASE INVOLVING NATIVE CORONARY ARTERY OF NATIVE HEART WITHOUT ANGINA PECTORIS: ICD-10-CM

## 2019-09-12 DIAGNOSIS — N52.9 ERECTILE DYSFUNCTION, UNSPECIFIED ERECTILE DYSFUNCTION TYPE: ICD-10-CM

## 2019-09-12 DIAGNOSIS — N18.30 TYPE 2 DIABETES MELLITUS WITH STAGE 3 CHRONIC KIDNEY DISEASE, WITHOUT LONG-TERM CURRENT USE OF INSULIN (H): Primary | ICD-10-CM

## 2019-09-12 PROCEDURE — 99214 OFFICE O/P EST MOD 30 MIN: CPT | Performed by: INTERNAL MEDICINE

## 2019-09-12 RX ORDER — SILDENAFIL 100 MG/1
50-100 TABLET, FILM COATED ORAL DAILY PRN
Qty: 20 TABLET | Refills: 11 | Status: SHIPPED | OUTPATIENT
Start: 2019-09-12 | End: 2020-01-24

## 2019-09-12 RX ORDER — PENICILLIN V POTASSIUM 500 MG/1
500 TABLET, FILM COATED ORAL 4 TIMES DAILY
Qty: 40 TABLET | Refills: 0 | Status: SHIPPED | OUTPATIENT
Start: 2019-09-12

## 2019-09-12 RX ORDER — OXYCODONE AND ACETAMINOPHEN 5; 325 MG/1; MG/1
1-2 TABLET ORAL EVERY 6 HOURS PRN
Qty: 10 TABLET | Refills: 0 | Status: SHIPPED | OUTPATIENT
Start: 2019-09-12 | End: 2020-01-22

## 2019-09-12 RX ORDER — GENTAMICIN SULFATE 3 MG/ML
1-2 SOLUTION/ DROPS OPHTHALMIC EVERY 4 HOURS
Qty: 5 ML | Refills: 0 | Status: SHIPPED | OUTPATIENT
Start: 2019-09-12

## 2019-09-12 ASSESSMENT — MIFFLIN-ST. JEOR: SCORE: 1809.15

## 2019-09-12 NOTE — PATIENT INSTRUCTIONS
We can update lipids, A1c and other labs this Monday.     Needed medication refills sent to [facility] (except for written one for Viagra).     See me in six months, with fasting labs a few days in advance.

## 2019-09-12 NOTE — PROGRESS NOTES
Subjective   Osvaldo Parry is a 64 year old male who presents to clinic today for the following health issues:  Visit Time: 1829 -- 1851      Diabetes:   He presents for follow up of diabetes.  He is checking home blood glucose one time daily. He checks blood glucose before meals.  Blood glucose is never over 200 and never under 70. He is aware of hypoglycemia symptoms including other and blurred vision. He has no concerns regarding his diabetes at this time.  He is not experiencing numbness or burning in feet, excessive thirst, blurry vision, weight changes or redness, sores or blisters on feet. The patient has not had a diabetic eye exam in the last 12 months.     Diabetes Management Resources    Hyperlipidemia:  He presents for follow up of hyperlipidemia.  He is taking medication to lower cholesterol. He is not having myalgia or other side effects to statin medications.He is not reporting shortness of breath, increased sweating or nausea with activity, left-sided neck or arm pain, chest pain or pressure, or pain in calves when walking 1-2 blocks.    Lab Results   Component Value Date    A1C 6.1 06/28/2018    A1C 6.0 08/24/2017    A1C 6.1 08/10/2016    A1C 5.9 10/08/2015    A1C 6.0 03/24/2015     Recent Labs   Lab Test 06/28/18  0941 08/24/17  0943  10/08/15  0928 03/24/15  1003   CHOL 150 123   < > 133 107   HDL 21* 23*   < > 24* 24*   LDL Cannot estimate LDL when triglyceride exceeds 400 mg/dL  87 66   < > 79 41   TRIG 416* 171*   < > 152* 210*   CHOLHDLRATIO  --   --   --  5.5* 4.5    < > = values in this interval not displayed.     The patient's morning blood glucose readings range between 114 mg/dL - 127 mg/dL. He notes that if he doesn't have a meal at night, his morning glucose levels will be around the 76 mg/dL range. He is compliant with Metformin and Vitoza without acute issues. He notes that his appetite has decreased with Vitoza and subsequently decreased weight. His meals are small and frequent  "to improve blood glucose.     Right Eye Discharge:  Mr. Parry notes that his right eye is \"dark\" with complaints of twitching in his right lower eyelid. He is able to reproduce this twitch. The patient also reports purulence that accumulates in his medial canthus.     Tooth Decay with Drainage:  He complains today of a \"bad tooth\" in his right upper jaw with associated pain and purulent discharge. He has taken Penicillin in the past for similar issues. He has an appointment with his dentist within the next 10 days.    Reviewed and updated as needed this visit by Provider         Review of Systems   No dyspnea or cough. No chest discomfort, dizziness or palpitations. No diarrhea, abdominal pain or rectal bleeding.   No acute problems with vision or speech, lateralizing weakness or paresthesias.    ROS: as above or negative for Respiratory, CV, GI, endocrine, neuro systems.    This document serves as a record of the services and decisions personally performed and made by Ugo Delgado MD. It was created on his behalf by Dinh Bhatt, a trained medical scribe. The creation of this document is based the provider's statements to the medical scribe.  Dinh Bhatt September 12, 2019 6:44 PM      Objective    /72   Pulse 88   Temp 97.9  F (36.6  C) (Oral)   Resp 12   Ht 1.88 m (6' 2\")   Wt 93.9 kg (207 lb 1.6 oz)   SpO2 98%   BMI 26.59 kg/m    Body mass index is 26.59 kg/m .  Physical Exam   GENERAL: healthy, alert and no distress  EYES: Erythematous, tender nodule on right lower eyelid with minimal purulent drainage, otherwise, eyes grossly normal to inspection  RESP: lungs clear to auscultation - no rales, rhonchi or wheezes  CV: regular rate and rhythm, normal S1 S2, no S3 or S4, no murmur, click or rub, no peripheral edema and peripheral pulses strong  MS: no gross musculoskeletal defects noted, no edema  SKIN: no suspicious lesions or rashes  PSYCH: mentation appears normal, affect " "normal/bright    Diabetic foot exam: normal DP and PT pulses, no trophic changes or ulcerative lesions, normal sensory exam and normal monofilament exam    Diagnostic Test Results:  Labs reviewed in Epic        Assessment & Plan     (E11.22,  N18.3) Type 2 diabetes mellitus with stage 3 chronic kidney disease, without long-term current use of insulin (H)  (primary encounter diagnosis)  Comment: A1c at target.   Plan: Continue current measures.    (I25.10) Coronary artery disease involving native coronary artery of native heart without angina pectoris  Comment: Stable, no acute issues.  Plan: Continue current medications.    (E78.5) Hyperlipidemia LDL goal <100  Comment: LDL at target.  Plan: Continue current medications.    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  Plan: INR Check.    (K04.7) Dental infection  (K04.7) Abscessed tooth  Comment: The patient complained today of dental pain in a tooth located in his right upper jaw with associated drainage. Scheduled for a dental appointment in 10 days. Will prescribe Penicillin in the meantime and Percocet for pain control.  Plan: penicillin V (VEETID) 500 MG tablet           oxyCODONE-acetaminophen (PERCOCET) 5-325 MG             tablet          (N52.9) Erectile dysfunction, unspecified erectile dysfunction type  Comment: Refill requested.  Plan: sildenafil (VIAGRA) 100 MG tablet          (H00.012) Hordeolum externum of right lower eyelid  Comment: Mr. Parry notes that his right eye is \"dark\" with complaints of twitching in his right lower eyelid. He is able to reproduce this twitch. The patient also reports purulence that accumulates in his medial canthus.   Plan: gentamicin (GARAMYCIN) 0.3 % ophthalmic         solution           Tobacco Cessation:  Reports that he has been smoking.  He has been smoking about 0.50 packs per day. He has never used smokeless tobacco.    BMI:   Estimated body mass " "index is 26.59 kg/m  as calculated from the following:    Height as of this encounter: 1.88 m (6' 2\").    Weight as of this encounter: 93.9 kg (207 lb 1.6 oz).     Return in about 6 months (around 3/12/2020) for Diabetes Recheck.    The information in this document, created by the medical scribe for me, accurately reflects the services I personally performed and the decisions made by me. I have reviewed and approved this document for accuracy prior to leaving the patient care area.  September 12, 2019 6:45 PM    Ugo Delgado MD,   [facility]    "

## 2019-09-13 ENCOUNTER — TELEPHONE (OUTPATIENT)
Dept: INTERNAL MEDICINE | Facility: CLINIC | Age: 64
End: 2019-09-13

## 2019-09-13 NOTE — TELEPHONE ENCOUNTER
Patient cancelled his protime day his primary care physician is having him do a lab on Monday    He would like you to check the lab results and call him with orders. Ok to leave voicemail    *he wanted to see you after labs on Monday morning but there was only a 3pm open for INR

## 2019-09-16 ENCOUNTER — ANTICOAGULATION THERAPY VISIT (OUTPATIENT)
Dept: NURSING | Facility: CLINIC | Age: 64
End: 2019-09-16

## 2019-09-16 DIAGNOSIS — D68.51 FACTOR 5 LEIDEN MUTATION, HETEROZYGOUS (H): ICD-10-CM

## 2019-09-16 DIAGNOSIS — E78.5 HYPERLIPIDEMIA WITH TARGET LDL LESS THAN 100: ICD-10-CM

## 2019-09-16 DIAGNOSIS — Z79.01 LONG TERM CURRENT USE OF ANTICOAGULANT THERAPY: ICD-10-CM

## 2019-09-16 DIAGNOSIS — D73.5 SPLENIC INFARCTION: ICD-10-CM

## 2019-09-16 DIAGNOSIS — N18.30 TYPE 2 DIABETES MELLITUS WITH STAGE 3 CHRONIC KIDNEY DISEASE, WITHOUT LONG-TERM CURRENT USE OF INSULIN (H): ICD-10-CM

## 2019-09-16 DIAGNOSIS — E11.22 TYPE 2 DIABETES MELLITUS WITH STAGE 3 CHRONIC KIDNEY DISEASE, WITHOUT LONG-TERM CURRENT USE OF INSULIN (H): ICD-10-CM

## 2019-09-16 LAB
ALBUMIN SERPL-MCNC: 3.9 G/DL (ref 3.4–5)
ALP SERPL-CCNC: 63 U/L (ref 40–150)
ALT SERPL W P-5'-P-CCNC: 34 U/L (ref 0–70)
ANION GAP SERPL CALCULATED.3IONS-SCNC: 5 MMOL/L (ref 3–14)
AST SERPL W P-5'-P-CCNC: 26 U/L (ref 0–45)
BILIRUB SERPL-MCNC: 0.5 MG/DL (ref 0.2–1.3)
BUN SERPL-MCNC: 16 MG/DL (ref 7–30)
CALCIUM SERPL-MCNC: 8.9 MG/DL (ref 8.5–10.1)
CHLORIDE SERPL-SCNC: 108 MMOL/L (ref 94–109)
CHOLEST SERPL-MCNC: 125 MG/DL
CO2 SERPL-SCNC: 26 MMOL/L (ref 20–32)
CREAT SERPL-MCNC: 1.38 MG/DL (ref 0.66–1.25)
GFR SERPL CREATININE-BSD FRML MDRD: 54 ML/MIN/{1.73_M2}
GLUCOSE SERPL-MCNC: 89 MG/DL (ref 70–99)
HBA1C MFR BLD: 5.8 % (ref 0–5.6)
HDLC SERPL-MCNC: 23 MG/DL
INR PPP: 2.5 (ref 0.86–1.14)
LDLC SERPL CALC-MCNC: 60 MG/DL
NONHDLC SERPL-MCNC: 102 MG/DL
POTASSIUM SERPL-SCNC: 4.1 MMOL/L (ref 3.4–5.3)
PROT SERPL-MCNC: 7.7 G/DL (ref 6.8–8.8)
SODIUM SERPL-SCNC: 139 MMOL/L (ref 133–144)
TRIGL SERPL-MCNC: 208 MG/DL
TSH SERPL DL<=0.005 MIU/L-ACNC: 0.75 MU/L (ref 0.4–4)

## 2019-09-16 PROCEDURE — 36415 COLL VENOUS BLD VENIPUNCTURE: CPT | Performed by: INTERNAL MEDICINE

## 2019-09-16 PROCEDURE — 80061 LIPID PANEL: CPT | Performed by: INTERNAL MEDICINE

## 2019-09-16 PROCEDURE — 83036 HEMOGLOBIN GLYCOSYLATED A1C: CPT | Performed by: INTERNAL MEDICINE

## 2019-09-16 PROCEDURE — 85610 PROTHROMBIN TIME: CPT | Performed by: INTERNAL MEDICINE

## 2019-09-16 PROCEDURE — 84443 ASSAY THYROID STIM HORMONE: CPT | Performed by: INTERNAL MEDICINE

## 2019-09-16 PROCEDURE — 80053 COMPREHEN METABOLIC PANEL: CPT | Performed by: INTERNAL MEDICINE

## 2019-09-16 NOTE — PROGRESS NOTES
ANTICOAGULATION FOLLOW-UP CLINIC VISIT    Patient Name:  Osvaldo Parry  Date:  2019  Contact Type:  Face to Face    SUBJECTIVE:         OBJECTIVE    INR   Date Value Ref Range Status   2019 2.50 (H) 0.86 - 1.14 Final     Comment:     This test is intended for monitoring Coumadin therapy.  Results are not   accurate in patients with prolonged INR due to factor deficiency.         ASSESSMENT / PLAN  INR assessment THER    Recheck INR In: 4 WEEKS    INR Location Clinic      Anticoagulation Summary  As of 2019    INR goal:   2.0-3.0   TTR:   100.0 % (1.3 mo)   INR used for dosin.50 (2019)   Warfarin maintenance plan:   7.5 mg (7.5 mg x 1) every Mon, Wed, Fri; 3.75 mg (7.5 mg x 0.5) all other days   Full warfarin instructions:   7.5 mg every Mon, Wed, Fri; 3.75 mg all other days   Weekly warfarin total:   37.5 mg   No change documented:   Ada Mccurdy RN   Plan last modified:   Ada Mccurdy RN (2019)   Next INR check:   10/15/2019   Target end date:       Indications    Factor 5 Leiden mutation  heterozygous (H) [D68.51]  Long-term (current) use of anticoagulants [Z79.01] [Z79.01]  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]             Anticoagulation Episode Summary     INR check location:   Anticoagulation Clinic    Preferred lab:   [facility] NATTY Mack    Send INR reminders to:   [facility]    Comments:         Anticoagulation Care Providers     Provider Role Specialty Phone number    Ugo Delgado MD [facility] [phone]            See the Encounter Report to view Anticoagulation Flowsheet and Dosing Calendar (Go to Encounters tab in chart review, and find the Anticoagulation Therapy Visit)    Therapeutic INR 2.5. Will continue maintenance dose and recheck in 4 week(s).    Patient states negative for signs and symptoms of bleeding or blood clots, changes in medication, changes in diet, any signs of infection or antibiotic use,  anything new OTC or herbal medications, any missed or extra doses of the warfarin.    Patient informed of the symptoms to be seen for either by INR nurse or ER.    Patient ask if refill of warfarin is needed. Rx sent no    AVS.calendar mailed to patient.      Ada Mccurdy RN

## 2019-09-16 NOTE — TELEPHONE ENCOUNTER
Please see the September 16, 2019 Anticoagulation encounter for further information.  Ada Mccurdy RN, [facility]

## 2019-09-18 ENCOUNTER — TELEPHONE (OUTPATIENT)
Dept: INTERNAL MEDICINE | Facility: CLINIC | Age: 64
End: 2019-09-18

## 2019-09-19 NOTE — TELEPHONE ENCOUNTER
Sent patient a [employer] message--please advise him of this and help him to get back on MyChart if he needs to reset.

## 2019-10-22 DIAGNOSIS — E78.5 HYPERLIPIDEMIA LDL GOAL <100: ICD-10-CM

## 2019-10-22 NOTE — TELEPHONE ENCOUNTER
"Requested Prescriptions   Pending Prescriptions Disp Refills     atorvastatin (LIPITOR) 20 MG tablet [Pharmacy Med Name: ATORVASTATIN 20MG TABLETS]  Last Written Prescription Date:  7/21/2019  Last Fill Quantity: 90 tab,  # refills: 0   Last Office Visit: 9/12/2019 Sandy  Future Office Visit:      90 tablet 0     Sig: TAKE 1 TABLET BY MOUTH DAILY       Statins Protocol Passed - 10/22/2019  3:35 AM        Passed - LDL on file in past 12 months     Recent Labs   Lab Test 09/16/19  0948   LDL 60             Passed - No abnormal creatine kinase in past 12 months     No lab results found.             Passed - Recent (12 mo) or future (30 days) visit within the authorizing provider's specialty     Patient has had an office visit with the authorizing provider or a provider within the authorizing providers department within the previous 12 mos or has a future within next 30 days. See \"Patient Info\" tab in inbasket, or \"Choose Columns\" in Meds & Orders section of the refill encounter.              Passed - Medication is active on med list        Passed - Patient is age 18 or older        "

## 2019-10-23 RX ORDER — ATORVASTATIN CALCIUM 20 MG/1
TABLET, FILM COATED ORAL
Qty: 90 TABLET | Refills: 3 | Status: SHIPPED | OUTPATIENT
Start: 2019-10-23 | End: 2020-10-27

## 2019-10-28 ENCOUNTER — HEALTH MAINTENANCE LETTER (OUTPATIENT)
Age: 64
End: 2019-10-28

## 2019-11-11 ENCOUNTER — TELEPHONE (OUTPATIENT)
Dept: NURSING | Facility: CLINIC | Age: 64
End: 2019-11-11

## 2019-11-11 NOTE — TELEPHONE ENCOUNTER
Called patient for over due INR and scheduled in clinic for 11/12/19..    Ada Mccurdy RN, [facility]

## 2019-11-12 ENCOUNTER — TELEPHONE (OUTPATIENT)
Dept: INTERNAL MEDICINE | Facility: CLINIC | Age: 64
End: 2019-11-12

## 2019-11-12 NOTE — TELEPHONE ENCOUNTER
Reason for Call:  Other appointment    Detailed comments: Pt cancelled appointment for today 11/12/2019 at 4:00 as he states he is at home sick, doesn't know when he will be able to reschedule and would like and INR nurse to please call him back to advise. Thank you.    Phone Number Patient can be reached at: Home number on file [phone] (home)    Best Time: Any    Can we leave a detailed message on this number? YES    Call taken on 11/12/2019 at 3:32 PM by Masha Corral

## 2019-11-19 NOTE — TELEPHONE ENCOUNTER
Scheduled for today at 4 pm. Patient informed again the Wednesdays are not an option to be seen.    Ada Mccurdy RN, [facility]

## 2019-11-20 DIAGNOSIS — E78.5 HYPERLIPIDEMIA WITH TARGET LDL LESS THAN 100: ICD-10-CM

## 2019-11-20 DIAGNOSIS — I25.10 CORONARY ARTERY DISEASE INVOLVING NATIVE CORONARY ARTERY WITHOUT ANGINA PECTORIS: ICD-10-CM

## 2019-11-20 RX ORDER — NIACIN 500 MG/1
500 TABLET, EXTENDED RELEASE ORAL AT BEDTIME
Qty: 90 TABLET | Refills: 0 | Status: SHIPPED | OUTPATIENT
Start: 2019-11-20 | End: 2020-06-30

## 2019-12-12 ENCOUNTER — TELEPHONE (OUTPATIENT)
Dept: NURSING | Facility: CLINIC | Age: 64
End: 2019-12-12

## 2019-12-12 NOTE — TELEPHONE ENCOUNTER
Compliance letter sent to patient as he has no-showed or cancelled all his INR appointments after his start.      Court Canseco RN, BSN, PHN

## 2019-12-12 NOTE — LETTER
12/12/19              Osvaldo Parry,   We are concerned about your Warfarin Therapy. You have failed to have the necessary laboratory work required for monitoring your warfarin therapy.      We value you as a patient and seek to provide you with excellent quality of care. We need to be notified if you have changed physicians or if you are being monitored elsewhere so we can update our files.   If we do not hear from you within 14 days of the date of this letter, we will assume that you no longer are requesting our services and will inactivate you from our warfarin monitoring service.   For any further laboratory work and warfarin dose adjustments you will need to contact your care provider for anticoagulation management.      We want to remind you that there are potential serious risks involved in stopping your warfarin or taking warfarin without careful monitoring.   Please contact our office at [phone] with any information or questions you may have.   Sincerely,     [facility]

## 2019-12-13 ENCOUNTER — TELEPHONE (OUTPATIENT)
Dept: INTERNAL MEDICINE | Facility: CLINIC | Age: 64
End: 2019-12-13

## 2019-12-13 DIAGNOSIS — D68.51 FACTOR 5 LEIDEN MUTATION, HETEROZYGOUS (H): Primary | ICD-10-CM

## 2019-12-13 DIAGNOSIS — Q21.12 PATENT FORAMEN OVALE WITH RIGHT TO LEFT SHUNT: ICD-10-CM

## 2019-12-13 DIAGNOSIS — D73.5 SPLENIC INFARCTION: ICD-10-CM

## 2019-12-13 NOTE — TELEPHONE ENCOUNTER
Patient is calling to ask if he can get off of his coumadin. He says it is hard with his schedule to keep coming into the clinic for the monitoring.

## 2019-12-13 NOTE — TELEPHONE ENCOUNTER
Please refer to multiple encounters stating patient has no-showed or cancelled all his INR appointments after his start and message below, please advise of patient's options.

## 2019-12-16 NOTE — TELEPHONE ENCOUNTER
Anticoagulation RN: Please advise pt:    I don't believe that Mr Parry ever followed up with Heme/Onc in the office after his 12/2013 hospitalization with splenic infarct. They were to review labs with him and help us decide duration of need of anticoagulation.     It may be ok to stop warfarin--I'd feel much more comfortable with the decision if Heme/Onc helped us out with an office consult.   If he agrees, I'll place an order (he saw Dr Edward at that time who has retired. Placed order with [facility]).

## 2019-12-16 NOTE — TELEPHONE ENCOUNTER
Patient call back after speaking with his insurance. Patient reports that the 5 mg and higher of Xarelto is covered by his insurance.   Yumiko Storey RN

## 2019-12-16 NOTE — TELEPHONE ENCOUNTER
Called patient, states he has not had an Oncology/Hematology appointment.  Patient is Factor V, he is not trying to stop warfarin and not take anything. Patient is requesting an alternative to warfarin. Please advise  Yumiko Storey RN

## 2019-12-17 ENCOUNTER — TELEPHONE (OUTPATIENT)
Dept: ONCOLOGY | Facility: CLINIC | Age: 64
End: 2019-12-17

## 2019-12-17 NOTE — TELEPHONE ENCOUNTER
E-prescribed Rx for Xarelto 20 mg po daily. May discontinue warfarin.   He should schedule an office appointment with me for routine follow up by March 2020 at the latest.     Please advise pt.

## 2019-12-18 ENCOUNTER — ANTICOAGULATION THERAPY VISIT (OUTPATIENT)
Dept: NURSING | Facility: CLINIC | Age: 64
End: 2019-12-18

## 2019-12-18 ENCOUNTER — ANTICOAGULATION THERAPY VISIT (OUTPATIENT)
Dept: ANTICOAGULATION | Facility: CLINIC | Age: 64
End: 2019-12-18

## 2019-12-18 DIAGNOSIS — D68.51 FACTOR 5 LEIDEN MUTATION, HETEROZYGOUS (H): ICD-10-CM

## 2019-12-18 DIAGNOSIS — Z79.01 LONG TERM CURRENT USE OF ANTICOAGULANT THERAPY: ICD-10-CM

## 2019-12-18 NOTE — TELEPHONE ENCOUNTER
Notified patient of MD's message and assisted him to schedule visit.     Next 5 appointments (look out 90 days)    Feb 19, 2020 10:00 AM CST  SHORT with Ugo Delgado MD  [facility] [address]  [facility] [id number] [phone]        Kaitlin Lassiter RN

## 2019-12-18 NOTE — TELEPHONE ENCOUNTER
Ok to discharge from ACC program? Patient showed up for initial consult but has no showed or cancelled every appointment since. Compliance letter sent and patient did not follow up. Patient scheduled to discuss with provider stopping warfarin so he does not have to do monitoring anymore. Please advise and route back to [facility].      Court Canseco RN, BSN, PHN

## 2020-01-22 ENCOUNTER — APPOINTMENT (OUTPATIENT)
Dept: GENERAL RADIOLOGY | Facility: CLINIC | Age: 65
End: 2020-01-22
Attending: EMERGENCY MEDICINE
Payer: COMMERCIAL

## 2020-01-22 ENCOUNTER — APPOINTMENT (OUTPATIENT)
Dept: CT IMAGING | Facility: CLINIC | Age: 65
End: 2020-01-22
Attending: EMERGENCY MEDICINE
Payer: COMMERCIAL

## 2020-01-22 ENCOUNTER — HOSPITAL ENCOUNTER (EMERGENCY)
Facility: CLINIC | Age: 65
Discharge: HOME | End: 2020-01-22
Attending: EMERGENCY MEDICINE | Admitting: EMERGENCY MEDICINE
Payer: COMMERCIAL

## 2020-01-22 VITALS
BODY MASS INDEX: 25.94 KG/M2 | RESPIRATION RATE: 16 BRPM | DIASTOLIC BLOOD PRESSURE: 57 MMHG | HEART RATE: 75 BPM | OXYGEN SATURATION: 95 % | WEIGHT: 202 LBS | SYSTOLIC BLOOD PRESSURE: 116 MMHG

## 2020-01-22 DIAGNOSIS — N30.01 ACUTE CYSTITIS WITH HEMATURIA: ICD-10-CM

## 2020-01-22 DIAGNOSIS — S50.01XA CONTUSION OF RIGHT ELBOW, INITIAL ENCOUNTER: ICD-10-CM

## 2020-01-22 DIAGNOSIS — T14.8XXA ABRASION OF SKIN: ICD-10-CM
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
## 2020-01-22 LAB
ALBUMIN UR-MCNC: 50 MG/DL
ANION GAP SERPL CALCULATED.3IONS-SCNC: 4 MMOL/L (ref 3–14)
APPEARANCE UR: ABNORMAL
BACTERIA #/AREA URNS HPF: ABNORMAL /HPF
BILIRUB UR QL STRIP: NEGATIVE
BUN SERPL-MCNC: 31 MG/DL (ref 7–30)
CALCIUM SERPL-MCNC: 9 MG/DL (ref 8.5–10.1)
CHLORIDE SERPL-SCNC: 106 MMOL/L (ref 94–109)
CO2 SERPL-SCNC: 28 MMOL/L (ref 20–32)
COLOR UR AUTO: YELLOW
CREAT SERPL-MCNC: 1.57 MG/DL (ref 0.66–1.25)
ERYTHROCYTE [DISTWIDTH] IN BLOOD BY AUTOMATED COUNT: 13.4 % (ref 10–15)
GFR SERPL CREATININE-BSD FRML MDRD: 46 ML/MIN/{1.73_M2}
GLUCOSE SERPL-MCNC: 142 MG/DL (ref 70–99)
GLUCOSE UR STRIP-MCNC: NEGATIVE MG/DL
HCT VFR BLD AUTO: 38.2 % (ref 40–53)
HGB BLD-MCNC: 12.3 G/DL (ref 13.3–17.7)
HGB UR QL STRIP: NEGATIVE
INR PPP: 1.23 (ref 0.86–1.14)
INTERPRETATION ECG - MUSE: NORMAL
KETONES UR STRIP-MCNC: NEGATIVE MG/DL
LEUKOCYTE ESTERASE UR QL STRIP: ABNORMAL
MCH RBC QN AUTO: 30.8 PG (ref 26.5–33)
MCHC RBC AUTO-ENTMCNC: 32.2 G/DL (ref 31.5–36.5)
MCV RBC AUTO: 96 FL (ref 78–100)
MUCOUS THREADS #/AREA URNS LPF: PRESENT /LPF
NITRATE UR QL: POSITIVE
PH UR STRIP: 5.5 PH (ref 5–7)
PLATELET # BLD AUTO: 267 10E9/L (ref 150–450)
POTASSIUM SERPL-SCNC: 3.6 MMOL/L (ref 3.4–5.3)
RBC # BLD AUTO: 3.99 10E12/L (ref 4.4–5.9)
RBC #/AREA URNS AUTO: 4 /HPF (ref 0–2)
SODIUM SERPL-SCNC: 138 MMOL/L (ref 133–144)
SOURCE: ABNORMAL
SP GR UR STRIP: 1.03 (ref 1–1.03)
SQUAMOUS #/AREA URNS AUTO: 11 /HPF (ref 0–1)
TROPONIN I SERPL-MCNC: <0.015 UG/L (ref 0–0.04)
UROBILINOGEN UR STRIP-MCNC: NORMAL MG/DL (ref 0–2)
WBC # BLD AUTO: 5.1 10E9/L (ref 4–11)
WBC #/AREA URNS AUTO: 88 /HPF (ref 0–5)

## 2020-01-22 PROCEDURE — 84484 ASSAY OF TROPONIN QUANT: CPT | Performed by: EMERGENCY MEDICINE

## 2020-01-22 PROCEDURE — 85610 PROTHROMBIN TIME: CPT | Performed by: EMERGENCY MEDICINE

## 2020-01-22 PROCEDURE — 93005 ELECTROCARDIOGRAM TRACING: CPT

## 2020-01-22 PROCEDURE — 72125 CT NECK SPINE W/O DYE: CPT

## 2020-01-22 PROCEDURE — 73080 X-RAY EXAM OF ELBOW: CPT | Mod: RT

## 2020-01-22 PROCEDURE — 85027 COMPLETE CBC AUTOMATED: CPT | Performed by: EMERGENCY MEDICINE

## 2020-01-22 PROCEDURE — 72128 CT CHEST SPINE W/O DYE: CPT

## 2020-01-22 PROCEDURE — 70450 CT HEAD/BRAIN W/O DYE: CPT

## 2020-01-22 PROCEDURE — 99285 EMERGENCY DEPT VISIT HI MDM: CPT | Mod: 25

## 2020-01-22 PROCEDURE — 71045 X-RAY EXAM CHEST 1 VIEW: CPT

## 2020-01-22 PROCEDURE — 80048 BASIC METABOLIC PNL TOTAL CA: CPT | Performed by: EMERGENCY MEDICINE

## 2020-01-22 PROCEDURE — 87186 SC STD MICRODIL/AGAR DIL: CPT | Performed by: EMERGENCY MEDICINE

## 2020-01-22 PROCEDURE — 96374 THER/PROPH/DIAG INJ IV PUSH: CPT

## 2020-01-22 PROCEDURE — 96375 TX/PRO/DX INJ NEW DRUG ADDON: CPT

## 2020-01-22 PROCEDURE — 72131 CT LUMBAR SPINE W/O DYE: CPT

## 2020-01-22 PROCEDURE — 81001 URINALYSIS AUTO W/SCOPE: CPT | Performed by: EMERGENCY MEDICINE

## 2020-01-22 PROCEDURE — 87088 URINE BACTERIA CULTURE: CPT | Performed by: EMERGENCY MEDICINE

## 2020-01-22 PROCEDURE — 87086 URINE CULTURE/COLONY COUNT: CPT | Performed by: EMERGENCY MEDICINE

## 2020-01-22 PROCEDURE — 25000128 H RX IP 250 OP 636: Performed by: EMERGENCY MEDICINE

## 2020-01-22 RX ORDER — OXYCODONE HYDROCHLORIDE 5 MG/1
5 TABLET ORAL EVERY 6 HOURS PRN
Qty: 12 TABLET | Refills: 0 | Status: SHIPPED | OUTPATIENT
Start: 2020-01-22 | End: 2020-01-29

## 2020-01-22 RX ORDER — ONDANSETRON 2 MG/ML
4 INJECTION INTRAMUSCULAR; INTRAVENOUS ONCE
Status: COMPLETED | OUTPATIENT
Start: 2020-01-22 | End: 2020-01-22

## 2020-01-22 RX ORDER — MORPHINE SULFATE 4 MG/ML
4 INJECTION, SOLUTION INTRAMUSCULAR; INTRAVENOUS ONCE
Status: COMPLETED | OUTPATIENT
Start: 2020-01-22 | End: 2020-01-22

## 2020-01-22 RX ADMIN — ONDANSETRON HYDROCHLORIDE 4 MG: 2 INJECTION, SOLUTION INTRAMUSCULAR; INTRAVENOUS at 14:19

## 2020-01-22 RX ADMIN — MORPHINE SULFATE 4 MG: 4 INJECTION INTRAVENOUS at 14:19

## 2020-01-22 ASSESSMENT — ENCOUNTER SYMPTOMS
VOMITING: 1
HEADACHES: 0
NECK STIFFNESS: 1
NECK PAIN: 0
BACK PAIN: 1

## 2020-01-22 NOTE — ED AVS SNAPSHOT
[facility] Emergency Department  [address]  [facility] [address]  Phone:  [phone]  Fax:  [phone]                                    Osvaldo Parry   MRN: [id number]    Department:  [facility] Emergency Department   Date of Visit:  1/22/2020           After Visit Summary Signature Page    I have received my discharge instructions, and my questions have been answered. I have discussed any challenges I see with this plan with the nurse or doctor.    ..........................................................................................................................................  Patient/Patient Representative Signature      ..........................................................................................................................................  Patient Representative Print Name and Relationship to Patient    ..................................................               ................................................  Date                                   Time    ..........................................................................................................................................  Reviewed by Signature/Title    ...................................................              ..............................................  Date                                               Time          22EPIC Rev 08/18

## 2020-01-22 NOTE — PROGRESS NOTES
Contacted regarding non-displaced L4 spinous process fracture on CT.  Patient had fall down stairs on Sunday 1/19/20.    Patient will be discharged from ER.  Will contact orthotics for appt for LSO brace tomorrow.    Will arrange NS follow up in one month in NS clinic with lumbar x-rays day of appt.    Discussed with Dr. Goode.    JOHN Peralta  [facility]  [address] [address]    Tel [phone]  Pager [phone]

## 2020-01-22 NOTE — DISCHARGE INSTRUCTIONS
I am not certain what led to your fall.  You do have a fracture of the spinous process of your fourth lumbar vertebrae.  Treatment for this is typically just with pain medication.  I will list an orthopedic clinic for you to follow-up with.  Please call them in the morning to discuss further follow-up down the road.  Please return to the ED if you have worsening symptoms including lightheadedness, additional episodes of passing out, chest pain, shortness of breath, numbness or weakness of the lower extremities, or other symptoms that concern you.    Thank you for allowing us to care for you today.

## 2020-01-22 NOTE — ED NOTES
PIV removed, VSS, Pt verbalized understanding of discharge instructions and ambulated to lobby with steady gait, pt stated that he had a ride home

## 2020-01-22 NOTE — ED TRIAGE NOTES
Pt feel down 16 stairs 1/20 around 1030, woke up at bottom of stairs 0530 1/21. One episode of Emesis x1. On blood thinners. Doesn't remember anything for 5 hrs. A&O X4.

## 2020-01-22 NOTE — ED PROVIDER NOTES
History     Chief Complaint:  Fall and Loss of Consciousness       HPI   Osvaldo Parry is a 64 year old male on xarelto with a history of Factor 5 Leiden, coronary artery disease, hyperlipidemia, and diabetes who presents with fall and loss of consciousness. The patient states that he was going up 16 carpeted stairs 2 nights ago around 2230 and just remembers waking up on the bottom of the stairs the next morning at 0530. He states that he lost consciousness and does not remember falling. The patient complains of low back pain and neck stiffness from the fall. He has had one episode of vomiting. He denies any neck pain or headaches.     Allergies:  Codeine Sulfate  Hydrocodone     Medications:    atorvastatin  lisinopril   Metformin   metoprolol succinate ER   xarelto   sildenafil   varenicline  victoza pen    Past Medical History:    Aortic root dilatation    Ascending aorta dilatation   CKD (chronic kidney disease)   Coronary artery disease   Factor 5 Leiden mutation, heterozygous   Hyperlipidemia   Lymph nodes enlarged   Patent foramen ovale with right to left shunt   RBBB with left anterior fascicular block   Splenic infarction   Type 2 diabetes mellitus     Past Surgical History:    Surgical history reviewed. No pertinent surgical history.    Family History:    Family history reviewed. No pertinent family history.      Social History:  Smoking Status: current smoker  Smokeless Tobacco: Never Used  Alcohol Use: No  Drug Use: No  PCP: Ugo Delgado       Review of Systems   Gastrointestinal: Positive for vomiting.   Musculoskeletal: Positive for back pain (low) and neck stiffness. Negative for neck pain.   Neurological: Negative for headaches.   All other systems reviewed and are negative.        Physical Exam     Patient Vitals for the past 24 hrs:   BP Pulse Heart Rate Resp SpO2 Weight   01/22/20 1430 96/47 83 -- -- 95 % --   01/22/20 1425 100/58 83 -- -- 97 % --   01/22/20 1420 97/58 81 -- -- 97 % --    01/22/20 1415 92/48 81 -- -- 96 % --   01/22/20 1334 100/70 -- 84 16 100 % 91.6 kg (202 lb)        Physical Exam  A: protecting airway, speaking with out difficulty  B: No resp distress, Lungs CTAB  C: central and peripheral pulses intact, skin warm  D: GCS 15, no gross deformities, No focal motor or sensory deficits    Constitutional: Vital signs reviewed as above.   Head: No obvious external signs of trauma noted. Head is without raccoon's eyes and without Ley's sign. No external signs of basilar skull fracture. No signs of facial bone instability.  Eyes: Conjunctivae and EOM are normal. Pupils are equal, round, and reactive to light.  ENT:  Right Ear: External ear and ear canal normal. No lacerations. No mastoid tenderness. No hemotympanum.   Left Ear: External ear and ear canal normal. No lacerations. No mastoid tenderness. No hemotympanum.   Nose: No nose lacerations, nasal deformity, septal deviation or nasal septal hematoma. No epistaxis.   Mouth/Throat: Uvula is midline, oropharynx is clear and moist and mucous membranes are normal.   Neck: Normal range of motion and full passive range of motion without pain. Neck supple. No spinous process tenderness present. No tracheal deviation present.   Cardiovascular: Normal rate, regular rhythm, S1 normal, S2 normal and normal pulses.   Pulmonary/Chest: Effort normal and breath sounds normal. No accessory muscle usage. No respiratory distress. Patient has no decreased breath sounds. Patient has no wheezes. Patient has no rhonchi. Patient has no rales. Patient exhibits no bony tenderness and no retraction.   Abdominal: Soft. Normal appearance and bowel sounds are normal. Patient exhibits no distension. There is no tenderness. There is no rebound.   Musculoskeletal/Extremities:        Back:   Cervical back: There is midline TTP.    Thoracic back: There is T-L junction tenderness.   Lumbar back: there is L-spine midline TTP.       Pelvis:   No ASIS tenderness to  palpation   No pelvic pain or instability to compression       Extremities:   MAEx4. No deformities noted.  Neurological: Patient is alert and oriented to person, place, and time. Patient has normal strength. Patient is not disoriented. No cranial nerve deficit or sensory deficit. GCS eye subscore is 4. GCS verbal subscore is 5. GCS motor subscore is 6.   Skin: Skin is warm, dry and intact. Abrasion on the back and left flank      Emergency Department Course   ECG:  ECG taken at 1423, ECG read at 1433  Normal sinus rhythm  Right bundle branch block  Left anterior fascicular block   Bifascicular block   Abnormal ECG  Rate 81 bpm. AR interval 132 ms. QRS duration 122 ms. QT/QTc 396/460 ms. P-R-T axes 29 -70 -21.   No significant change when compared to EKG dated 03/02/16.     Imaging:  Radiology findings were communicated with the patient who voiced understanding of the findings.    Lumbar spine CT w/o contrast  Final Result  IMPRESSION:   1. There is a nondisplaced recent appearing fracture through the  spinous process of L4. No other fractures are noted in the lumbar  spine.  2. There is normal alignment of the lumbar vertebrae. Vertebral body  heights of the lumbar spine are normal. There are endplate deformities  in the opposing endplates at the L1-L2, L2-L3, L3-L4 and L4-L5 levels  likely representing degenerative Schmorl's node deformities. There are  posterior disc bulges at the L2-L3, L3-4, L4-5 and L5-S1 levels. There  is no significant degenerative spinal canal narrowing of the lumbar  spine.  ZI STARKS MD  Reading per radiology       Elbow XR, G/E 3 views, right  Final Result  IMPRESSION:  Unremarkable examination.  ROBERT GAINES MD  Reading per radiology       XR Chest 1 View  Final Result  IMPRESSION: No pneumothorax or definite infiltrates. No definite  displaced rib fractures.  SUSAN MOORE MD  Reading per radiology       CT Thoracic Spine w/o Contrast  Final Result  IMPRESSION: There is  mild right convex curvature of the thoracic spine  centered at the T5 level. Alignment of the thoracic vertebrae is  otherwise normal. Vertebral body heights of the thoracic spine are  normal. There are no fractures of the thoracic spine. There is no  significant degenerative spinal canal narrowing of the thoracic spine.  Incidental note is made of bilateral C7 cervical ribs.  ZI STARKS MD  Reading per radiology       Cervical spine CT w/o contrast  Final Result  IMPRESSION: There is normal alignment of the cervical vertebrae.  Vertebral body heights of the cervical spine are normal.  Craniocervical alignment is normal. There are no fractures of the  cervical spine. There is new loss of intervertebral disc space height,  degenerative endplate spurring and degenerative uncinate arthropathy  bilaterally at the C5-C6 level. There are minimal posterior disc  bulges at all levels of the cervical spine. There is no prevertebral  soft tissue swelling. There is no significant degenerative spinal  canal narrowing of the cervical spine.  ZI STARKS MD  Reading per radiology       Head CT w/o contrast  Final Result  IMPRESSION: Diffuse cerebral volume loss and cerebral white matter  changes consistent with chronic small vessel ischemic disease. No  evidence for acute intracranial pathology.  ZI STARKS MD  Reading per radiology     Laboratory:  Laboratory findings were communicated with the patient who voiced understanding of the findings.    INR: 1.23 (H)    Troponin(1356):  <0.015      CBC:  WBC 5.1, HGB 12.3 (L), , o/w WNL     BMP: Glucose 142 (H), bun 31 (H), GFR estimate 46 (L), o/w WNL (Creatinine: 1.57 (H))     UA with micro: Protein Albumin Urine 50 (A) Nitrite positive (A) Leukocyte esterase urine large (A) Bacteria many (A) WBC/HPF 88 (H) RBC/HPF 4 (H) Squamous epithelial/HPF urine 11 (H) Mucous urine present (A)  o/w wnl/negative       Urine culture: pending     Interventions:  1419 Morphine 4 mg  IV  1419 zofran 4 mg IV    Emergency Department Course:  Past medical records, nursing notes, and vitals reviewed.    1401 I performed an exam of the patient as documented above.    IV was inserted and blood was drawn for laboratory testing, results above.     The patient was sent for a lumbar spine CT, elbow xray, chest xray, thoracic spine CT, cervical spine CT, and head CT while in the emergency department, results above.      The patient provided a urine sample here in the emergency department. This was sent for laboratory testing, findings above.      1614 Patient rechecked and updated.      1715 I spoke with Anette AMOS of [facility] regarding patient's presentation, findings, and plan of care.  They will reach out to the patient and help arrange back bracing as well as a follow-up appointment in approximately 4 weeks for repeat x-ray.    1719 Patient rechecked and updated. The patient did not want to wait for his urinalysis result. I told him I would call with this result.     2109 The patient returned my call. I informed him of the UA result and he asked that I call in a prescription to the [facility]'s at [address], [place]. [phone]. I called in Keflex 500 mg tablet, take 1 tablet by mouth twice daily for 7 days.     Findings and plan explained to the Patient. Patient discharged home with instructions regarding supportive care, medications, and reasons to return. The importance of close follow-up was reviewed. The patient was prescribed oxycodone.       Impression & Plan     Medical Decision Making:  Osvaldo Parry is a 64 year old male who presents to the emergency department today at the insistence of family due to a fall 2 days ago.  Please see the HPI and exam for specifics.  The patient was found to have a transverse process fracture of his fourth lumbar vertebrae.  Case was discussed with the spine and brain clinic nurse practitioner who will help arrange follow-up back  bracing and clinic appointment in about 4 weeks for repeat x-rays.  The patient stated that he needed to use the restroom and was interested in providing a urine sample.  This was sent but not resulted at the time of disposition.  The patient stated he was comfortable being discharged and allowing me to call him later with the results.  I did call and leave a message and unfortunately was several hours before the patient called back.  He was able to give me the name of the pharmacy that he wanted his prescription called into and I did call that and as noted above.    The patient should follow-up with his primary care clinic in the outpatient setting.  Anticipatory guidance given prior to discharge.    Discharge Diagnosis:    ICD-10-CM    1. Fall, initial encounter W19.XXXA    2. Abrasion of skin T14.8XXA    3. Contusion of right elbow, initial encounter S50.01XA        Disposition:  The patient is discharged to home.    Discharge Medications:  New Prescriptions    OXYCODONE (ROXICODONE) 5 MG TABLET    Take 1 tablet (5 mg) by mouth every 6 hours as needed for pain       Scribe Disclosure:  I, Chuck Macedo, am serving as a scribe at 2:01 PM on 1/22/2020 to document services personally performed by Que Gilbert DO based on my observations and the provider's statements to me.      1/22/2020   Que Gilbert DO Burns, Bradley Joseph, DO  01/22/20 9414

## 2020-01-23 ENCOUNTER — TELEPHONE (OUTPATIENT)
Dept: INTERNAL MEDICINE | Facility: CLINIC | Age: 65
End: 2020-01-23

## 2020-01-23 ENCOUNTER — TELEPHONE (OUTPATIENT)
Dept: NEUROSURGERY | Facility: CLINIC | Age: 65
End: 2020-01-23

## 2020-01-23 DIAGNOSIS — S32.009A: Primary | ICD-10-CM

## 2020-01-23 RX ORDER — CEPHALEXIN 500 MG/1
500 CAPSULE ORAL 2 TIMES DAILY
Qty: 14 CAPSULE | Refills: 0 | COMMUNITY
Start: 2020-01-23 | End: 2021-08-25

## 2020-01-23 NOTE — TELEPHONE ENCOUNTER
LVM requesting a return call to clinic to set up follow up appointment with XR prior in approximately 4-6 weeks. Patient also may need help with getting brace from orthotics. Order for orthotic's and XR placed, will await return call from patient to help arrange appointments.     L4 spinous process fracture after fall.

## 2020-01-23 NOTE — RESULT ENCOUNTER NOTE
Emergency Dept/Urgent Care discharge antibiotic: Cephalexin (Keflex) 500 mg capsule, 1 capsule (500 mg) by mouth 2 times daily for 7 days.  Date of Rx (If Applicable): 1/22/2020  Recommendations per [place] ED Lab result protocol - Urine culture protocol.

## 2020-01-23 NOTE — TELEPHONE ENCOUNTER
Patient calling because he was seen in the ED yesterday and his INR was abnormal. His arm was bleeding very heavily at the IV site from the ED.UA abnormal also and got a call from the ED saying he needed to start medicine. He is very nervous about everything that is going on with him and wanted to make sure Dr Delgado is aware and talk with his nurse if possible.  Patient will see Dr Delgado in the office 1/24/20.

## 2020-01-23 NOTE — TELEPHONE ENCOUNTER
Spoke with patient.  States he is concerned about the UTI he has.  ER MD unsure if the UTI caused patient to fall down the stairs.  Patient also recently started Xarelto.    He has a future appointment scheduled 1-24-20 and will further discuss with provider.    Per ER dictation 1-22-20, patient was started on Keflex 500mg 1 tab BID x 7 days (this writer entered this medication under current medication list d/t ER provider phoned the prescription to pharmacy and unclear if put medication on current medication list).

## 2020-01-24 ENCOUNTER — OFFICE VISIT (OUTPATIENT)
Dept: INTERNAL MEDICINE | Facility: CLINIC | Age: 65
End: 2020-01-24
Payer: COMMERCIAL

## 2020-01-24 VITALS
SYSTOLIC BLOOD PRESSURE: 138 MMHG | HEART RATE: 90 BPM | RESPIRATION RATE: 16 BRPM | BODY MASS INDEX: 26.48 KG/M2 | OXYGEN SATURATION: 99 % | HEIGHT: 74 IN | TEMPERATURE: 97.2 F | DIASTOLIC BLOOD PRESSURE: 74 MMHG | WEIGHT: 206.3 LBS

## 2020-01-24 DIAGNOSIS — R55 SYNCOPE, UNSPECIFIED SYNCOPE TYPE: Primary | ICD-10-CM

## 2020-01-24 DIAGNOSIS — E11.22 TYPE 2 DIABETES MELLITUS WITH STAGE 3 CHRONIC KIDNEY DISEASE, WITHOUT LONG-TERM CURRENT USE OF INSULIN (H): ICD-10-CM

## 2020-01-24 DIAGNOSIS — E78.5 HYPERLIPIDEMIA LDL GOAL <100: ICD-10-CM

## 2020-01-24 DIAGNOSIS — S32.049S CLOSED FRACTURE OF FOURTH LUMBAR VERTEBRA, UNSPECIFIED FRACTURE MORPHOLOGY, SEQUELA: ICD-10-CM

## 2020-01-24 DIAGNOSIS — D68.51 FACTOR 5 LEIDEN MUTATION, HETEROZYGOUS (H): ICD-10-CM

## 2020-01-24 DIAGNOSIS — N18.30 TYPE 2 DIABETES MELLITUS WITH STAGE 3 CHRONIC KIDNEY DISEASE, WITHOUT LONG-TERM CURRENT USE OF INSULIN (H): ICD-10-CM

## 2020-01-24 LAB
BACTERIA SPEC CULT: ABNORMAL
BACTERIA SPEC CULT: ABNORMAL
Lab: ABNORMAL
SPECIMEN SOURCE: ABNORMAL

## 2020-01-24 PROCEDURE — 99215 OFFICE O/P EST HI 40 MIN: CPT | Performed by: INTERNAL MEDICINE

## 2020-01-24 RX ORDER — OXYCODONE HYDROCHLORIDE 5 MG/1
5 TABLET ORAL EVERY 6 HOURS PRN
Qty: 12 TABLET | Refills: 0 | Status: CANCELLED | OUTPATIENT
Start: 2020-01-24

## 2020-01-24 ASSESSMENT — MIFFLIN-ST. JEOR: SCORE: 1805.52

## 2020-01-24 NOTE — PROGRESS NOTES
Subjective   1520---1600    Osvaldo Parry is a 64 year old male who presents to clinic today for health counseling regarding several health issues. 40 minutes were spent with the patient face to face today, over 50% of which was devoted to health counseling and education regarding these issues.     Patient was seen at [facility] 01/22/2020 due to a fall, abrasion of skin and contusion of right elbow.    [facility]   ED/ Followup:    Facility:  [facility]  Date of visit: 01/22/2020  Reason for visit:  fall, abrasion of skin and contusion of right elbow.  Current Status: stable     Patient presented to the ED on 1/22/2020 after a fall. He went up 15 steps of stairs the night before, then does not recall anything after that. He woke up the following morning at around 5:30 AM at the bottom of the stairs. He does not recall feeling dizzy or near faint that night. No recollection of chest discomfort or palpitations. He had abrasions and contusions to the lower back. Imaging revealed a transverse process fracture of the fourth lumbar vertebrae. He was recommended a back brace and following up with spine and brain clinic in 4 weeks for repeat x-rays.     Patient left a UA sample prior to ED discharge. UA was abnormal and culture was positive for E. Coli and Enterococcus faecalis. He was started on cephalexin.     Today, he reports ongoing back pain. He is going to work which is painful. He also reports difficulty lifting objects with his right arm due to right elbow pain. He is able to extend his right arm just fine at the elbow.     Patient was given oxycodone for pain, 1 tab every 6 hours. He did not fill this prescription. Instead, he's been taking an old rx of vicodin. We discussed that it would be best for him to minimize use of opioids and to taper off of this as soon as possible.     Notes that his last INR was 1.23 and that he had excessive bleeding after IV needles were removed after ED visit. He was  "recently switched from coumadin to Xarelto.     Past/recent records reviewed and discussed for:  -He's noted pus like drainage from his eyes and has concerns about a possible eye infection.   -He has an upcoming appointment with Dr. Nicolas.      Reviewed and updated as needed this visit by Provider         Review of Systems     No dyspnea or cough. No chest discomfort, dizziness or palpitations.   No acute problems with vision or speech, lateralizing weakness or paresthesias.    ROS: as above or negative for constitutional, respiratory, CV, GI, musculoskeletal, derm, neuro systems.    This document serves as a record of the services and decisions personally performed and made by Ugo Delgado MD. It was created on his behalf by Brianne Brock, a trained medical scribe. The creation of this document is based on the provider's statements to the medical scribe.  Brianne Brock January 24, 2020 3:15 PM           Objective    /74 (BP Location: Right arm, Patient Position: Sitting, Cuff Size: Adult Regular)   Pulse 90   Temp 97.2  F (36.2  C) (Oral)   Resp 16   Ht 1.88 m (6' 2\")   Wt 93.6 kg (206 lb 4.8 oz)   SpO2 99%   BMI 26.49 kg/m    Body mass index is 26.49 kg/m .     Physical Exam   GENERAL: healthy, alert and no distress  RESP: lungs clear to auscultation - no rales, rhonchi or wheezes  CV: regular rate and rhythm, normal S1 S2, no S3 or S4, no murmur, click or rub, no peripheral edema and peripheral pulses strong  MS:  no gross musculoskeletal defects noted, no edema  SKIN: Abrasion/contusion noted diffusely in the right paraspinal region. No suspicious lesions or rashes  NEURO: Normal strength and tone, mentation intact and speech normal  PSYCH: mentation appears normal, affect normal/bright    Diagnostic Test Results:  Labs reviewed in Epic        Assessment & Plan   Health counseling regarding several health issues. 40 minutes were spent with the patient face to face today, over 50% of which was " "devoted to health counseling and education regarding these issues.     (R55) Syncope, unspecified syncope type  (primary encounter diagnosis)  Comment: Unclear etiology for recent episode. Recommended a 2 week ziopatch. Follow up with Dr. Nicolas as planned and get echocardiogram as he recommended.   Plan: Zio Patch Holter Adult Pediatric Greater than         48 hrs          (S32.049S) Closed fracture of fourth lumbar vertebra, unspecified fracture morphology, sequela  Comment: Ongoing pain. Limit oxycodone use to 1-2 tabs a day if able. Recommended patient follow up with Sports Medicine.   Plan: Orthopedic & Spine  Referral          (E11.22,  N18.3) Type 2 diabetes mellitus with stage 3 chronic kidney disease, without long-term current use of insulin (H)  Comment: Last A1c at target. Continue current measures.   Plan:     (D68.51) Factor 5 Leiden mutation, heterozygous (H)  Comment: Now on Xarelto.   Plan:     (E78.5) Hyperlipidemia LDL goal <100  Comment: Last LDL within target range. Continue current meds  Plan:      Tobacco Cessation:   reports that he has been smoking. He has been smoking about 0.50 packs per day. He has never used smokeless tobacco.  Tobacco Cessation Action Plan: Information offered: Patient not interested at this time    BMI:   Estimated body mass index is 26.49 kg/m  as calculated from the following:    Height as of this encounter: 1.88 m (6' 2\").    Weight as of this encounter: 93.6 kg (206 lb 4.8 oz).   Weight management plan: Discussed healthy diet and exercise guidelines    FUTURE APPOINTMENTS:       - Follow-up visit in 2/19/2020 as planned    Patient Instructions   You sustained a fracture of the \"posterior spinous process\" of the L4 vertebra, without displaced bony parts or loss of spine structure.     It might be worth seeing the Sports Medicine specialist to see what adjunctive measures might be taken.     Try to limit yourself to one or at most two oxycodone tablets at " "bedtime if needed to sleep.     Last diabetes and  LDL-Cholesterol levels looked fine. Today's blood pressure looks fine.     Getting at the underlying reason for your fall, I'd recommend a 14-day \"Zio-Patch\" cardiac monitor. Order placed for [facility], someone should be calling you to help schedule this.     Recommend seeing the cardiologist and getting the Echocardiogram that he wants to get.     Keep the appointment with me for 2/19/2020. Call Dr Snyder's office to arrange follow up with them.       The information in this document, created by the medical scribe for me, accurately reflects the services I personally performed and the decisions made by me. I have reviewed and approved this document for accuracy prior to leaving the patient care area.  January 24, 2020 3:57 PM    Ugo Delgado MD,   [facility]        "

## 2020-01-24 NOTE — NURSING NOTE
"/74 (BP Location: Right arm, Patient Position: Sitting, Cuff Size: Adult Regular)   Pulse 90   Temp 97.2  F (36.2  C) (Oral)   Resp 16   Ht 1.88 m (6' 2\")   Wt 93.6 kg (206 lb 4.8 oz)   SpO2 99%   BMI 26.49 kg/m    Patient was seen at [facility] 01/22/2020 due to a fall, abrasion of skin and contusion of right elbow.  "

## 2020-01-24 NOTE — PATIENT INSTRUCTIONS
"You sustained a fracture of the \"posterior spinous process\" of the L4 vertebra, without displaced bony parts or loss of spine structure.     It might be worth seeing the Sports Medicine specialist to see what adjunctive measures might be taken.     Try to limit yourself to one or at most two oxycodone tablets at bedtime if needed to sleep.     Last diabetes and  LDL-Cholesterol levels looked fine. Today's blood pressure looks fine.     Getting at the underlying reason for your fall, I'd recommend a 14-day \"Zio-Patch\" cardiac monitor. Order placed for [facility], someone should be calling you to help schedule this.     Recommend seeing the cardiologist and getting the Echocardiogram that he wants to get.     Keep the appointment with me for 2/19/2020. Call Dr Snyder's office to arrange follow up with them.   "

## 2020-01-25 ENCOUNTER — TELEPHONE (OUTPATIENT)
Dept: EMERGENCY MEDICINE | Facility: CLINIC | Age: 65
End: 2020-01-25

## 2020-01-25 DIAGNOSIS — N39.0 URINARY TRACT INFECTION: ICD-10-CM

## 2020-01-25 RX ORDER — NITROFURANTOIN 25; 75 MG/1; MG/1
100 CAPSULE ORAL 2 TIMES DAILY
Qty: 10 CAPSULE | Refills: 0 | Status: SHIPPED | OUTPATIENT
Start: 2020-01-25 | End: 2020-01-30

## 2020-01-25 NOTE — TELEPHONE ENCOUNTER
[facility] Emergency Department Lab result notification [Adult-Male]    [place] ED lab result protocol used  Urine culture    Reason for call  Notify of lab results, assess symptoms,  review ED providers recommendations/discharge instructions (if necessary) and advise per ED lab result f/u protocol    Lab Result (including Rx patient on, if applicable)  Final Urine Culture Report on 1/24/2020.  Emergency Dept/Urgent Care discharge antibiotic prescribed: Cephalexin (Keflex) 500 mg capsule, 1 capsule (500 mg) by mouth 2 times daily for 7 days.  Bacteria #1: 50,000 to 100,000 colonies/mL Escherichia coli  is [SUSCEPTIBLE] to antibiotic  Bacteria #2: 10,000 to 50,000 colonies/mL Enterococcus faecalis  is [NOT TESTED] to antibiotic  Recommendations in treatment per  ED Lab result protocol.    Information table from ED Provider visit on 1/22/2020  Symptoms reported at ED visit (Chief complaint, HPI) Fall and Loss of Consciousness        HPI   Osvaldo Parry is a 64 year old male on xarelto with a history of Factor 5 Leiden, coronary artery disease, hyperlipidemia, and diabetes who presents with fall and loss of consciousness. The patient states that he was going up 16 carpeted stairs 2 nights ago around 2230 and just remembers waking up on the bottom of the stairs the next morning at 0530. He states that he lost consciousness and does not remember falling. The patient complains of low back pain and neck stiffness from the fall. He has had one episode of vomiting. He denies any neck pain or headaches.    Significant Medical hx, if applicable (i.e. CKD, diabetes) Diabetes   Allergies Allergies   Allergen Reactions     Codeine Sulfate Itching     Hydrocodone      Other reaction(s): GI Upset      Weight, if applicable Wt Readings from Last 2 Encounters:   01/24/20 93.6 kg (206 lb 4.8 oz)   01/22/20 91.6 kg (202 lb)      Coumadin/Warfarin [Yes /No] No   Creatinine Level (mg/dl) Creatinine   Date Value Ref  Range Status   01/22/2020 1.57 (H) 0.66 - 1.25 mg/dL Final      Creatinine clearance (ml/min), if applicable Serum creatinine: 1.57 mg/dL (H) 01/22/20 1356  Estimated creatinine clearance: 64.6 mL/min (A)   ED providers Impression and Plan (applicable information) Osvaldo Parry is a 64 year old male who presents to the emergency department today at the insistence of family due to a fall 2 days ago.  Please see the HPI and exam for specifics.  The patient was found to have a transverse process fracture of his fourth lumbar vertebrae.  Case was discussed with the spine and brain clinic nurse practitioner who will help arrange follow-up back bracing and clinic appointment in about 4 weeks for repeat x-rays.  The patient stated that he needed to use the restroom and was interested in providing a urine sample.  This was sent but not resulted at the time of disposition.  The patient stated he was comfortable being discharged and allowing me to call him later with the results.  I did call and leave a message and unfortunately was several hours before the patient called back.  He was able to give me the name of the pharmacy that he wanted his prescription called into and I did call that and as noted above.     The patient should follow-up with his primary care clinic in the outpatient setting.  Anticipatory guidance given prior to discharge.   ED diagnosis 1. Fall, initial encounter W19.XXXA     2. Abrasion of skin T14.8XXA     3. Contusion of right elbow, initial encounter S50.01XA       ED provider Que Gilbert,       RN Assessment (Patient s current Symptoms), include time called.  [Insert Left message here if message left]  2:28PM; Patient returned call. States that he is doing fine. Denies any urinary symptoms. No vomiting and has had no falls.     RN Recommendations/Instructions per [place] ED lab result protocol  Patient notified of lab result and treatment recommendations.  Rx for Nitrofurantoin  Macrocrystal-Monohydrate (Macrobid) 100 mg PO capsule, 1 capsule (100 mg) by mouth 2 times daily for 5 days sent to [Pharmacy - [facility] in [place]].    Advised to stop Keflex and start Macrobid.   Advised to follow up with his PCP as directed by the ED provider.  The patient is comfortable with the information given and has no further questions.     Please Contact your PCP clinic or return to the Emergency department if your:    Symptoms worsen or other concerning symptom's.    PCP follow-up Questions asked: YES         [RN Name]  Carla Rachel RN  [employer] [facility] RN  Lung Nodule and ED Lab Result RN  Epic pool (ED late result f/u RN): [id number]  [facility] INCIDENTAL RADIOLOGY F/U NURSES: [id number]  # [phone]      Copy of Lab result  Urine Culture [[id number]] (Order [id number])   Order Requisition     Urine Culture (Order #[id number]) on 1/22/20   Exam Information     Exam Date Exam Time Accession # Results    1/22/20  5:15 PM [id number]    Component Results     Specimen Information: Midstream Urine        Component Collected Lab   Specimen Description 01/22/2020  5:15    Midstream Urine    Special Requests 01/22/2020  5:15    Specimen received in preservative    Culture Micro Abnormal  01/22/2020  5:15    50,000 to 100,000 colonies/mL   Escherichia coli    Culture Micro Abnormal  01/22/2020  5:15    10,000 to 50,000 colonies/mL   Enterococcus faecalis    Susceptibility     Escherichia coli     Antibiotic Interpretation Sensitivity Method Status   AMPICILLIN Resistant >=32 ug/mL BRISEIDA Final   AMPICILLIN/SULBACTAM Resistant >=32 ug/mL BRISEIDA Final   CEFAZOLIN Sensitive <=4 ug/mL BRISEIDA Final    Cefazolin BRISEIDA breakpoints are for the treatment of uncomplicated urinary tract   infections.  For the treatment of systemic infections, please contact the   laboratory for additional testing.   CEFEPIME Sensitive <=1 ug/mL BRISEIDA Final   CEFOXITIN Sensitive <=4 ug/mL BRISEIDA Final   CEFTAZIDIME Sensitive  <=1 ug/mL BRISEIDA Final   CEFTRIAXONE Sensitive <=1 ug/mL BRISEIDA Final   CIPROFLOXACIN Sensitive <=0.25 ug/mL BRISEIDA Final   GENTAMICIN Sensitive <=1 ug/mL BRISEIDA Final   LEVOFLOXACIN Sensitive <=0.12 ug/mL BRISEIDA Final   NITROFURANTOIN Sensitive <=16 ug/mL BRISEIDA Final   Piperacillin/Tazo Sensitive <=4 ug/mL BRISEIDA Final   TOBRAMYCIN Sensitive <=1 ug/mL BRISEIDA Final   Trimethoprim/Sulfa Sensitive <=1/19 ug/mL BRISEIDA Final   Enterococcus faecalis     Antibiotic Interpretation Sensitivity Method Status   AMPICILLIN Sensitive <=2 ug/mL BRISEIDA Final   NITROFURANTOIN Sensitive <=16 ug/mL BRISEIDA Final   PENICILLIN Sensitive 4 ug/mL BRISEIDA Final   VANCOMYCIN Sensitive 2 ug/mL BRISEIDA Final    Condensed View

## 2020-01-27 NOTE — TELEPHONE ENCOUNTER
LVM again requesting a return call to schedule follow up appointment and ensure patient has received a brace.

## 2020-01-28 ENCOUNTER — TELEPHONE (OUTPATIENT)
Dept: NEUROSURGERY | Facility: CLINIC | Age: 65
End: 2020-01-28

## 2020-01-28 NOTE — TELEPHONE ENCOUNTER
Patient LVM on nurse line but did not leave any further information.     Called patient back. LVM. Awaiting return call.

## 2020-01-28 NOTE — TELEPHONE ENCOUNTER
Patient returned call to nurse line. Per notes from Jannette LOPEZ, she called patient to schedule follow-up appt with XR prior in 4-6 weeks. Also wanted to confirm if patient received brace from [facility]. Orders have already been placed for XR and [facility].    Patient states he does not have a brace yet. Provided him with the # for scheduling appt with [facility] in order to receive brace. Scheduled patient to follow-up with our care team in 4-6 weeks with XR prior.     Advised patient to call back with any questions/concerns.

## 2020-01-29 ENCOUNTER — TELEPHONE (OUTPATIENT)
Dept: INTERNAL MEDICINE | Facility: CLINIC | Age: 65
End: 2020-01-29

## 2020-01-29 DIAGNOSIS — S32.049S CLOSED FRACTURE OF FOURTH LUMBAR VERTEBRA, UNSPECIFIED FRACTURE MORPHOLOGY, SEQUELA: Primary | ICD-10-CM

## 2020-01-29 RX ORDER — OXYCODONE AND ACETAMINOPHEN 5; 325 MG/1; MG/1
1-2 TABLET ORAL EVERY 4 HOURS PRN
Qty: 12 TABLET | Refills: 0 | Status: SHIPPED | OUTPATIENT
Start: 2020-01-29 | End: 2020-11-11

## 2020-01-29 NOTE — TELEPHONE ENCOUNTER
Patient calls asking for Percocet rather than the Oxycodone he received in the hospital.  Patient states they are more effective. Please send to pharmacy.

## 2020-02-03 ENCOUNTER — NURSE TRIAGE (OUTPATIENT)
Dept: INTERNAL MEDICINE | Facility: CLINIC | Age: 65
End: 2020-02-03

## 2020-02-03 NOTE — TELEPHONE ENCOUNTER
Patient calling and last 4 days he has had ringing in his ears and vertigo. Please advise what he should do. Ok to call and shira [phone]

## 2020-02-03 NOTE — TELEPHONE ENCOUNTER
"Contacted patient.   He states that he thinks he has an ear infection and having right ear ringing with headaches and ear feels plugged. Vertigo started about the same time and worse after showering and sudden head movements. Symptoms started after he saw Dr Delgado for syncope follow-up. Ear ringing and vertigo both started to intensify over the past week. No drainage from ears or fevers. He has had similar symptoms before with ear infection and asks about getting an ear drop for this.     Patient advised to be seen in the next few days to have his ears evaluated for an infection, provider would not be able to order this over the phone. Offered appointment with another provider or in another office, patient states he wants to see how he feels tomorrow before scheduling. Patient states that he will call the clinic tomorrow and request appointment if he does not feel any better.      Additional Information    Negative: Patient sounds very sick or weak to the triager    Negative: Taking aspirin and dosage sounds high (i.e., > 1500 mg/day)    Negative: Hearing loss in one or both ears and sudden onset and present now    Symptoms only or mainly in 1 ear (unilateral tinnitus)    Negative: MODERATE-SEVERE tinnitus (i.e., interferes with work, school, or sleep)    Negative: Ear is painful    Negative: Decreased hearing followed sudden, extremely loud noise (e.g., explosion, not just loud concert)    Negative: Taking medication that can damage hearing (i.e., gentamycin, tobramycin, furosemide, ethacrynic acid, cisplatin, quinidine)    Negative: Followed an ear injury    Negative: Hearing loss is main symptom    Answer Assessment - Initial Assessment Questions  1. DESCRIPTION: \"Describe the sound you are hearing.\" (e.g., hissing, humming, pounding, ringing)      ringing  2. LOCATION: \"One or both ears?\" If one, ask: \"Which ear?\"      Right ear  3. SEVERITY: \"How bad is it?\"     - MILD - doesn't interfere with normal " "activities, only can hear in a quiet room     - MODERATE-SEVERE (Bothersome): interferes with work, school, sleep, or other activities       mild  4. ONSET: \"When did this begin?\" \"Did it start suddenly or come on gradually?\"      Intensifying over the last week  5. PATTERN: \"Does this come and go, or has it been constant since it started?\"      Ringing is constant  6. HEARING LOSS: \"Is your hearing decreased?\" (e.g., normal, decreased)        Yes decreased hearing  7. OTHER SYMPTOMS: \"Do you have any other symptoms?\" (e.g., dizziness, earache)      Dizziness, earache  8. PREGNANCY: \"Is there any chance you are pregnant?\" \"When was your last menstrual period?\"      n/a    Protocols used: TINNITUS-A-OH      "

## 2020-02-27 ENCOUNTER — TELEPHONE (OUTPATIENT)
Dept: INTERNAL MEDICINE | Facility: CLINIC | Age: 65
End: 2020-02-27

## 2020-02-27 DIAGNOSIS — E11.22 TYPE 2 DIABETES MELLITUS WITH STAGE 3 CHRONIC KIDNEY DISEASE, WITHOUT LONG-TERM CURRENT USE OF INSULIN (H): ICD-10-CM

## 2020-02-27 DIAGNOSIS — N18.30 TYPE 2 DIABETES MELLITUS WITH STAGE 3 CHRONIC KIDNEY DISEASE, WITHOUT LONG-TERM CURRENT USE OF INSULIN (H): ICD-10-CM

## 2020-02-27 RX ORDER — BLOOD GLUCOSE CONTROL HIGH,LOW
EACH MISCELLANEOUS
Qty: 1 BOTTLE | Refills: 1 | Status: SHIPPED | OUTPATIENT
Start: 2020-02-27

## 2020-02-27 RX ORDER — GLUCOSAMINE HCL/CHONDROITIN SU 500-400 MG
CAPSULE ORAL
Qty: 100 EACH | Refills: 6 | Status: SHIPPED | OUTPATIENT
Start: 2020-02-27 | End: 2020-12-16

## 2020-02-27 RX ORDER — BLOOD-GLUCOSE METER
EACH MISCELLANEOUS
Qty: 1 KIT | Refills: 0 | Status: SHIPPED | OUTPATIENT
Start: 2020-02-27

## 2020-02-27 NOTE — TELEPHONE ENCOUNTER
Last office visit 1-24-20    New prescriptions for Accu-chek Meera plus device with supplies e-scribed to pharmacy.

## 2020-03-23 DIAGNOSIS — E78.5 HYPERLIPIDEMIA LDL GOAL <100: ICD-10-CM

## 2020-03-23 DIAGNOSIS — N18.30 CKD (CHRONIC KIDNEY DISEASE) STAGE 3, GFR 30-59 ML/MIN (H): ICD-10-CM

## 2020-03-23 DIAGNOSIS — I25.10 CORONARY ARTERY DISEASE INVOLVING NATIVE CORONARY ARTERY OF NATIVE HEART WITHOUT ANGINA PECTORIS: ICD-10-CM

## 2020-03-23 DIAGNOSIS — E11.22 TYPE 2 DIABETES MELLITUS WITH STAGE 3 CHRONIC KIDNEY DISEASE, WITHOUT LONG-TERM CURRENT USE OF INSULIN (H): Primary | ICD-10-CM

## 2020-03-23 DIAGNOSIS — N18.30 TYPE 2 DIABETES MELLITUS WITH STAGE 3 CHRONIC KIDNEY DISEASE, WITHOUT LONG-TERM CURRENT USE OF INSULIN (H): Primary | ICD-10-CM

## 2020-03-23 NOTE — TELEPHONE ENCOUNTER
"Requested Prescriptions   Pending Prescriptions Disp Refills     metoprolol succinate ER (TOPROL-XL) 25 MG 24 hr tablet [Pharmacy Med Name: METOPROLOL ER SUCCINATE 25MG TABS] 90 tablet 1     Sig: TAKE 1 TABLET BY MOUTH EVERY DAY   Last Written Prescription Date:  09/19/2019  Last Fill Quantity: 90,  # refills: 01   Last office visit: 1/24/2020 with prescribing provider:     Future Office Visit:      Beta-Blockers Protocol Passed - 3/23/2020 10:41 AM        Passed - Blood pressure under 140/90 in past 12 months     BP Readings from Last 3 Encounters:   01/24/20 138/74   01/22/20 116/57   09/12/19 110/72                 Passed - Patient is age 6 or older        Passed - Recent (12 mo) or future (30 days) visit within the authorizing provider's specialty     Patient has had an office visit with the authorizing provider or a provider within the authorizing providers department within the previous 12 mos or has a future within next 30 days. See \"Patient Info\" tab in inbasket, or \"Choose Columns\" in Meds & Orders section of the refill encounter.              Passed - Medication is active on med list           lisinopril (ZESTRIL) 2.5 MG tablet [Pharmacy Med Name: LISINOPRIL 2.5MG TABLETS] 90 tablet 1     Sig: TAKE 1 TABLET BY MOUTH EVERY DAY   Last Written Prescription Date:  09/19/2019  Last Fill Quantity: 90,  # refills: 01   Last office visit: 1/24/2020 with prescribing provider:     Future Office Visit:      ACE Inhibitors (Including Combos) Protocol Failed - 3/23/2020 10:41 AM        Failed - Normal serum creatinine on file in past 12 months     Recent Labs   Lab Test 01/22/20  1356   CR 1.57*       Ok to refill medication if creatinine is low          Passed - Blood pressure under 140/90 in past 12 months     BP Readings from Last 3 Encounters:   01/24/20 138/74   01/22/20 116/57   09/12/19 110/72                 Passed - Recent (12 mo) or future (30 days) visit within the authorizing provider's specialty     " "Patient has had an office visit with the authorizing provider or a provider within the authorizing providers department within the previous 12 mos or has a future within next 30 days. See \"Patient Info\" tab in inbasket, or \"Choose Columns\" in Meds & Orders section of the refill encounter.              Passed - Medication is active on med list        Passed - Patient is age 18 or older        Passed - Normal serum potassium on file in past 12 months     Recent Labs   Lab Test 01/22/20  1356   POTASSIUM 3.6                "

## 2020-03-24 DIAGNOSIS — N18.30 TYPE 2 DIABETES MELLITUS WITH STAGE 3 CHRONIC KIDNEY DISEASE, WITHOUT LONG-TERM CURRENT USE OF INSULIN (H): ICD-10-CM

## 2020-03-24 DIAGNOSIS — E11.22 TYPE 2 DIABETES MELLITUS WITH STAGE 3 CHRONIC KIDNEY DISEASE, WITHOUT LONG-TERM CURRENT USE OF INSULIN (H): ICD-10-CM

## 2020-03-24 RX ORDER — LISINOPRIL 2.5 MG/1
TABLET ORAL
Qty: 90 TABLET | Refills: 0 | Status: SHIPPED | OUTPATIENT
Start: 2020-03-24 | End: 2020-07-01

## 2020-03-24 RX ORDER — METOPROLOL SUCCINATE 25 MG/1
TABLET, EXTENDED RELEASE ORAL
Qty: 90 TABLET | Refills: 0 | Status: SHIPPED | OUTPATIENT
Start: 2020-03-24 | End: 2020-07-01

## 2020-03-24 RX ORDER — LIRAGLUTIDE 6 MG/ML
INJECTION SUBCUTANEOUS
Qty: 12 ML | Refills: 0 | Status: SHIPPED | OUTPATIENT
Start: 2020-03-24 | End: 2020-06-04

## 2020-03-24 NOTE — TELEPHONE ENCOUNTER
Requested Prescriptions   Pending Prescriptions Disp Refills     metFORMIN (GLUCOPHAGE) 500 MG tablet  Last Written Prescription Date:  1/21/20  Last Fill Quantity: 120,  # refills: 0   Last office visit: 1/24/2020 with prescribing provider:  Dr. Sandy Sharma Office Visit:     120 tablet 0       There is no refill protocol information for this order        liraglutide (VICTOZA PEN) 18 MG/3ML solution  Last Written Prescription Date:  1/21/20  Last Fill Quantity: 12 ML,  # refills: 0   Last office visit: 1/24/2020 with prescribing provider:  Dr. Sandy Sharma Office Visit:     12 mL 0       There is no refill protocol information for this order

## 2020-03-24 NOTE — TELEPHONE ENCOUNTER
"Per Dr. Delgado's 1/24/2020 office visit note:   \"Return in about 26 days (around 2/19/2020) for Diabetes Recheck\"    Patient cancelled 2/19/2020 office visit. Please advise if okay to refill or if patient should schedule virtual visit.  "

## 2020-03-24 NOTE — TELEPHONE ENCOUNTER
"Needs fasting \"lab only\" appointment, then to schedule a telephone visit or E-visit. Please advise pt.   "

## 2020-03-24 NOTE — TELEPHONE ENCOUNTER
"Requested Prescriptions   Pending Prescriptions Disp Refills     metFORMIN (GLUCOPHAGE) 500 MG tablet 120 tablet 0       Biguanide Agents Failed - 3/24/2020  2:07 PM        Failed - Patient has had a Microalbumin in the past 15 mos.     Recent Labs   Lab Test 08/10/16  1122   MICROL 28   UMALCR 12.97             Failed - Patient has documented A1c within the specified period of time.     If HgbA1C is 8 or greater, it needs to be on file within the past 3 months.  If less than 8, must be on file within the past 6 months.     Recent Labs   Lab Test 09/16/19  0948   A1C 5.8*             Passed - Blood pressure less than 140/90 in past 6 months     BP Readings from Last 3 Encounters:   01/24/20 138/74   01/22/20 116/57   09/12/19 110/72                 Passed - Patient has documented LDL within the past 12 mos.     Recent Labs   Lab Test 09/16/19  0948   LDL 60             Passed - Patient is age 10 or older        Passed - Patient's CR is NOT>1.4 OR Patient's EGFR is NOT<45 within past 12 mos.     Recent Labs   Lab Test 01/22/20  1356   GFRESTIMATED 46*   GFRESTBLACK 54*       Recent Labs   Lab Test 01/22/20  1356   CR 1.57*             Passed - Patient does NOT have a diagnosis of CHF.        Passed - Medication is active on med list        Passed - Recent (6 mo) or future (30 days) visit within the authorizing provider's specialty     Patient had office visit in the last 6 months or has a visit in the next 30 days with authorizing provider or within the authorizing provider's specialty.  See \"Patient Info\" tab in inbasket, or \"Choose Columns\" in Meds & Orders section of the refill encounter.               liraglutide (VICTOZA PEN) 18 MG/3ML solution 12 mL 0       GLP-1 Agonists Protocol Failed - 3/24/2020  2:07 PM        Failed - Microalbumin on file in past 12 months     Recent Labs   Lab Test 08/10/16  1122   MICROL 28   UMALCR 12.97             Failed - HgbA1C in past 3 or 6 months     If HgbA1C is 8 or greater, " "it needs to be on file within the past 3 months.  If less than 8, must be on file within the past 6 months.     Recent Labs   Lab Test 09/16/19  0948   A1C 5.8*             Failed - Normal serum creatinine on file in past 12 months     Recent Labs   Lab Test 01/22/20  1356   CR 1.57*       Ok to refill medication if creatinine is low          Passed - Blood pressure less than 140/90 in past 6 months     BP Readings from Last 3 Encounters:   01/24/20 138/74   01/22/20 116/57   09/12/19 110/72                 Passed - LDL on file in past 12 months     Recent Labs   Lab Test 09/16/19  0948   LDL 60             Passed - Medication is active on med list        Passed - Patient is age 18 or older        Passed - Recent (6 mo) or future (30 days) visit within the authorizing provider's specialty     Patient had office visit in the last 6 months or has a visit in the next 30 days with authorizing provider.  See \"Patient Info\" tab in inbasket, or \"Choose Columns\" in Meds & Orders section of the refill encounter.                 Per provider's last office visit dictation 1/24/20: \"Keep the appointment with me for 2/19/2020.\"    Patient no showed for appointment.    Routing refill request to provider for review/approval because:  Schedule a telephone visit?    Please advise, thanks.          "

## 2020-06-30 DIAGNOSIS — I25.10 CORONARY ARTERY DISEASE INVOLVING NATIVE CORONARY ARTERY WITHOUT ANGINA PECTORIS: ICD-10-CM

## 2020-06-30 DIAGNOSIS — E78.5 HYPERLIPIDEMIA WITH TARGET LDL LESS THAN 100: ICD-10-CM

## 2020-06-30 RX ORDER — NIACIN 500 MG/1
500 TABLET, EXTENDED RELEASE ORAL AT BEDTIME
Qty: 90 TABLET | Refills: 0 | Status: SHIPPED | OUTPATIENT
Start: 2020-06-30 | End: 2020-10-30

## 2020-07-01 DIAGNOSIS — N18.30 CKD (CHRONIC KIDNEY DISEASE) STAGE 3, GFR 30-59 ML/MIN (H): ICD-10-CM

## 2020-07-01 DIAGNOSIS — I25.10 CORONARY ARTERY DISEASE INVOLVING NATIVE CORONARY ARTERY OF NATIVE HEART WITHOUT ANGINA PECTORIS: ICD-10-CM

## 2020-07-01 RX ORDER — METOPROLOL SUCCINATE 25 MG/1
25 TABLET, EXTENDED RELEASE ORAL DAILY
Qty: 90 TABLET | Refills: 1 | Status: SHIPPED | OUTPATIENT
Start: 2020-07-01 | End: 2020-11-11

## 2020-07-01 NOTE — TELEPHONE ENCOUNTER
".  Requested Prescriptions   Pending Prescriptions Disp Refills     lisinopril (ZESTRIL) 2.5 MG tablet 90 tablet 0     Sig: Take 1 tablet (2.5 mg) by mouth daily   Last Written Prescription Date:  3-  Last Fill Quantity: 90,  # refills: 0   Last office visit: 1/24/2020 with prescribing provider:  alex Sharma Office Visit:              ACE Inhibitors (Including Combos) Protocol Failed - 7/1/2020  9:59 AM        Failed - Normal serum creatinine on file in past 12 months     Recent Labs   Lab Test 01/22/20  1356   CR 1.57*       Ok to refill medication if creatinine is low          Passed - Blood pressure under 140/90 in past 12 months     BP Readings from Last 3 Encounters:   01/24/20 138/74   01/22/20 116/57   09/12/19 110/72                 Passed - Recent (12 mo) or future (30 days) visit within the authorizing provider's specialty     Patient has had an office visit with the authorizing provider or a provider within the authorizing providers department within the previous 12 mos or has a future within next 30 days. See \"Patient Info\" tab in inbasket, or \"Choose Columns\" in Meds & Orders section of the refill encounter.              Passed - Medication is active on med list        Passed - Patient is age 18 or older        Passed - Normal serum potassium on file in past 12 months     Recent Labs   Lab Test 01/22/20  1356   POTASSIUM 3.6                metoprolol succinate ER (TOPROL-XL) 25 MG 24 hr tablet 90 tablet 0     Sig: Take 1 tablet (25 mg) by mouth daily   Last Written Prescription Date:  3-  Last Fill Quantity: 90,  # refills: 0   Last office visit: 1/24/2020 with prescribing provider:  Alex Sharma Office Visit:              Beta-Blockers Protocol Passed - 7/1/2020  9:59 AM        Passed - Blood pressure under 140/90 in past 12 months     BP Readings from Last 3 Encounters:   01/24/20 138/74   01/22/20 116/57   09/12/19 110/72                 Passed - Patient is age 6 or older        " "Passed - Recent (12 mo) or future (30 days) visit within the authorizing provider's specialty     Patient has had an office visit with the authorizing provider or a provider within the authorizing providers department within the previous 12 mos or has a future within next 30 days. See \"Patient Info\" tab in inbasket, or \"Choose Columns\" in Meds & Orders section of the refill encounter.              Passed - Medication is active on med list             "

## 2020-07-01 NOTE — TELEPHONE ENCOUNTER
Routing refill request to provider for review/approval because:  Labs out of range:  Creat    Creatinine   Date Value Ref Range Status   01/22/2020 1.57 (H) 0.66 - 1.25 mg/dL Final

## 2020-07-02 RX ORDER — LISINOPRIL 2.5 MG/1
2.5 TABLET ORAL DAILY
Qty: 90 TABLET | Refills: 0 | Status: SHIPPED | OUTPATIENT
Start: 2020-07-02 | End: 2020-10-09

## 2020-07-11 NOTE — TELEPHONE ENCOUNTER
Last OV-8/10/16    Last FLP-8/10/16      Lab Results   Component Value Date    A1C 6.1 08/10/2016    A1C 5.9 10/08/2015    A1C 6.0 03/24/2015    A1C 5.7 09/19/2014    A1C 7.0 03/04/2014        11-Jul-2020 23:50

## 2020-08-04 DIAGNOSIS — N18.30 TYPE 2 DIABETES MELLITUS WITH STAGE 3 CHRONIC KIDNEY DISEASE, WITHOUT LONG-TERM CURRENT USE OF INSULIN (H): ICD-10-CM

## 2020-08-04 DIAGNOSIS — E11.22 TYPE 2 DIABETES MELLITUS WITH STAGE 3 CHRONIC KIDNEY DISEASE, WITHOUT LONG-TERM CURRENT USE OF INSULIN (H): ICD-10-CM

## 2020-08-05 NOTE — TELEPHONE ENCOUNTER
Pending Prescriptions:                       Disp   Refills    metFORMIN (GLUCOPHAGE) 500 MG tablet       120 ta*0        Sig: TAKE 1 TABLET BY MOUTH TWICE DAILY WITH MEALS    liraglutide (VICTOZA PEN) 18 MG/3ML soluti*12 mL  0        Sig: INJECT 1.2 MG UNDER THE SKIN ONCE DAILY    Routing refill request to provider for review/approval because:  Patient fails protocol

## 2020-08-08 RX ORDER — LIRAGLUTIDE 6 MG/ML
INJECTION SUBCUTANEOUS
Qty: 12 ML | Refills: 0 | Status: SHIPPED | OUTPATIENT
Start: 2020-08-08 | End: 2020-10-13

## 2020-09-17 ENCOUNTER — TELEPHONE (OUTPATIENT)
Dept: CARDIOLOGY | Facility: CLINIC | Age: 65
End: 2020-09-17

## 2020-09-17 NOTE — TELEPHONE ENCOUNTER
PATIENT WELLNESS TELEPHONE SCREENING     Step 1 Screening Questions    In the past 3 weeks, have you been exposed to someone with a suspected or known illness?  COVID-19? No  Chickenpox? No   Measles? No  Pertussis? No    Do you have one of the following NEW symptoms?   Fever/Chills? No   Cough? No   Shortness of breath? No   New loss of taste or smell? No  Sore throat? No  Muscle or body aches? No  Headaches? No  Fatigue? No  Vomiting or diarrhea? No    Have you had a positive COVID-19 diagnostic test (nasal swab test) in the last 14 days or are you currently on self-quarantine restrictions (i.e. travel restrictions, exposures, etc.)?No    Step 2 Screening Results (Skip if the patient is negative for symptoms), If Yes:    Due to your current symptoms, for our safety we need to cancel your appointment. We are advising that you consult with your ordering provider or [facility] at [phone].    Step 3 Review Visitor Policy  Patient informed of the updated visitor policy   1 visitor allowed per patient   Visitor must screen negative for COVID symptoms   Visitor must wear a mask

## 2020-09-25 ENCOUNTER — HOSPITAL ENCOUNTER (OUTPATIENT)
Dept: CARDIOLOGY | Facility: CLINIC | Age: 65
Discharge: HOME | End: 2020-09-25
Attending: INTERNAL MEDICINE | Admitting: INTERNAL MEDICINE
Payer: COMMERCIAL

## 2020-09-25 ENCOUNTER — TELEPHONE (OUTPATIENT)
Dept: CARDIOLOGY | Facility: CLINIC | Age: 65
End: 2020-09-25

## 2020-09-25 DIAGNOSIS — I71.20 ANEURYSM, AORTA, THORACIC (H): ICD-10-CM

## 2020-09-25 PROCEDURE — 93306 TTE W/DOPPLER COMPLETE: CPT

## 2020-09-25 PROCEDURE — 93306 TTE W/DOPPLER COMPLETE: CPT | Mod: 26 | Performed by: INTERNAL MEDICINE

## 2020-09-25 PROCEDURE — 25500064 ZZH RX 255 OP 636: Performed by: INTERNAL MEDICINE

## 2020-09-25 RX ADMIN — HUMAN ALBUMIN MICROSPHERES AND PERFLUTREN 9 ML: 10; .22 INJECTION, SOLUTION INTRAVENOUS at 11:17

## 2020-09-25 NOTE — TELEPHONE ENCOUNTER
Wellness Screening Tool    Symptom Screening:    Do you have one of the following NEW symptoms:      Fever (subjective or >100.0)?  No    New cough? No    Shortness of breath? No    Chills? No    New loss of taste or smell? No    Generalized body aches? No    New persistent headache? No    New sore throat? No    Nausea, vomiting or diarrhea? No    Within the past 2 weeks, have you been exposed to someone with a known positive illness below?      COVID - 19 (known or suspected) No    Chicken pox?  No    Measles? No    Pertussis? No    Have you had a positive COVID-19 diagnostic test (nasal swab test) in the last 14 days or are you currently   on self-quarantine restrictions (i.e.travel restriction, exposure, etc?) No        Patient notified of visitor restriction: yes  Patient informed to wear a mask: Yes    Patient's appointment status: Patient will be seen in clinic as scheduled on 9/28/2020

## 2020-09-29 ENCOUNTER — TELEPHONE (OUTPATIENT)
Dept: CARDIOLOGY | Facility: CLINIC | Age: 65
End: 2020-09-29

## 2020-09-29 NOTE — TELEPHONE ENCOUNTER
Echocardiogram has follow up with Dr. Nicolas 11-2-2020  No change from previous echo  Interpretation Summary     The left ventricle is normal in size.  Left ventricular systolic function is low normal.  The visual ejection fraction is estimated at 55%.  Normal left ventricular wall motion  Mild aortic root dilatation.  The ascending aorta is Mildly dilated.  Compared to prior study, there is no significant change.

## 2020-10-07 NOTE — TELEPHONE ENCOUNTER
Discharge Instructions for Abdominal Surgery  Abdominal surgery is done through an incision in your belly. It may take a few weeks or longer to heal from the surgery. This sheet gives instructions on how to care for yourself once you’re home.    Medicines  Here is what to expect:  · You may be prescribed pain medicine. Do not wait until your pain becomes severe before taking the medicine. It may not work as well if you wait too long to take it between doses.  · Most surgeons prescribe stool softeners along with opioid prescriptions. Take these as prescribed.   · You may be prescribed antibiotics to help treat or prevent infection. Be sure to take all of the antibiotics even if you start to feel better.    Diet  Dietary tips include:  · Follow any diet instructions given by your healthcare provider. You may need to start with liquids and then slowly add solid foods back into your diet.   · If you have constipation, your healthcare provider may tell you to add more fiber to your diet. You may also be told to use a laxative or stool softener such as Miralax or Milk of Magnesia. These can often be bought over the counter.  · Drink plenty of fluids.    Activity  Recommendations include the following:  · Rest as often as needed.  · Ask your healthcare provider when you can shower or bathe. Have someone nearby in case you need help.  · Ask your family and friends to help with chores and errands.  · Don’t mow the lawn, vacuum, or do any strenuous activities for 4 to 6 weeks.  · Avoid lifting anything over 10 pounds for 4 to 6 weeks.  · Avoid driving until your healthcare provider says it’s OK.  · Walk as often as you feel able.  · Do the coughing and breathing exercises you were taught in the hospital. If you were given an incentive spirometer to help with breathing, use it as directed. This is important and will help prevent lung infections.   · Ask your healthcare provider when you can return to work.    Incision and  Pt seen for INR 10/8/18     drain care  Do's and don'ts include the following:  · Keep your incision clean and dry. It’s OK to wash the skin around your incision with mild soap and water.  · If you have a dressing over your incision, change it as you were told. Replace the dressing if it becomes wet or dirty. In most cases, the dressing can be removed after 48 hours.  · If you have a drain, record the amount of drainage daily. You may also need to empty the drain and clean the attached tubing daily. Check with your healthcare provider if you can get your drain wet or if it needs to stay dry at all times.  · Don’t sit in a bathtub, pool, or hot tub until your incision is closed and any drains are removed.  · When coughing or sneezing, hold a pillow firmly against your incision with both hands. This is called “splinting.” Doing this helps protect your incision and decreases belly discomfort.  · Avoid picking, scratching, or pulling at your incision.  · Don’t use oils, or creams on your incision. Ask your healthcare provider before using lotions on your incision.    Follow-up  You will have one or more follow-up visits with your healthcare provider. These are needed to check how well you’re healing. Your drain, stitches, or staples may also be removed during these visits.    When to call the healthcare provider  Call your healthcare provider right away if you have any of the following:  · Fever of 100.4°F (38°C) or higher, or as advised by your healthcare provider  · Chest pain or trouble breathing  · Pain or tenderness in the leg  · Increased pain, redness, swelling, bleeding, or foul-smelling drainage at the incision site  · Incision separates or comes apart  · Pain or hardness in your belly that gets worse or isn’t relieved by pain medicine  · Nausea and vomiting that won’t go away  · Diarrhea that lasts more than 3 days  · Constipation or inability to pass gas for more than 3 days  · Dark-colored or bloody urine  · Bright red or dark black  stools  · Itchy, swollen skin; skin rash

## 2020-10-26 DIAGNOSIS — I25.10 CORONARY ARTERY DISEASE INVOLVING NATIVE CORONARY ARTERY WITHOUT ANGINA PECTORIS: ICD-10-CM

## 2020-10-26 DIAGNOSIS — I25.10 CORONARY ARTERY DISEASE INVOLVING NATIVE CORONARY ARTERY OF NATIVE HEART WITHOUT ANGINA PECTORIS: Primary | ICD-10-CM

## 2020-10-26 DIAGNOSIS — E78.5 HYPERLIPIDEMIA WITH TARGET LDL LESS THAN 100: ICD-10-CM

## 2020-10-26 NOTE — TELEPHONE ENCOUNTER
"Requested Prescriptions   Pending Prescriptions Disp Refills     niacin ER (NIASPAN) 500 MG CR tablet  Last Written Prescription Date:  0630/20  Last Fill Quantity: 90,  # refills: 0   Last office visit: 1/24/2020 with prescribing provider:  01/24/20   Future Office Visit:   Next 5 appointments (look out 90 days)    Nov 02, 2020 11:15 AM  Return Visit with Joseph Nicolas MD  [facility] ([facility]) [address] [place], [place] [id number]  [facility] [address] [phone] OPT 2   Nov 06, 2020  3:00 PM  PHYSICAL with Ugo Delgado MD  [facility] ([facility]) [address]  [facility] [address] [phone]                90 tablet 0     Sig: Take 1 tablet (500 mg) by mouth At Bedtime       Antihyperlipidemic agents Failed - 10/26/2020  8:07 AM        Failed - Lipid panel on file in past 12 mos     Recent Labs   Lab Test 09/16/19  0948 10/08/15  0928 10/08/15  0928   CHOL 125   < > 133   TRIG 208*   < > 152*   HDL 23*   < > 24*   LDL 60   < > 79   NHDL 102   < >  --    VLDL  --   --  30   CHOLHDLRATIO  --   --  5.5*    < > = values in this interval not displayed.               Failed - Normal serum ALT on record in past 12 mos     Recent Labs   Lab Test 09/16/19  0948   ALT 34             Passed - Recent (12 mo) or future (30 days) visit within the authorizing provider's specialty     Patient has had an office visit with the authorizing provider or a provider within the authorizing providers department within the previous 12 mos or has a future within next 30 days. See \"Patient Info\" tab in inbasket, or \"Choose Columns\" in Meds & Orders section of the refill encounter.              Passed - Medication is active on med list        Passed - Patient is age 18 years or older             "

## 2020-10-27 DIAGNOSIS — E78.5 HYPERLIPIDEMIA LDL GOAL <100: ICD-10-CM

## 2020-10-28 RX ORDER — ATORVASTATIN CALCIUM 20 MG/1
20 TABLET, FILM COATED ORAL DAILY
Qty: 90 TABLET | Refills: 0 | Status: SHIPPED | OUTPATIENT
Start: 2020-10-28 | End: 2020-11-25

## 2020-10-28 NOTE — TELEPHONE ENCOUNTER
Next 5 appointments (look out 90 days)    Nov 02, 2020 11:15 AM  Return Visit with Joseph Nicolas MD  [facility] ([facility]) [address] [id number]  [facility] [address] [phone] OPT 2   Nov 06, 2020  3:00 PM  PHYSICAL with Ugo Delgado MD  [facility] ([facility]) [address]  [facility] [id number] [phone]         Medication is being filled for 1 time refill only due to:  Future appointment scheduled

## 2020-10-28 NOTE — TELEPHONE ENCOUNTER
Routing refill request to provider for review/approval because:  Aline given x1 and patient did not follow up, please advise  Labs out of range:  Lipid panel, ALT

## 2020-10-30 RX ORDER — NIACIN 500 MG/1
500 TABLET, EXTENDED RELEASE ORAL AT BEDTIME
Qty: 90 TABLET | Refills: 0 | Status: SHIPPED | OUTPATIENT
Start: 2020-10-30 | End: 2020-11-11

## 2020-11-11 ENCOUNTER — OFFICE VISIT (OUTPATIENT)
Dept: INTERNAL MEDICINE | Facility: CLINIC | Age: 65
End: 2020-11-11
Payer: COMMERCIAL

## 2020-11-11 VITALS
BODY MASS INDEX: 25.57 KG/M2 | HEART RATE: 108 BPM | TEMPERATURE: 97.5 F | RESPIRATION RATE: 18 BRPM | DIASTOLIC BLOOD PRESSURE: 66 MMHG | HEIGHT: 74 IN | OXYGEN SATURATION: 100 % | SYSTOLIC BLOOD PRESSURE: 103 MMHG | WEIGHT: 199.2 LBS

## 2020-11-11 DIAGNOSIS — Z12.5 SCREENING PSA (PROSTATE SPECIFIC ANTIGEN): ICD-10-CM

## 2020-11-11 DIAGNOSIS — K08.89 PAIN, DENTAL: ICD-10-CM

## 2020-11-11 DIAGNOSIS — E11.22 TYPE 2 DIABETES MELLITUS WITH STAGE 3 CHRONIC KIDNEY DISEASE, WITHOUT LONG-TERM CURRENT USE OF INSULIN, UNSPECIFIED WHETHER STAGE 3A OR 3B CKD (H): ICD-10-CM

## 2020-11-11 DIAGNOSIS — I25.10 CORONARY ARTERY DISEASE INVOLVING NATIVE CORONARY ARTERY OF NATIVE HEART WITHOUT ANGINA PECTORIS: ICD-10-CM

## 2020-11-11 DIAGNOSIS — N18.30 TYPE 2 DIABETES MELLITUS WITH STAGE 3 CHRONIC KIDNEY DISEASE, WITHOUT LONG-TERM CURRENT USE OF INSULIN, UNSPECIFIED WHETHER STAGE 3A OR 3B CKD (H): ICD-10-CM

## 2020-11-11 DIAGNOSIS — E78.5 HYPERLIPIDEMIA LDL GOAL <100: ICD-10-CM

## 2020-11-11 DIAGNOSIS — Z00.00 ROUTINE GENERAL MEDICAL EXAMINATION AT A HEALTH CARE FACILITY: Primary | ICD-10-CM

## 2020-11-11 DIAGNOSIS — R53.83 FATIGUE, UNSPECIFIED TYPE: ICD-10-CM

## 2020-11-11 DIAGNOSIS — D73.5 SPLENIC INFARCTION: ICD-10-CM

## 2020-11-11 DIAGNOSIS — Q21.12 PATENT FORAMEN OVALE WITH RIGHT TO LEFT SHUNT: ICD-10-CM

## 2020-11-11 DIAGNOSIS — F43.21 ADJUSTMENT DISORDER WITH DEPRESSED MOOD: ICD-10-CM

## 2020-11-11 DIAGNOSIS — D68.51 FACTOR 5 LEIDEN MUTATION, HETEROZYGOUS (H): ICD-10-CM

## 2020-11-11 LAB
ERYTHROCYTE [DISTWIDTH] IN BLOOD BY AUTOMATED COUNT: 13 % (ref 10–15)
HBA1C MFR BLD: 5.9 % (ref 0–5.6)
HCT VFR BLD AUTO: 39.9 % (ref 40–53)
HGB BLD-MCNC: 12.9 G/DL (ref 13.3–17.7)
MCH RBC QN AUTO: 31.2 PG (ref 26.5–33)
MCHC RBC AUTO-ENTMCNC: 32.3 G/DL (ref 31.5–36.5)
MCV RBC AUTO: 96 FL (ref 78–100)
PLATELET # BLD AUTO: 269 10E9/L (ref 150–450)
RBC # BLD AUTO: 4.14 10E12/L (ref 4.4–5.9)
WBC # BLD AUTO: 5.3 10E9/L (ref 4–11)

## 2020-11-11 PROCEDURE — 99396 PREV VISIT EST AGE 40-64: CPT | Performed by: INTERNAL MEDICINE

## 2020-11-11 PROCEDURE — 99214 OFFICE O/P EST MOD 30 MIN: CPT | Mod: 25 | Performed by: INTERNAL MEDICINE

## 2020-11-11 PROCEDURE — 82043 UR ALBUMIN QUANTITATIVE: CPT | Performed by: INTERNAL MEDICINE

## 2020-11-11 PROCEDURE — 80061 LIPID PANEL: CPT | Performed by: INTERNAL MEDICINE

## 2020-11-11 PROCEDURE — 85027 COMPLETE CBC AUTOMATED: CPT | Performed by: INTERNAL MEDICINE

## 2020-11-11 PROCEDURE — 84443 ASSAY THYROID STIM HORMONE: CPT | Performed by: INTERNAL MEDICINE

## 2020-11-11 PROCEDURE — 36415 COLL VENOUS BLD VENIPUNCTURE: CPT | Performed by: INTERNAL MEDICINE

## 2020-11-11 PROCEDURE — 80053 COMPREHEN METABOLIC PANEL: CPT | Performed by: INTERNAL MEDICINE

## 2020-11-11 PROCEDURE — 83036 HEMOGLOBIN GLYCOSYLATED A1C: CPT | Performed by: INTERNAL MEDICINE

## 2020-11-11 PROCEDURE — G0103 PSA SCREENING: HCPCS | Performed by: INTERNAL MEDICINE

## 2020-11-11 RX ORDER — NIACIN 500 MG/1
500 TABLET, EXTENDED RELEASE ORAL AT BEDTIME
Qty: 90 TABLET | Refills: 0
Start: 2020-11-11 | End: 2021-02-09

## 2020-11-11 RX ORDER — OXYCODONE AND ACETAMINOPHEN 5; 325 MG/1; MG/1
1 TABLET ORAL EVERY 6 HOURS PRN
Qty: 10 TABLET | Refills: 0 | Status: SHIPPED | OUTPATIENT
Start: 2020-11-11

## 2020-11-11 RX ORDER — METOPROLOL SUCCINATE 25 MG/1
25 TABLET, EXTENDED RELEASE ORAL DAILY
Qty: 90 TABLET | Refills: 1 | Status: SHIPPED | OUTPATIENT
Start: 2020-11-11 | End: 2021-08-17

## 2020-11-11 SDOH — SOCIAL STABILITY: SOCIAL NETWORK: IN A TYPICAL WEEK, HOW MANY TIMES DO YOU TALK ON THE PHONE WITH FAMILY, FRIENDS, OR NEIGHBORS?: NOT ASKED

## 2020-11-11 SDOH — SOCIAL STABILITY: SOCIAL NETWORK: HOW OFTEN DO YOU GET TOGETHER WITH FRIENDS OR RELATIVES?: NOT ASKED

## 2020-11-11 SDOH — SOCIAL STABILITY: SOCIAL INSECURITY: WITHIN THE LAST YEAR, HAVE YOU BEEN AFRAID OF YOUR PARTNER OR EX-PARTNER?: NOT ASKED

## 2020-11-11 SDOH — SOCIAL STABILITY: SOCIAL INSECURITY
WITHIN THE LAST YEAR, HAVE YOU BEEN KICKED, HIT, SLAPPED, OR OTHERWISE PHYSICALLY HURT BY YOUR PARTNER OR EX-PARTNER?: NOT ASKED

## 2020-11-11 SDOH — ECONOMIC STABILITY: FOOD INSECURITY: WITHIN THE PAST 12 MONTHS, THE FOOD YOU BOUGHT JUST DIDN'T LAST AND YOU DIDN'T HAVE MONEY TO GET MORE.: NEVER TRUE

## 2020-11-11 SDOH — SOCIAL STABILITY: SOCIAL INSECURITY
WITHIN THE LAST YEAR, HAVE TO BEEN RAPED OR FORCED TO HAVE ANY KIND OF SEXUAL ACTIVITY BY YOUR PARTNER OR EX-PARTNER?: NOT ASKED

## 2020-11-11 SDOH — SOCIAL STABILITY: SOCIAL NETWORK
DO YOU BELONG TO ANY CLUBS OR ORGANIZATIONS SUCH AS CHURCH GROUPS UNIONS, FRATERNAL OR ATHLETIC GROUPS, OR SCHOOL GROUPS?: NOT ASKED

## 2020-11-11 SDOH — ECONOMIC STABILITY: TRANSPORTATION INSECURITY
IN THE PAST 12 MONTHS, HAS THE LACK OF TRANSPORTATION KEPT YOU FROM MEDICAL APPOINTMENTS OR FROM GETTING MEDICATIONS?: NO

## 2020-11-11 SDOH — SOCIAL STABILITY: SOCIAL NETWORK: HOW OFTEN DO YOU ATTEND CHURCH OR RELIGIOUS SERVICES?: NOT ASKED

## 2020-11-11 SDOH — ECONOMIC STABILITY: TRANSPORTATION INSECURITY
IN THE PAST 12 MONTHS, HAS LACK OF TRANSPORTATION KEPT YOU FROM MEETINGS, WORK, OR FROM GETTING THINGS NEEDED FOR DAILY LIVING?: NO

## 2020-11-11 SDOH — SOCIAL STABILITY: SOCIAL INSECURITY
WITHIN THE LAST YEAR, HAVE YOU BEEN HUMILIATED OR EMOTIONALLY ABUSED IN OTHER WAYS BY YOUR PARTNER OR EX-PARTNER?: NOT ASKED

## 2020-11-11 SDOH — SOCIAL STABILITY: SOCIAL NETWORK: ARE YOU MARRIED, WIDOWED, DIVORCED, SEPARATED, NEVER MARRIED, OR LIVING WITH A PARTNER?: WIDOWED

## 2020-11-11 SDOH — ECONOMIC STABILITY: FOOD INSECURITY: WITHIN THE PAST 12 MONTHS, YOU WORRIED THAT YOUR FOOD WOULD RUN OUT BEFORE YOU GOT MONEY TO BUY MORE.: NEVER TRUE

## 2020-11-11 SDOH — ECONOMIC STABILITY: INCOME INSECURITY: HOW HARD IS IT FOR YOU TO PAY FOR THE VERY BASICS LIKE FOOD, HOUSING, MEDICAL CARE, AND HEATING?: SOMEWHAT HARD

## 2020-11-11 SDOH — SOCIAL STABILITY: SOCIAL NETWORK: HOW OFTEN DO YOU ATTENT MEETINGS OF THE CLUB OR ORGANIZATION YOU BELONG TO?: NOT ASKED

## 2020-11-11 ASSESSMENT — ENCOUNTER SYMPTOMS
CONSTIPATION: 0
CHILLS: 0
HEMATURIA: 0
HEMATOCHEZIA: 0
ABDOMINAL PAIN: 0
DIARRHEA: 0
COUGH: 0

## 2020-11-11 ASSESSMENT — MIFFLIN-ST. JEOR: SCORE: 1768.32

## 2020-11-11 ASSESSMENT — PATIENT HEALTH QUESTIONNAIRE - PHQ9: SUM OF ALL RESPONSES TO PHQ QUESTIONS 1-9: 16

## 2020-11-11 NOTE — PROGRESS NOTES
SUBJECTIVE:   CC: Osvaldo Parry is an 65 year old male who presents for preventative health visit.       Patient has been advised of split billing requirements and indicates understanding: Yes  Healthy Habits:     Getting at least 3 servings of Calcium per day:  NO    Bi-annual eye exam:  NO    Dental care twice a year:  NO    Sleep apnea or symptoms of sleep apnea:  None    Diet:  Regular (no restrictions)    Frequency of exercise:  None    Taking medications regularly:  Yes    Medication side effects:  None    PHQ-2 Total Score: 2    Additional concerns today:  No      PROBLEMS TO ADD ON...In addition to an Annual Wellness Exam, we addressed Type 2 diabetes mellitus, hyperlipidemia, hypertension, coronary artery disease, chronic oral anticoagulation, depressive symptoms.     The patient reports that his glucoses have typically been in the 120-125 range when checked.  His A1c was quite favorable at 5.9.  However, his weight and appetite have diminished, and we discussed at least temporarily stopping the Victoza.    He is tolerating his current medications.  His LDL cholesterol has returned after his departure, and also appears favorable at 87.     He has not been bothered by chest discomfort dizziness or palpitations.  He has had some degree of fatigue.  No recurrent syncope. He is urged to follow through on getting the cardiac monitor placed with cardiology.     He reports having some dental pain for which he requests a very small number of oxycodone tablets.    He has had some depressive symptoms.  He has had much less steady work as an  in the midst of this pandemic.  His PHQ-9 score is +16.  We discussed benefits and limitations as well as potential side effects of an SSRI medication, and he agrees to try taking Prozac.    Today's PHQ-2 Score:   PHQ-2 ( 1999 Pfizer) 1/24/2020   Q1: Little interest or pleasure in doing things 0   Q2: Feeling down, depressed or hopeless 0   PHQ-2 Score 0  "      Abuse: Current or Past(Physical, Sexual or Emotional)- No  Do you feel safe in your environment? Yes      Social History     Tobacco Use     Smoking status: Current Every Day Smoker     Packs/day: 0.50     Smokeless tobacco: Never Used   Substance Use Topics     Alcohol use: No     Alcohol/week: 0.0 standard drinks     Comment: rare drink         No flowsheet data found.    Last PSA: No results found for: PSA    Reviewed orders with patient. Reviewed health maintenance and updated orders accordingly - Yes      Reviewed and updated as needed this visit by clinical staff                 Reviewed and updated as needed this visit by Provider                  Review of Systems   Constitutional: Negative for chills.   HENT: Negative for congestion.    Respiratory: Negative for cough.    Cardiovascular: Negative for chest pain.   Gastrointestinal: Negative for abdominal pain, constipation, diarrhea and hematochezia.   Genitourinary: Negative for hematuria.   REVIEW OF SYSTEMS: The following systems have been completely reviewed and are negative except as noted above:   Constitutional, HEENT, respiratory, cardiovascular, gastrointestinal, genitourinary, musculoskeletal, dermatologic, hematologic, endocrine, psychiatric, and neurologic systems.      OBJECTIVE:   Vitals: /66   Pulse 108   Temp 97.5  F (36.4  C) (Oral)   Resp 18   Ht 1.88 m (6' 2\")   Wt 90.4 kg (199 lb 3.2 oz)   SpO2 100%   BMI 25.58 kg/m    BMI= Body mass index is 25.58 kg/m .    Physical Exam  GENERAL: healthy, alert and no distress  EYES: Eyes grossly normal to inspection, PERRL and conjunctivae and sclerae normal  HENT: ear canals and TM's normal, nose and mouth without ulcers or lesions  NECK: no adenopathy, no asymmetry, masses, or scars and thyroid normal to palpation  RESP: lungs clear to auscultation - no rales, rhonchi or wheezes  CV: regular rate and rhythm, normal S1 S2, no S3 or S4, no murmur, click or rub, no peripheral edema " and peripheral pulses strong  ABDOMEN: soft, nontender, no hepatosplenomegaly, no masses and bowel sounds normal  MS: no gross musculoskeletal defects noted, no edema  SKIN: no suspicious lesions or rashes  NEURO: Normal strength and tone, mentation intact and speech normal  PSYCH: mentation appears normal, affect normal/bright    Diagnostic Test Results:  Labs reviewed in Epic    ASSESSMENT/PLAN:   (Z00.00) Routine general medical examination at a health care facility  (primary encounter diagnosis)  Comment: Stable health. See epic orders.     (I25.10) Coronary artery disease involving native coronary artery of native heart without angina pectoris  Comment: No recent angina. Continue current meds.   Plan: metoprolol succinate ER (TOPROL-XL) 25 MG 24 hr        tablet, niacin ER (NIASPAN) 500 MG CR tablet,         OFFICE/OUTPT VISIT,EST,LEVL IV          (E11.22,  N18.30) Type 2 diabetes mellitus with stage 3 chronic kidney disease, without long-term current use of insulin, unspecified whether stage 3a or 3b CKD (H)  Comment: A1c at target. Continue current measures.   Plan: Hemoglobin A1c, Albumin Random Urine         Quantitative with Creat Ratio, EYE ADULT         REFERRAL, metFORMIN (GLUCOPHAGE) 500 MG tablet,        OFFICE/OUTPT VISIT,EST,LEVL IV          (E78.5) Hyperlipidemia LDL goal <100  Comment: LDL at target. Continue current meds.   Plan: Comprehensive metabolic panel, Lipid panel         reflex to direct LDL Non-fasting, OFFICE/OUTPT         VISIT,EST,LEVL IV          (R53.83) Fatigue, unspecified type  Comment: See epic orders.   Plan: TSH with free T4 reflex, CBC with platelets,         OFFICE/OUTPT VISIT,EST,LEVL IV          (F43.21) Adjustment disorder with depressed mood  Comment: Depressive symptoms. Discussed Prozac--he agrees to start this.   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   Plan: rivaroxaban ANTICOAGULANT (XARELTO) 20 MG TABS         tablet, OFFICE/OUTPT VISIT,EST,LEVL IV    (K08.89) Pain, dental  Comment: Offered small number of tabs of oxycodone.   Plan: naloxone (NARCAN) 4 MG/0.1ML nasal spray,         OFFICE/OUTPT VISIT,EST,LEVL IV          (Z12.5) Screening PSA (prostate specific antigen)  Plan: Prostate spec antigen screen            Patient has been advised of split billing requirements and indicates understanding: Yes  COUNSELING:   Reviewed preventive health counseling, as reflected in patient instructions    Estimated body mass index is 26.49 kg/m  as calculated from the following:    Height as of 1/24/20: 1.88 m (6' 2\").    Weight as of 1/24/20: 93.6 kg (206 lb 4.8 oz).         He reports that he has been smoking. He has been smoking about 0.50 packs per day. He has never used smokeless tobacco.  Tobacco Cessation Action Plan:   Information offered: Patient not interested at this time    PATIENT INSTRUCTIONS:  Let's add Prozac (fluoxetine) 20 mg each morning with food. This should help energy and mood.     Recommend going ahead with the heart monitor.     Would stop the Victoza to see if your weight and appetite improve.   Keep taking metformin, but would take one tablet twice a day.     Refilled needed meds.     Stop by lab before going home.     Set up a video visit with me in about 6 weeks to see how you are doing on the Prozac.     Counseling Resources:  ATP IV Guidelines  Pooled Cohorts Equation Calculator  FRAX Risk Assessment  ICSI Preventive Guidelines  Dietary Guidelines for Americans, 2010  [email or web address]'s MyPlate  ASA Prophylaxis  Lung CA Screening    Ugo Delgado MD,   [facility]  "

## 2020-11-11 NOTE — PATIENT INSTRUCTIONS
Preventive Health Recommendations  Male Ages 50 - 64    Yearly exam:             See your health care provider every year in order to  o   Review health changes.   o   Discuss preventive care.    o   Review your medicines if your doctor has prescribed any.     Have a cholesterol test every 5 years, or more frequently if you are at risk for high cholesterol/heart disease.     Have a diabetes test (fasting glucose) every three years. If you are at risk for diabetes, you should have this test more often.     Have a colonoscopy at age 50, or have a yearly FIT test (stool test). These exams will check for colon cancer.      Talk with your health care provider about whether or not a prostate cancer screening test (PSA) is right for you.    You should be tested each year for STDs (sexually transmitted diseases), if you re at risk.     Shots: Get a flu shot each year. Get a tetanus shot every 10 years.     Nutrition:    Eat at least 5 servings of fruits and vegetables daily.     Eat whole-grain bread, whole-wheat pasta and brown rice instead of white grains and rice.     Get adequate Calcium and Vitamin D.     Lifestyle    Exercise for at least 150 minutes a week (30 minutes a day, 5 days a week). This will help you control your weight and prevent disease.     Limit alcohol to one drink per day.     No smoking.     Wear sunscreen to prevent skin cancer.     See your dentist every six months for an exam and cleaning.     See your eye doctor every 1 to 2 years.        Let's add Prozac (fluoxetine) 20 mg each morning with food. This should help energy and mood.     Recommend going ahead with the heart monitor.     Would stop the Victoza to see if your weight and appetite improve.   Keep taking metformin, but would take one tablet twice a day.     Refilled needed meds.     Stop by lab before going home.     Set up a video visit with me in about 6 weeks to see how you are doing on the Prozac.

## 2020-11-12 LAB
ALBUMIN SERPL-MCNC: 3.7 G/DL (ref 3.4–5)
ALP SERPL-CCNC: 62 U/L (ref 40–150)
ALT SERPL W P-5'-P-CCNC: 30 U/L (ref 0–70)
ANION GAP SERPL CALCULATED.3IONS-SCNC: 8 MMOL/L (ref 3–14)
AST SERPL W P-5'-P-CCNC: 22 U/L (ref 0–45)
BILIRUB SERPL-MCNC: 0.3 MG/DL (ref 0.2–1.3)
BUN SERPL-MCNC: 19 MG/DL (ref 7–30)
CALCIUM SERPL-MCNC: 8.8 MG/DL (ref 8.5–10.1)
CHLORIDE SERPL-SCNC: 104 MMOL/L (ref 94–109)
CHOLEST SERPL-MCNC: 152 MG/DL
CO2 SERPL-SCNC: 26 MMOL/L (ref 20–32)
CREAT SERPL-MCNC: 1.37 MG/DL (ref 0.66–1.25)
CREAT UR-MCNC: 248 MG/DL
GFR SERPL CREATININE-BSD FRML MDRD: 54 ML/MIN/{1.73_M2}
GLUCOSE SERPL-MCNC: 79 MG/DL (ref 70–99)
HDLC SERPL-MCNC: 22 MG/DL
LDLC SERPL CALC-MCNC: 87 MG/DL
MICROALBUMIN UR-MCNC: 94 MG/L
MICROALBUMIN/CREAT UR: 38.02 MG/G CR (ref 0–17)
NONHDLC SERPL-MCNC: 130 MG/DL
POTASSIUM SERPL-SCNC: 4.1 MMOL/L (ref 3.4–5.3)
PROT SERPL-MCNC: 7.8 G/DL (ref 6.8–8.8)
PSA SERPL-ACNC: 4.69 UG/L (ref 0–4)
SODIUM SERPL-SCNC: 138 MMOL/L (ref 133–144)
TRIGL SERPL-MCNC: 217 MG/DL
TSH SERPL DL<=0.005 MIU/L-ACNC: 0.56 MU/L (ref 0.4–4)

## 2020-11-16 ENCOUNTER — TELEPHONE (OUTPATIENT)
Dept: INTERNAL MEDICINE | Facility: CLINIC | Age: 65
End: 2020-11-16

## 2020-11-16 DIAGNOSIS — N18.30 TYPE 2 DIABETES MELLITUS WITH STAGE 3 CHRONIC KIDNEY DISEASE, WITHOUT LONG-TERM CURRENT USE OF INSULIN, UNSPECIFIED WHETHER STAGE 3A OR 3B CKD (H): Primary | ICD-10-CM

## 2020-11-16 DIAGNOSIS — E11.22 TYPE 2 DIABETES MELLITUS WITH STAGE 3 CHRONIC KIDNEY DISEASE, WITHOUT LONG-TERM CURRENT USE OF INSULIN, UNSPECIFIED WHETHER STAGE 3A OR 3B CKD (H): Primary | ICD-10-CM

## 2020-11-16 RX ORDER — LANCETS
EACH MISCELLANEOUS
Qty: 100 EACH | Refills: 6 | Status: SHIPPED | OUTPATIENT
Start: 2020-11-16 | End: 2021-10-08

## 2020-11-16 RX ORDER — GLUCOSAMINE HCL/CHONDROITIN SU 500-400 MG
CAPSULE ORAL
Qty: 100 EACH | Refills: 3 | Status: SHIPPED | OUTPATIENT
Start: 2020-11-16

## 2020-11-16 NOTE — TELEPHONE ENCOUNTER
"Patient calling to request a Dexcom G6 monitor to monitor his blood sugar. He needs the Dexcom because if is blue tooth capable and he needs to stay \"hand-free\" due to his work as an . Please advise.  "

## 2020-11-18 NOTE — TELEPHONE ENCOUNTER
Patient calls back and states he does not want a Freestyle meter. He is specifically asking for the RX to state Dexcom 6 , transmitter, , and sensor. Patient is getting rather frustrated that this is not resolved correctly yet.

## 2020-11-18 NOTE — TELEPHONE ENCOUNTER
[facility] sent fax stating they need prescription for Dexcom if that is what is specifically needed for patient. Dexcom 6 orders pended for , sensor and transmitter and routed to Dr. Delgado to review.

## 2020-11-19 RX ORDER — PROCHLORPERAZINE 25 MG/1
1 SUPPOSITORY RECTAL
Qty: 3 EACH | Refills: 11 | Status: SHIPPED | OUTPATIENT
Start: 2020-11-19

## 2020-11-19 RX ORDER — PROCHLORPERAZINE 25 MG/1
1 SUPPOSITORY RECTAL
Qty: 1 EACH | Refills: 3 | Status: SHIPPED | OUTPATIENT
Start: 2020-11-19

## 2020-11-19 RX ORDER — PROCHLORPERAZINE 25 MG/1
1 SUPPOSITORY RECTAL ONCE
Qty: 1 DEVICE | Refills: 0 | Status: SHIPPED | OUTPATIENT
Start: 2020-11-19 | End: 2020-11-19

## 2020-11-24 DIAGNOSIS — E78.5 HYPERLIPIDEMIA LDL GOAL <100: ICD-10-CM

## 2020-11-25 RX ORDER — ATORVASTATIN CALCIUM 20 MG/1
20 TABLET, FILM COATED ORAL DAILY
Qty: 90 TABLET | Refills: 0 | Status: SHIPPED | OUTPATIENT
Start: 2020-11-25 | End: 2020-11-27

## 2020-11-25 NOTE — TELEPHONE ENCOUNTER
Prescription approved per [facility] Refill Protocol.  Donte Roque RN, BSN

## 2020-11-27 RX ORDER — ATORVASTATIN CALCIUM 20 MG/1
20 TABLET, FILM COATED ORAL DAILY
Qty: 90 TABLET | Refills: 0 | Status: SHIPPED | OUTPATIENT
Start: 2020-11-27 | End: 2021-03-30

## 2020-12-21 ENCOUNTER — TELEPHONE (OUTPATIENT)
Dept: INTERNAL MEDICINE | Facility: CLINIC | Age: 65
End: 2020-12-21

## 2020-12-21 NOTE — LETTER
[facility]  [address], [address]  [place], [place]  [address]                                            TEL:[phone]  FAX:[phone]      December 21, 2020    Osvaldo Parry  [address] [place] [address]          Dear Osvaldo Parry    We have been unable to reach you by phone.  Please call our office at [phone] between the hours of 8:00am and 5:00 pm for a message.      Sincerely,    JESSICA Caballero

## 2020-12-21 NOTE — TELEPHONE ENCOUNTER
"Victoza discontinued 11/11/2020 and per Dr. Delgado's 11/11/2020 office visit note:   \"Would stop the Victoza to see if your weight and appetite improve.   Keep taking metformin, but would take one tablet twice a day.\"     Attempted to call patient, message states that the call cannot be completed as dialed. Patient has Mychart, but has not read resent messages sent to him. Letter mailed to patient advising him to call the clinic.   "

## 2020-12-23 NOTE — TELEPHONE ENCOUNTER
Attempted to contact patient, call went through, but no answer and voice mail has not been set up.

## 2020-12-29 NOTE — TELEPHONE ENCOUNTER
Call to pharmacy. Advised patient is no longer taking this medication. Advised we attempted to reach out to patient but have been unsuccessful. Requested if patient contacts them regarding this to have him call the clinic.

## 2021-01-15 ENCOUNTER — HEALTH MAINTENANCE LETTER (OUTPATIENT)
Age: 66
End: 2021-01-15

## 2021-01-17 DIAGNOSIS — N18.30 STAGE 3 CHRONIC KIDNEY DISEASE, UNSPECIFIED WHETHER STAGE 3A OR 3B CKD (H): ICD-10-CM

## 2021-01-17 DIAGNOSIS — I25.10 CORONARY ARTERY DISEASE INVOLVING NATIVE CORONARY ARTERY OF NATIVE HEART WITHOUT ANGINA PECTORIS: ICD-10-CM

## 2021-01-18 RX ORDER — LISINOPRIL 2.5 MG/1
TABLET ORAL
Qty: 90 TABLET | Refills: 0 | Status: SHIPPED | OUTPATIENT
Start: 2021-01-18 | End: 2021-03-30

## 2021-01-18 NOTE — TELEPHONE ENCOUNTER
Pending Prescriptions:                       Disp   Refills    lisinopril (ZESTRIL) 2.5 MG tablet [Pharma*90 tab*0        Sig: TAKE 1 TABLET(2.5 MG) BY MOUTH DAILY    Routing refill request to provider for review/approval because:  Patient fails protocol

## 2021-03-17 ENCOUNTER — OFFICE VISIT (OUTPATIENT)
Dept: OPTOMETRY | Facility: CLINIC | Age: 66
End: 2021-03-17
Payer: COMMERCIAL

## 2021-03-17 DIAGNOSIS — E11.22 TYPE 2 DIABETES MELLITUS WITH STAGE 3 CHRONIC KIDNEY DISEASE, WITHOUT LONG-TERM CURRENT USE OF INSULIN, UNSPECIFIED WHETHER STAGE 3A OR 3B CKD (H): Primary | ICD-10-CM

## 2021-03-17 DIAGNOSIS — H02.889 MEIBOMIAN GLAND DYSFUNCTION: ICD-10-CM

## 2021-03-17 DIAGNOSIS — L98.9 SKIN DISORDER: ICD-10-CM

## 2021-03-17 DIAGNOSIS — H10.13 ALLERGIC CONJUNCTIVITIS OF BOTH EYES: ICD-10-CM

## 2021-03-17 DIAGNOSIS — H52.4 PRESBYOPIA: ICD-10-CM

## 2021-03-17 DIAGNOSIS — H52.03 HYPEROPIA, BILATERAL: ICD-10-CM

## 2021-03-17 DIAGNOSIS — N18.30 TYPE 2 DIABETES MELLITUS WITH STAGE 3 CHRONIC KIDNEY DISEASE, WITHOUT LONG-TERM CURRENT USE OF INSULIN, UNSPECIFIED WHETHER STAGE 3A OR 3B CKD (H): Primary | ICD-10-CM

## 2021-03-17 DIAGNOSIS — B88.0 INFESTATION BY DEMODEX: ICD-10-CM

## 2021-03-17 PROCEDURE — 92015 DETERMINE REFRACTIVE STATE: CPT | Performed by: OPTOMETRIST

## 2021-03-17 PROCEDURE — 92004 COMPRE OPH EXAM NEW PT 1/>: CPT | Performed by: OPTOMETRIST

## 2021-03-17 RX ORDER — OLOPATADINE HYDROCHLORIDE 2 MG/ML
1 SOLUTION/ DROPS OPHTHALMIC DAILY
Qty: 2.5 ML | Refills: 11 | Status: SHIPPED | OUTPATIENT
Start: 2021-03-17 | End: 2022-03-17

## 2021-03-17 RX ORDER — POLYETHYLENE GLYCOL 400 AND PROPYLENE GLYCOL 4; 3 MG/ML; MG/ML
1 SOLUTION/ DROPS OPHTHALMIC 4 TIMES DAILY
Qty: 6 ML | Refills: 12 | Status: SHIPPED | OUTPATIENT
Start: 2021-03-17 | End: 2022-03-17

## 2021-03-17 ASSESSMENT — REFRACTION_MANIFEST
OS_SPHERE: +1.00
OD_CYLINDER: +0.50
OD_SPHERE: +1.50
OD_AXIS: 030
OD_ADD: +2.25
OS_AXIS: 014
OS_CYLINDER: +0.25
OS_ADD: +2.25

## 2021-03-17 ASSESSMENT — REFRACTION_WEARINGRX
OD_SPHERE: +2.00
OS_SPHERE: +2.00
SPECS_TYPE: OTC READERS

## 2021-03-17 ASSESSMENT — VISUAL ACUITY
OS_SC: 20/40
CORRECTION_TYPE: GLASSES
OS_CC: 2040
METHOD: SNELLEN - LINEAR
METHOD_MR: LENSES FOGGING WITH MASK.
OD_SC: 20/40
OD_CC: 20/60

## 2021-03-17 ASSESSMENT — TONOMETRY
IOP_METHOD: TONOPEN
OD_IOP_MMHG: 7
OS_IOP_MMHG: 9

## 2021-03-17 ASSESSMENT — EXTERNAL EXAM - LEFT EYE: OS_EXAM: NORMAL

## 2021-03-17 ASSESSMENT — CUP TO DISC RATIO
OD_RATIO: 0.2
OS_RATIO: 0.2

## 2021-03-17 ASSESSMENT — CONF VISUAL FIELD
OD_NORMAL: 1
OS_NORMAL: 1

## 2021-03-17 NOTE — Clinical Note
Hi Ali!  Hey question for you - this patient has underneath his right eye some discoloration of his skin- with scaly almost chicken like texture.  Been there for a few years- he has diabetes.  He also has an infestation of demodex on his lids.  I am going to treat with some tea tree oil wipes or foam and have him use it on the skin also.  If this does not clear up is it something you would look at or dermatology?  Other thing I was thinking of is acanthosis nigricans?  You can kind of see if on his demographics pic.  Thanks!

## 2021-03-17 NOTE — PROGRESS NOTES
Chief Complaint   Patient presents with     Diabetic Eye Exam     Accompanied by self  Hemoglobin A1C   Date Value Ref Range Status   11/11/2020 5.9 (H) 0 - 5.6 % Final     Comment:     Normal <5.7% Prediabetes 5.7-6.4%  Diabetes 6.5% or higher - adopted from ADA   consensus guidelines.     09/16/2019 5.8 (H) 0 - 5.6 % Final     Comment:     Normal <5.7% Prediabetes 5.7-6.4%  Diabetes 6.5% or higher - adopted from ADA   consensus guidelines.     06/28/2018 6.1 (H) 0 - 5.6 % Final     Comment:     Normal <5.7% Prediabetes 5.7-6.4%  Diabetes 6.5% or higher - adopted from ADA   consensus guidelines.           Last Eye Exam: 4 years  Dilated Previously: Yes    What are you currently using to see? otc readers    Distance Vision Acuity: Satisfied with vision    Near Vision Acuity: Not satisfied     Eye Comfort: good,luz marina under od lower eyelid,skin dark around od eye,pt worried may be infection  Do you use eye drops? : No  Occupation or Hobbies: [employer]     Medical, surgical and family histories reviewed and updated 3/17/2021.       OBJECTIVE: See Ophthalmology exam    ASSESSMENT:    ICD-10-CM    1. Type 2 diabetes mellitus with stage 3 chronic kidney disease, without long-term current use of insulin, unspecified whether stage 3a or 3b CKD (H)  E11.22 EYE EXAM (SIMPLE-NONBILLABLE)    N18.30    2. Allergic conjunctivitis of both eyes  H10.13 EYE EXAM (SIMPLE-NONBILLABLE)     olopatadine (PATADAY) 0.2 % ophthalmic solution   3. Meibomian gland dysfunction  H02.889 EYE EXAM (SIMPLE-NONBILLABLE)     polyethylene glycol-propylene glycol (SYSTANE ULTRA) 0.4-0.3 % SOLN ophthalmic solution   4. Infestation by Demodex  B88.0 Eyelid Cleansers (OUST DEMODEX CLEANSER EXT ST) FOAM   5. Skin disorder  L98.9 EYE EXAM (SIMPLE-NONBILLABLE)   6. Hyperopia, bilateral  H52.03 REFRACTION   7. Presbyopia  H52.4 REFRACTION      PLAN:    Osvaldo Parry aware  eye exam results will be sent to  Ugo Delgado.  Patient Instructions   There are not any signs of the diabetes affecting the eyes today.  It is important that you get your eyes dilated once yearly and keep good control of your diabetes.    Pataday to be used once daily both eyes for 4 weeks then as needed for itchy eyes.    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 Cliradex eyelid scrubs or Ocusoft Oust foam cleanser to cleanse eyelids at night.  This can be purchased on [employer].  2 x day for 8 weeks.  These can be purchased online [employer] or at [employer] or Ocusoft Oust foaming eyelid cleanser.  Use wipes or foam to cleanse area below the eye.    Heat to the eyes daily for 10-15 minutes nightly with warm washcloth or reusable gel masks from the pharmacy or  "[employer] heat masks can be purchased at [employer].    Systane Ultra 1 drop both eyes 2-4 x day.    Referral to dermatology if no improvement in skin below eye after treatment with tea tree oil cleanser.    You have the start of mild cataracts.  You may notice some blurred vision or glare with night driving.  It is important that you wear good sunglasses to protect your eyes from the ultraviolet light from the sun.  I recommend that you return in 1 year for an eye exam unless there are any sudden changes in your vision.     Eyeglass prescription given.  Your options are a bifocal which would allow you to see distance and near vision or separate glasses for distance and reading.       I recommend you quit smoking.  You should see your PCP for smoking cessation options.    Return in 1 year for a complete eye exam or sooner if needed.    Emigdio Guerrero, OD

## 2021-03-17 NOTE — PATIENT INSTRUCTIONS
There are not any signs of the diabetes affecting the eyes today.  It is important that you get your eyes dilated once yearly and keep good control of your diabetes.    Pataday to be used once daily both eyes for 4 weeks then as needed for itchy eyes.    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 Cliradex eyelid scrubs or Ocusoft Oust foam cleanser to cleanse eyelids at night.  This can be purchased on Amazon.com.  2 x day for 8 weeks.  These can be purchased online [employer] or at [employer] or Ocusoft Oust foaming eyelid cleanser.  Use wipes or foam to cleanse area below the eye.    Heat to the eyes daily for 10-15 minutes nightly with warm washcloth or reusable gel masks from the pharmacy or  [employer] heat masks can be purchased at [employer]    Systane Ultra 1 drop both eyes 2-4 x day.    Referral to dermatology if no improvement in skin below eye after treatment with tea tree oil cleanser.    You have the start of mild cataracts.  You may notice some blurred vision or glare with night driving.  It is important that you wear good sunglasses to protect your eyes from the ultraviolet light from the sun.  I recommend that you return in 1 year for an eye exam unless there are any sudden changes in your vision.     Eyeglass prescription given.  Your options are a bifocal which would allow you to see distance and near vision or separate glasses for distance and reading.       I recommend you quit smoking.  You should see your PCP for smoking cessation options.    Return in 1 year for a complete eye exam or sooner if needed.    Emigdio Guerrero, OD    The affects of the dilating drops last for 4- 6 hours.  You will be more sensitive to light and vision will be blurry up close.  Do not drive if you do not feel comfortable.  Mydriatic sunglasses were given if needed.    Patient Education   Diabetes weakens the blood vessels all over the body, including the eyes. Damage to the blood vessels in the eyes can cause swelling or bleeding into part  of the eye (called the retina). This is called diabetic retinopathy (TWIN-tin-AH-puh-thee). If not treated, this disease can cause vision loss or blindness.   Symptoms may include blurred or distorted vision, but many people have no symptoms. It's important to see your eye doctor regularly to check for problems.   Early treatment and good control can help protect your vision. Here are the things you can do to help prevent vision loss:      1. Keep your blood sugar levels under tight control.      2. Bring high blood pressure under control.      3. No smoking.      4. Have yearly dilated eye exams.     There is a combination of three treatments which can greatly improve symptoms of dry eyes.    1.  Artificial tears  2. Heat (eyes closed)  3. Eyelid and eyelash cleansing (eyes closed)     Use one drop of artificial tears both eyes 4 x daily.  Once in the morning, lunch, dinner and bedtime. Continue to use the drops regardless if your eyes are comfortable or not.  Artificial tears work best as a preventative and not as well after your eyes are starting to bother you.  It may take 4- 6 weeks of using the drops before you notice improvement.  If after that time you are still having problems schedule an appointment for an evaluation and discussion of different treatments such as Restasis or Xiidra.  Dry eyes are a chronic condition and you may have more symptoms at certain times of the year.    Excess tearing can be due to the right tears not working properly or a blockage in the tear drainage system.  You can try using artificial tears 1 drop right eye 4 x day.  If the excess tearing is bothersome after 4-6 weeks of treatment then we can send you for further testing.  This would entail a referral to our oculoplastic specialist Dr. Loreto Alvarado at the [facility]-[phone].    Recommended brands are:    Systane Complete  Systane Ultra  Systane Balance  Refresh Advanced Optive  Refresh  Relieva  Blink    Recommended brands for contact lens wearers are:    Systane contacts  Refresh contacts  Blink contacts    If you are using drops more than 4 x day or have sensitivities to preservatives I recommend non preserved artificial tears.  These come in 1 use vials.  They can be used every 1-2 hours.  Do not reuse the vials.    Recommended brands are:    Refresh Optive Jordin-3  Systane- preservative free  Refresh-  preservative free  Blink- preservative free    Gels or ointment can be used at night.    Recommended brands are:    Systane Gel  Refresh Gel  Blink Gel  Genteal Gel    Systane night time (ointment)  Refresh Celluvisc  Refresh PM (ointment)      Visine, Clear Eyes or Murine (drops that get the red out) can irritate the eyes and cause a rebound effect where the eyes become more red and you end up using more drops.  Avoid drops containing tetrahydrozoline, naphazoline, phenylephrine, oxymetazoline.      OTC Lumify is a newer product that gives immediate redness relief without the rebound effect.  Use as needed to take the redness out.    Artificial tears may be used with other drops (such as allergy, glaucoma, antibiotics) around the same time.  Be sure to wait 5 minutes in between drops.    Heat to the eyelids can also improve your symptoms of dry eyes.  Jaden heat masks can be purchased at Amazon to be used nightly for 10-15 minutes.  Other options are gel masks that can be put in the microwave and purchased at most pharmacies.      Tea Tree Oil eyelid cleansers recommended are Ocusoft Oust foam cleanser to cleanse eyelids/lashes at night and in the am. Other options are Blephadex or Cliradex eyelid wipes.  KEEP EYES CLOSED when using these products.  These can be purchased on amazon.com   A good product for make up remover with tea tree oil is WeLoveEyes.  This can be found at www.[employer] or [employer].    Other good eyelid cleansers have hypochlorous acid which removes excess bacteria  and is safe around the eyes. Products are Avenova, Ocusoft Hypochlor or Heyedrate. Spray solution onto cotton pad, close eyes and gently apply to eyelids and eyelashes using side to side motion.  You can also KEEP EYES CLOSED spray and rub into eyelashes.  You do not need to rinse it off. Use morning and evening. These products can be found on Amazon.  You can check with your local pharmacy and see if they can order if for you if they don't have it.    Other brands of eyelid cleansing wipes are:    Ocusoft wipes  Systane wipes    A great eye make up line is [email or web address]      Optometry Providers       Clinic Locations                                 Telephone Number   Dr. Pita Du [phone]     [facility] Hours:                [facility] Hours:       [facility] Hours:   [address] [address]   [address] [address] [facility]     [address] [facility] [address] [facility]  [place] [address]   [place] [address]    [place] [address]  Phone: [phone]                    Phone: [phone]     Phone: [phone]                      Monday 8:00-7:00                          Monday 8:00-7:00                          Monday 8:00-7:00              Tuesday 8:00-6:00                          Tuesday 8:00-7:00                          Tuesday 8:00-7:00              Wednesday 8:00-6:00                  Wednesday 8:00-7:00                   Wednesday 8:00-7:00      Thursday 8:00-6:00                        Thursday 8:00-7:00                         Thursday 8:00-7:00            Friday 8:00-5:00                              Friday 8:00-5:00                              Friday 8:00-5:00    [facility] Hours:   [address] [facility] [place] [id number] [phone]    Monday 8:00-7:00  Tuesday 8:00-7:00  Wednesday 8:00-7:00  Thursday 8:00-7:00  Friday 8:00-5:00  Please log on to [email or web address] to order your contact  lenses.  The link is found on the [facility] page.  As always, Thank you for trusting us with your health care needs!

## 2021-03-17 NOTE — LETTER
3/17/2021         RE: Osvaldo Parry  [address] [address]        Dear Colleague,    Thank you for referring your patient, Osvaldo Parry, to the [facility]. Please see a copy of my visit note below.    Chief Complaint   Patient presents with     Diabetic Eye Exam     Accompanied by self  Hemoglobin A1C   Date Value Ref Range Status   11/11/2020 5.9 (H) 0 - 5.6 % Final     Comment:     Normal <5.7% Prediabetes 5.7-6.4%  Diabetes 6.5% or higher - adopted from ADA   consensus guidelines.     09/16/2019 5.8 (H) 0 - 5.6 % Final     Comment:     Normal <5.7% Prediabetes 5.7-6.4%  Diabetes 6.5% or higher - adopted from ADA   consensus guidelines.     06/28/2018 6.1 (H) 0 - 5.6 % Final     Comment:     Normal <5.7% Prediabetes 5.7-6.4%  Diabetes 6.5% or higher - adopted from ADA   consensus guidelines.           Last Eye Exam: 4 years  Dilated Previously: Yes    What are you currently using to see? otc readers    Distance Vision Acuity: Satisfied with vision    Near Vision Acuity: Not satisfied     Eye Comfort: good,luz marina under od lower eyelid,skin dark around od eye,pt worried may be infection  Do you use eye drops? : No  Occupation or Hobbies: sasha [employer]     Medical, surgical and family histories reviewed and updated 3/17/2021.       OBJECTIVE: See Ophthalmology exam    ASSESSMENT:    ICD-10-CM    1. Type 2 diabetes mellitus with stage 3 chronic kidney disease, without long-term current use of insulin, unspecified whether stage 3a or 3b CKD (H)  E11.22 EYE EXAM (SIMPLE-NONBILLABLE)    N18.30    2. Allergic conjunctivitis of both eyes  H10.13 EYE EXAM (SIMPLE-NONBILLABLE)     olopatadine (PATADAY) 0.2 % ophthalmic solution   3. Meibomian gland dysfunction  H02.889 EYE EXAM (SIMPLE-NONBILLABLE)     polyethylene glycol-propylene glycol (SYSTANE ULTRA) 0.4-0.3 % SOLN ophthalmic solution   4. Infestation by  Demodex  B88.0 Eyelid Cleansers (OUST DEMODEX CLEANSER EXT ST) FOAM   5. Skin disorder  L98.9 EYE EXAM (SIMPLE-NONBILLABLE)   6. Hyperopia, bilateral  H52.03 REFRACTION   7. Presbyopia  H52.4 REFRACTION      PLAN:    Osvaldo Parry aware  eye exam results will be sent to Ugo Delgdao  Patient Instructions   There are not any signs of the diabetes affecting the eyes today.  It is important that you get your eyes dilated once yearly and keep good control of your diabetes.    Pataday to be used once daily both eyes for 4 weeks then as needed for itchy eyes.    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 Cliradex eyelid scrubs or Ocusoft Oust foam cleanser to cleanse eyelids at night.  This can be purchased on Amazon.com.  2 x day for 8 weeks.  These can be purchased online [employer] or at [employer] or Ocusoft Oust foaming eyelid cleanser.  Use wipes or foam to cleanse area below the eye.    Heat to the eyes daily for 10-15 minutes nightly with warm washcloth or reusable gel masks from the pharmacy or  Wylio heat masks can be purchased at "[employer].    Systane Ultra 1 drop both eyes 2-4 x day.    Referral to dermatology if no improvement in skin below eye after treatment with tea tree oil cleanser.    You have the start of mild cataracts.  You may notice some blurred vision or glare with night driving.  It is important that you wear good sunglasses to protect your eyes from the ultraviolet light from the sun.  I recommend that you return in 1 year for an eye exam unless there are any sudden changes in your vision.     Eyeglass prescription given.  Your options are a bifocal which would allow you to see distance and near vision or separate glasses for distance and reading.       I recommend you quit smoking.  You should see your PCP for smoking cessation options.    Return in 1 year for a complete eye exam or sooner if needed.    Emigdio Guerrero, OD                                     Again, thank you for allowing me to  participate in the care of your patient.        Sincerely,        Emigdio Guerrero OD

## 2021-05-06 ENCOUNTER — TELEPHONE (OUTPATIENT)
Dept: INTERNAL MEDICINE | Facility: CLINIC | Age: 66
End: 2021-05-06

## 2021-05-06 NOTE — TELEPHONE ENCOUNTER
Pt calling to inquire about J&J vaccine side effects.  Pt advised ?Safety and side effects - In the phase III efficacy trial, injection site pain, headache, fatigue, and myalgia were reported in 49, 39, 38, and 33 percent, respectively, of vaccine recipients; fewer than 2 percent reported a grade 3 systemic side effect [100]. Fever occurred following vaccination in 9 percent. These side effects were more common among those 59 years and younger compared with older participants    Pt had no further questions at this time.    Mayank PENALOZA RN   [facility]

## 2021-06-07 NOTE — TELEPHONE ENCOUNTER
Called and spoke with patient to notify him that he is overdue for an INR appointment.  Assisted him with scheduling on 12/21/17.  Kaitlin Lassiter RN     Render In Strict Bullet Format?: No Detail Level: Zone Continue Regimen: Doxycycline 50mg

## 2021-07-04 ENCOUNTER — HEALTH MAINTENANCE LETTER (OUTPATIENT)
Age: 66
End: 2021-07-04

## 2021-08-09 ENCOUNTER — TRANSFERRED RECORDS (OUTPATIENT)
Dept: HEALTH INFORMATION MANAGEMENT | Facility: CLINIC | Age: 66
End: 2021-08-09

## 2021-08-09 DIAGNOSIS — N18.30 STAGE 3 CHRONIC KIDNEY DISEASE, UNSPECIFIED WHETHER STAGE 3A OR 3B CKD (H): ICD-10-CM

## 2021-08-09 DIAGNOSIS — I25.10 CORONARY ARTERY DISEASE INVOLVING NATIVE CORONARY ARTERY OF NATIVE HEART WITHOUT ANGINA PECTORIS: ICD-10-CM

## 2021-08-10 NOTE — TELEPHONE ENCOUNTER
Routing refill request to provider for review/approval because:  Creatinine   Date Value Ref Range Status   11/11/2020 1.37 (H) 0.66 - 1.25 mg/dL Final

## 2021-08-13 LAB
CREATININE (EXTERNAL): 1.15 MG/DL (ref 0.7–1.3)
GFR ESTIMATED (EXTERNAL): >60 ML/MIN
GFR ESTIMATED (IF AFRICAN AMERICAN) (EXTERNAL): >60 ML/MIN
GLUCOSE (EXTERNAL): 172 MG/DL (ref 74–106)
POTASSIUM (EXTERNAL): 4.5 MMOL/L (ref 3.5–5.1)

## 2021-08-17 ENCOUNTER — TELEPHONE (OUTPATIENT)
Dept: INTERNAL MEDICINE | Facility: CLINIC | Age: 66
End: 2021-08-17

## 2021-08-17 RX ORDER — METOPROLOL SUCCINATE 25 MG/1
TABLET, EXTENDED RELEASE ORAL
Qty: 90 TABLET | Refills: 0 | Status: SHIPPED | OUTPATIENT
Start: 2021-08-17

## 2021-08-17 RX ORDER — LISINOPRIL 2.5 MG/1
TABLET ORAL
Qty: 90 TABLET | Refills: 0 | Status: SHIPPED | OUTPATIENT
Start: 2021-08-17 | End: 2021-08-25

## 2021-08-17 NOTE — TELEPHONE ENCOUNTER
Can not offer refill of Percocet--he was discharged on #10 tabs likely to avoid developing opioid tolerance, and because the discharging provider did not feel that a larger quantity would be required.    He may require different pain management but this would require re-assessment in ED or with discharging provider from [facility]. He did sustain a subarachnoid hemorrhage and left occipital/temporal bone skull fractures.     He was seen by Neurosurgery, Dr Mccain and Rell Ryder PA-C with [facility]. He could also discuss with the on-call provider with this group.

## 2021-08-17 NOTE — TELEPHONE ENCOUNTER
Called patient and he has an upcoming appointment for hospital follow up and suture removal from head.      Medication is being filled for 1 time refill only due to:  has upcoming appt.       Next 5 appointments (look out 90 days)    Aug 25, 2021  2:00 PM  SHORT with Ugo Delgado MD  [facility] ([facility] ) [address]  [facility] [id number] [phone]

## 2021-08-17 NOTE — TELEPHONE ENCOUNTER
Patient reports he took a fall from a roof and was hospitalized at [facility] for 1 week with discharge 2 days ago. States he was on numerous pain meds while in hospital and has 9 staples in the back of his head.    Patient was discharged with Percocet 5-325 mg #10 tabs.  He has 3 tabs left and states even if he takes 2 at a time it is ineffective in controlling his head pain.  He is asking if primary care provider would prescribe something more for the pain that will help him sleep as he reports not being able to sleep.   He rates the pain 8.5-9 on a 1-10 scale.  Suggested that he may need to return to ER if his pain is rated that high.  He refuses stating it would cost a large amount to go back there.      He has an 8/25/21 appointment to see primary care provider and have federico removed.  EMILY Eaton R.N.

## 2021-08-25 ENCOUNTER — OFFICE VISIT (OUTPATIENT)
Dept: INTERNAL MEDICINE | Facility: CLINIC | Age: 66
End: 2021-08-25
Payer: COMMERCIAL

## 2021-08-25 VITALS
SYSTOLIC BLOOD PRESSURE: 98 MMHG | DIASTOLIC BLOOD PRESSURE: 62 MMHG | TEMPERATURE: 98.1 F | RESPIRATION RATE: 16 BRPM | HEIGHT: 74 IN | BODY MASS INDEX: 25.28 KG/M2 | HEART RATE: 83 BPM | OXYGEN SATURATION: 98 % | WEIGHT: 197 LBS

## 2021-08-25 DIAGNOSIS — E78.5 HYPERLIPIDEMIA LDL GOAL <100: ICD-10-CM

## 2021-08-25 DIAGNOSIS — I71.20 THORACIC AORTIC ANEURYSM WITHOUT RUPTURE (H): ICD-10-CM

## 2021-08-25 DIAGNOSIS — D68.51 FACTOR 5 LEIDEN MUTATION, HETEROZYGOUS (H): ICD-10-CM

## 2021-08-25 DIAGNOSIS — I60.9 SUBARACHNOID HEMORRHAGE (H): Primary | ICD-10-CM

## 2021-08-25 DIAGNOSIS — E11.22 TYPE 2 DIABETES MELLITUS WITH STAGE 3 CHRONIC KIDNEY DISEASE, WITHOUT LONG-TERM CURRENT USE OF INSULIN, UNSPECIFIED WHETHER STAGE 3A OR 3B CKD (H): ICD-10-CM

## 2021-08-25 DIAGNOSIS — S02.91XS CLOSED FRACTURE OF SKULL, UNSPECIFIED BONE, SEQUELA (H): ICD-10-CM

## 2021-08-25 DIAGNOSIS — I25.10 CORONARY ARTERY DISEASE INVOLVING NATIVE CORONARY ARTERY OF NATIVE HEART WITHOUT ANGINA PECTORIS: ICD-10-CM

## 2021-08-25 DIAGNOSIS — N18.30 TYPE 2 DIABETES MELLITUS WITH STAGE 3 CHRONIC KIDNEY DISEASE, WITHOUT LONG-TERM CURRENT USE OF INSULIN, UNSPECIFIED WHETHER STAGE 3A OR 3B CKD (H): ICD-10-CM

## 2021-08-25 LAB — HBA1C MFR BLD: 6.5 % (ref 0–5.6)

## 2021-08-25 PROCEDURE — 99214 OFFICE O/P EST MOD 30 MIN: CPT | Performed by: INTERNAL MEDICINE

## 2021-08-25 PROCEDURE — 83036 HEMOGLOBIN GLYCOSYLATED A1C: CPT | Performed by: INTERNAL MEDICINE

## 2021-08-25 PROCEDURE — 36415 COLL VENOUS BLD VENIPUNCTURE: CPT | Performed by: INTERNAL MEDICINE

## 2021-08-25 PROCEDURE — 80053 COMPREHEN METABOLIC PANEL: CPT | Performed by: INTERNAL MEDICINE

## 2021-08-25 PROCEDURE — 80061 LIPID PANEL: CPT | Performed by: INTERNAL MEDICINE

## 2021-08-25 RX ORDER — LANOLIN ALCOHOL/MO/W.PET/CERES
3-6 CREAM (GRAM) TOPICAL
COMMUNITY
Start: 2021-08-18

## 2021-08-25 RX ORDER — METHOCARBAMOL 500 MG/1
500-1000 TABLET, FILM COATED ORAL
COMMUNITY
Start: 2021-08-18

## 2021-08-25 ASSESSMENT — PATIENT HEALTH QUESTIONNAIRE - PHQ9: SUM OF ALL RESPONSES TO PHQ QUESTIONS 1-9: 3

## 2021-08-25 ASSESSMENT — MIFFLIN-ST. JEOR: SCORE: 1753.34

## 2021-08-25 NOTE — PROGRESS NOTES
"  Assessment & Plan   (I60.9) Subarachnoid hemorrhage (H)  (primary encounter diagnosis)  (S02.91XS) Closed fracture of skull, unspecified bone, sequela (H)  Comment: Headaches somewhat improved. Use oxycodone as prescribed by Neurosurgery. F/u with Neurosurgery about three weeks after discharge as they've advised, with repeat head CT at that time.   Plan: ASPIRIN NOT PRESCRIBED (INTENTIONAL)          (I25.10) Coronary artery disease involving native coronary artery of native heart without angina pectoris  Comment: No recent symptoms of angina or CHF.    (E11.22,  N18.30) Type 2 diabetes mellitus with stage 3 chronic kidney disease, without long-term current use of insulin, unspecified whether stage 3a or 3b CKD (H)  Comment: Appears well controlled. Update A1c.   Plan: Hemoglobin A1c, Adult Eye Referral          (E78.5) Hyperlipidemia LDL goal <100  Comment: Recent LDL at target. Continue current meds.   Plan: Lipid panel reflex to direct LDL Non-fasting,         Comprehensive metabolic panel          (D68.51) Factor 5 Leiden mutation, heterozygous (H)  Comment: Holding chronic oral anticoagulation until cleared by Neurosurgery.     (I71.2) Thoracic aortic aneurysm without rupture (H)  Comment: F/u with cardiology as they advise.              BMI:   Estimated body mass index is 25.29 kg/m  as calculated from the following:    Height as of this encounter: 1.88 m (6' 2\").    Weight as of this encounter: 89.4 kg (197 lb).       Patient Instructions   Update needed labs.     See specialists as planned.     Six staples removed scalp. May wash gently, may cover with antibiotic ointment.     See me in 3-6 months.       Return in about 6 months (around 2/25/2022) for Diabetes Recheck.    Ugo Delgado MD, MD  [facility] is a 66 year old who presents for the following health issues  accompanied by his sister: Hospital follow up and staple removal. There have been medication " changes while in the hospital.    [facility]       Hospital Follow-up Visit:    Hospital/Nursing Home/IP Rehab Facility: [facility]  Date of Admission: 8/9/2021  Date of Discharge: 8/14/2021  Reason(s) for Admission: subarachnoid hemorrhage due to fall.      Was your hospitalization related to COVID-19? No   Problems taking medications regularly:  None  Medication changes since discharge: None  Problems adhering to non-medication therapy:  None    Summary of hospitalization:  CareEverywhere information obtained and reviewed  Diagnostic Tests/Treatments reviewed.  Follow up needed: [facility], repeat head CT and office f/u three weeks after discharge.   Other Healthcare Providers Involved in Patient s Care:         Surgical follow-up appointment - Neurosurgery  Update since discharge: stable.     Post Discharge Medication Reconciliation: discharge medications reconciled and changed, per note/orders.  Plan of care communicated with patient and sister Amna Chatman.          We reviewed the patient's recent fall with head injury, subarachnoid hemorrhage and temporal and occipital bone fractures.  Reviewed his hospital course and need for post hospital follow-up.  He will be seeing neurosurgery 3 weeks after discharge with a repeat head CT at that time.  His Eliquis chronic oral anticoagulation has been held until that follow-up.    The patient's headache has improved to some degree.  He was offered a refill of oxycodone on by neurosurgery since an outpatient.  He is trying to taper this down.    His glycemic control, blood pressure and LDL cholesterol have been under excellent recent control.  He is not describing any recent angina or heart failure symptoms.    He does need removal of staples from his scalp laceration today.     Past medical, family and social histories as well as medications reviewed and updated as needed.    Review of Systems   REVIEW OF SYSTEMS: The following systems have been  "completely reviewed and are negative except as noted above:   Constitutional, HEENT, respiratory, cardiovascular, gastrointestinal, genitourinary, musculoskeletal, dermatologic, hematologic, endocrine, psychiatric, and neurologic systems.        Objective    BP 98/62   Pulse 83   Temp 98.1  F (36.7  C) (Oral)   Resp 16   Ht 1.88 m (6' 2\")   Wt 89.4 kg (197 lb)   SpO2 98%   BMI 25.29 kg/m    Body mass index is 25.29 kg/m .     Physical Exam   GENERAL: healthy, alert and no distress  EYES: Eyes grossly normal to inspection, PERRL and conjunctivae and sclerae normal  NECK: no adenopathy, no asymmetry, masses, or scars and thyroid normal to palpation  RESP: lungs clear to auscultation - no rales, rhonchi or wheezes  CV: regular rate and rhythm, normal S1 S2, no S3 or S4, no murmur, click or rub, no peripheral edema and peripheral pulses strong  ABDOMEN: soft, nontender, no hepatosplenomegaly, no masses and bowel sounds normal  MS: no gross musculoskeletal defects noted, no edema  SKIN: Laceration well-healed, staples removed.   NEURO: Normal strength and tone, mentation intact and speech normal           "

## 2021-08-25 NOTE — PATIENT INSTRUCTIONS
Update needed labs.     See specialists as planned.     Six staples removed scalp. May wash gently, may cover with antibiotic ointment.     See me in 3-6 months.

## 2021-08-26 LAB
ALBUMIN SERPL-MCNC: 3.8 G/DL (ref 3.4–5)
ALP SERPL-CCNC: 81 U/L (ref 40–150)
ALT SERPL W P-5'-P-CCNC: 46 U/L (ref 0–70)
ANION GAP SERPL CALCULATED.3IONS-SCNC: 1 MMOL/L (ref 3–14)
AST SERPL W P-5'-P-CCNC: 26 U/L (ref 0–45)
BILIRUB SERPL-MCNC: 0.5 MG/DL (ref 0.2–1.3)
BUN SERPL-MCNC: 13 MG/DL (ref 7–30)
CALCIUM SERPL-MCNC: 9 MG/DL (ref 8.5–10.1)
CHLORIDE BLD-SCNC: 106 MMOL/L (ref 94–109)
CHOLEST SERPL-MCNC: 177 MG/DL
CO2 SERPL-SCNC: 29 MMOL/L (ref 20–32)
CREAT SERPL-MCNC: 1.45 MG/DL (ref 0.66–1.25)
FASTING STATUS PATIENT QL REPORTED: NO
GFR SERPL CREATININE-BSD FRML MDRD: 51 ML/MIN/1.73M2
GLUCOSE BLD-MCNC: 88 MG/DL (ref 70–99)
HDLC SERPL-MCNC: 24 MG/DL
LDLC SERPL CALC-MCNC: 79 MG/DL
NONHDLC SERPL-MCNC: 153 MG/DL
POTASSIUM BLD-SCNC: 4.2 MMOL/L (ref 3.4–5.3)
PROT SERPL-MCNC: 7.8 G/DL (ref 6.8–8.8)
SODIUM SERPL-SCNC: 136 MMOL/L (ref 133–144)
TRIGL SERPL-MCNC: 368 MG/DL

## 2021-08-30 ENCOUNTER — TELEPHONE (OUTPATIENT)
Dept: INTERNAL MEDICINE | Facility: CLINIC | Age: 66
End: 2021-08-30

## 2021-09-02 ENCOUNTER — TRANSFERRED RECORDS (OUTPATIENT)
Dept: HEALTH INFORMATION MANAGEMENT | Facility: CLINIC | Age: 66
End: 2021-09-02

## 2021-09-17 ENCOUNTER — TELEPHONE (OUTPATIENT)
Dept: INTERNAL MEDICINE | Facility: CLINIC | Age: 66
End: 2021-09-17

## 2021-09-17 NOTE — LETTER
[place] [address], [address]  [place], [place]  [address]                                            TEL:[phone]  FAX:[phone]      Osvaldo Parry  [address] [id number]      September 17, 2021    Dear Mr Parry,     The results of your lab tests are now available for you to review on Marvin, a copy is enclosed with this letter. Everything looks fine.      Hemoglobin A1C measures control of diabetes. Your Hemoglobin A1C is excellent at 6.5.   The ideal target is under 7.0.     LDL= Bad Cholesterol-- the target is below 100.      HDL= Good Cholesterol-- although this is determined mostly by heredity, exercise and/or medications may sometimes raise this number.     Triglycerides are another type of fat in the blood, and can sometimes be lowered by reducing intake of sweets or excess carbohydrates, alcohol, and by weight reduction if needed.  Sometimes medications are also used.     AST and ALT are liver tests, as are the bilirubin (total and direct), albumin, total protein, and alkaline phosphatase. Yours are all normal.      Urea Nitrogen and Creatinine are kidney tests--your kidney function is just mildly reduced but overall stable from before. GFR stands for Glomerular Filtration Rate, a more precise estimate of kidney function.     Sodium, Potassium, Chloride, Carbon Dioxide, and Calcium are all normal salts in the bloodstream. Yours all look normal. Your glucose (blood sugar) also looks fine. (You can ignore the anion gap result).      Please contact the clinic if you have any questions.       Sincerely,     Ugo Delgado MD

## 2021-09-17 NOTE — TELEPHONE ENCOUNTER
Dr. Delgado sent patient [employer] [employer] message on 8/31/21 regarding 8/25/21 lab results. Patient was also informed of results in 8/30/21 telephone encounter.     [employer] message from Dr. Delgado copied to letter and mailed to patient with lab results.     Ada Kyle RN  [facility]

## 2021-10-24 ENCOUNTER — HEALTH MAINTENANCE LETTER (OUTPATIENT)
Age: 66
End: 2021-10-24

## 2021-10-25 ENCOUNTER — TELEPHONE (OUTPATIENT)
Dept: INTERNAL MEDICINE | Facility: CLINIC | Age: 66
End: 2021-10-25
Payer: COMMERCIAL

## 2021-10-25 NOTE — TELEPHONE ENCOUNTER
Patient's sister is calling to say they found Suleman dead at home on 10/5/21. They figure he may have been dead about five days. She is requesting to speak with Dr Delgado if possible about this

## 2021-12-19 ENCOUNTER — HEALTH MAINTENANCE LETTER (OUTPATIENT)
Age: 66
End: 2021-12-19

## 2022-04-10 ENCOUNTER — HEALTH MAINTENANCE LETTER (OUTPATIENT)
Age: 67
End: 2022-04-10

## 2022-10-15 ENCOUNTER — HEALTH MAINTENANCE LETTER (OUTPATIENT)
Age: 67
End: 2022-10-15

## 2023-06-01 ENCOUNTER — HEALTH MAINTENANCE LETTER (OUTPATIENT)
Age: 68
End: 2023-06-01

## 2023-10-29 ENCOUNTER — HEALTH MAINTENANCE LETTER (OUTPATIENT)
Age: 68
End: 2023-10-29

## 2024-01-07 ENCOUNTER — HEALTH MAINTENANCE LETTER (OUTPATIENT)
Age: 69
End: 2024-01-07

## 2024-02-14 NOTE — TELEPHONE ENCOUNTER
Caller is requesting PCP be notfied that he has been unable to have his labs reginald pre appointment andbut that he would still like to see PCP at scheduled appointment today 09/12/19   Caller encouraged to get labs done in early a.m. But he states this is  Impossible   Advised that message will be sent   Rica Santiago RN  [facility]         no edema, no murmurs, regular rate and rhythm

## 2024-08-04 ENCOUNTER — HEALTH MAINTENANCE LETTER (OUTPATIENT)
Age: 69
End: 2024-08-04